# Patient Record
Sex: FEMALE | Race: WHITE | Employment: OTHER | URBAN - METROPOLITAN AREA
[De-identification: names, ages, dates, MRNs, and addresses within clinical notes are randomized per-mention and may not be internally consistent; named-entity substitution may affect disease eponyms.]

---

## 2016-01-01 ASSESSMENT — ENCOUNTER SYMPTOMS
SPEECH CHANGE: 0
SINUS PAIN: 0
HYPERTENSION: 0
MYALGIAS: 1
COUGH DISTURBING SLEEP: 1
CLAUDICATION: 1
NECK PAIN: 0
INSOMNIA: 1
DIZZINESS: 1
DYSURIA: 0
MEMORY LOSS: 0
HYPOTENSION: 0
NIGHT SWEATS: 0
POSTURAL DYSPNEA: 1
EXERCISE INTOLERANCE: 1
TROUBLE SWALLOWING: 0
ALTERED TEMPERATURE REGULATION: 0
CHILLS: 0
POLYPHAGIA: 0
SEIZURES: 0
HEMATURIA: 0
WEAKNESS: 1
DIFFICULTY URINATING: 0
HALLUCINATIONS: 0
LEG PAIN: 1
TASTE DISTURBANCE: 0
PARALYSIS: 0
TACHYCARDIA: 1
HEMOPTYSIS: 0
WEIGHT GAIN: 0
PANIC: 1
SMELL DISTURBANCE: 0
ARTHRALGIAS: 1
COUGH: 1
LOSS OF CONSCIOUSNESS: 0
LEG SWELLING: 1
SLEEP DISTURBANCES DUE TO BREATHING: 1
TINGLING: 1
DECREASED CONCENTRATION: 1
DISTURBANCES IN COORDINATION: 0
FATIGUE: 1
NECK MASS: 0
LIGHT-HEADEDNESS: 1
SNORES LOUDLY: 0
RESPIRATORY PAIN: 0
ORTHOPNEA: 1
HOARSE VOICE: 0
POLYDIPSIA: 0
STIFFNESS: 1
SINUS CONGESTION: 1
NERVOUS/ANXIOUS: 1
BACK PAIN: 0
FLANK PAIN: 0
HEADACHES: 0
DEPRESSION: 1
DYSPNEA ON EXERTION: 1
MUSCLE CRAMPS: 0
SORE THROAT: 0
WHEEZING: 1
WEIGHT LOSS: 0
DECREASED APPETITE: 0
JOINT SWELLING: 1
PALPITATIONS: 1
SPUTUM PRODUCTION: 0
FEVER: 0
INCREASED ENERGY: 1
SYNCOPE: 0
NUMBNESS: 1
TREMORS: 0
SHORTNESS OF BREATH: 1
MUSCLE WEAKNESS: 0

## 2017-01-01 ENCOUNTER — ANTICOAGULATION THERAPY VISIT (OUTPATIENT)
Dept: ANTICOAGULATION | Facility: CLINIC | Age: 67
End: 2017-01-01

## 2017-01-01 ENCOUNTER — ONCOLOGY VISIT (OUTPATIENT)
Dept: ONCOLOGY | Facility: CLINIC | Age: 67
End: 2017-01-01
Attending: NURSE PRACTITIONER
Payer: MEDICARE

## 2017-01-01 ENCOUNTER — TELEPHONE (OUTPATIENT)
Dept: PALLIATIVE CARE | Facility: CLINIC | Age: 67
End: 2017-01-01

## 2017-01-01 ENCOUNTER — TELEPHONE (OUTPATIENT)
Dept: FAMILY MEDICINE | Facility: CLINIC | Age: 67
End: 2017-01-01

## 2017-01-01 ENCOUNTER — OFFICE VISIT (OUTPATIENT)
Dept: PALLIATIVE CARE | Facility: CLINIC | Age: 67
End: 2017-01-01
Attending: SOCIAL WORKER
Payer: MEDICARE

## 2017-01-01 ENCOUNTER — INFUSION THERAPY VISIT (OUTPATIENT)
Dept: ONCOLOGY | Facility: CLINIC | Age: 67
End: 2017-01-01
Attending: OBSTETRICS & GYNECOLOGY
Payer: MEDICARE

## 2017-01-01 ENCOUNTER — OFFICE VISIT (OUTPATIENT)
Dept: PALLIATIVE CARE | Facility: CLINIC | Age: 67
End: 2017-01-01
Attending: INTERNAL MEDICINE
Payer: MEDICARE

## 2017-01-01 ENCOUNTER — CARE COORDINATION (OUTPATIENT)
Dept: ONCOLOGY | Facility: CLINIC | Age: 67
End: 2017-01-01

## 2017-01-01 ENCOUNTER — TELEPHONE (OUTPATIENT)
Dept: NURSING | Facility: CLINIC | Age: 67
End: 2017-01-01

## 2017-01-01 ENCOUNTER — TELEPHONE (OUTPATIENT)
Dept: OTHER | Facility: CLINIC | Age: 67
End: 2017-01-01

## 2017-01-01 ENCOUNTER — TRANSFERRED RECORDS (OUTPATIENT)
Dept: HEALTH INFORMATION MANAGEMENT | Facility: CLINIC | Age: 67
End: 2017-01-01

## 2017-01-01 ENCOUNTER — TELEPHONE (OUTPATIENT)
Dept: CARE COORDINATION | Facility: CLINIC | Age: 67
End: 2017-01-01

## 2017-01-01 ENCOUNTER — TELEPHONE (OUTPATIENT)
Dept: INTERNAL MEDICINE | Facility: CLINIC | Age: 67
End: 2017-01-01

## 2017-01-01 ENCOUNTER — TELEPHONE (OUTPATIENT)
Dept: ONCOLOGY | Facility: CLINIC | Age: 67
End: 2017-01-01

## 2017-01-01 ENCOUNTER — DOCUMENTATION ONLY (OUTPATIENT)
Dept: OTHER | Facility: CLINIC | Age: 67
End: 2017-01-01

## 2017-01-01 ENCOUNTER — OFFICE VISIT (OUTPATIENT)
Dept: URGENT CARE | Facility: URGENT CARE | Age: 67
End: 2017-01-01
Payer: MEDICARE

## 2017-01-01 ENCOUNTER — APPOINTMENT (OUTPATIENT)
Dept: LAB | Facility: CLINIC | Age: 67
End: 2017-01-01
Attending: OBSTETRICS & GYNECOLOGY
Payer: MEDICARE

## 2017-01-01 ENCOUNTER — APPOINTMENT (OUTPATIENT)
Dept: LAB | Facility: CLINIC | Age: 67
End: 2017-01-01
Attending: NURSE PRACTITIONER
Payer: MEDICARE

## 2017-01-01 ENCOUNTER — OFFICE VISIT (OUTPATIENT)
Dept: PALLIATIVE CARE | Facility: CLINIC | Age: 67
End: 2017-01-01
Payer: MEDICARE

## 2017-01-01 ENCOUNTER — TELEPHONE (OUTPATIENT)
Dept: URGENT CARE | Facility: URGENT CARE | Age: 67
End: 2017-01-01

## 2017-01-01 VITALS
DIASTOLIC BLOOD PRESSURE: 62 MMHG | BODY MASS INDEX: 30.6 KG/M2 | WEIGHT: 167.3 LBS | HEART RATE: 119 BPM | OXYGEN SATURATION: 93 % | TEMPERATURE: 100.3 F | RESPIRATION RATE: 18 BRPM | SYSTOLIC BLOOD PRESSURE: 115 MMHG

## 2017-01-01 VITALS
RESPIRATION RATE: 20 BRPM | DIASTOLIC BLOOD PRESSURE: 89 MMHG | TEMPERATURE: 98.3 F | BODY MASS INDEX: 31.89 KG/M2 | SYSTOLIC BLOOD PRESSURE: 143 MMHG | WEIGHT: 174.38 LBS | OXYGEN SATURATION: 94 % | HEART RATE: 108 BPM

## 2017-01-01 VITALS
WEIGHT: 174.7 LBS | HEART RATE: 116 BPM | TEMPERATURE: 98.3 F | RESPIRATION RATE: 20 BRPM | SYSTOLIC BLOOD PRESSURE: 124 MMHG | DIASTOLIC BLOOD PRESSURE: 80 MMHG | OXYGEN SATURATION: 95 % | BODY MASS INDEX: 31.94 KG/M2

## 2017-01-01 VITALS
HEART RATE: 107 BPM | WEIGHT: 165.25 LBS | OXYGEN SATURATION: 92 % | TEMPERATURE: 99.8 F | BODY MASS INDEX: 30.22 KG/M2 | DIASTOLIC BLOOD PRESSURE: 78 MMHG | SYSTOLIC BLOOD PRESSURE: 137 MMHG

## 2017-01-01 VITALS
BODY MASS INDEX: 32.24 KG/M2 | HEART RATE: 93 BPM | TEMPERATURE: 99.2 F | DIASTOLIC BLOOD PRESSURE: 69 MMHG | OXYGEN SATURATION: 96 % | SYSTOLIC BLOOD PRESSURE: 122 MMHG | WEIGHT: 176.3 LBS | RESPIRATION RATE: 20 BRPM

## 2017-01-01 VITALS
WEIGHT: 174 LBS | RESPIRATION RATE: 18 BRPM | DIASTOLIC BLOOD PRESSURE: 69 MMHG | OXYGEN SATURATION: 95 % | TEMPERATURE: 98.2 F | HEART RATE: 106 BPM | BODY MASS INDEX: 31.82 KG/M2 | SYSTOLIC BLOOD PRESSURE: 130 MMHG

## 2017-01-01 VITALS
RESPIRATION RATE: 16 BRPM | SYSTOLIC BLOOD PRESSURE: 130 MMHG | TEMPERATURE: 98.9 F | OXYGEN SATURATION: 91 % | HEART RATE: 98 BPM | BODY MASS INDEX: 31.15 KG/M2 | DIASTOLIC BLOOD PRESSURE: 73 MMHG | TEMPERATURE: 98.5 F | OXYGEN SATURATION: 98 % | SYSTOLIC BLOOD PRESSURE: 128 MMHG | HEART RATE: 98 BPM | WEIGHT: 169.2 LBS | RESPIRATION RATE: 14 BRPM | BODY MASS INDEX: 30.95 KG/M2 | DIASTOLIC BLOOD PRESSURE: 69 MMHG | WEIGHT: 170.3 LBS

## 2017-01-01 VITALS
WEIGHT: 172.4 LBS | HEIGHT: 62 IN | RESPIRATION RATE: 16 BRPM | BODY MASS INDEX: 31.73 KG/M2 | OXYGEN SATURATION: 93 % | SYSTOLIC BLOOD PRESSURE: 119 MMHG | DIASTOLIC BLOOD PRESSURE: 74 MMHG | HEART RATE: 119 BPM | TEMPERATURE: 98 F

## 2017-01-01 VITALS
RESPIRATION RATE: 16 BRPM | BODY MASS INDEX: 32.09 KG/M2 | SYSTOLIC BLOOD PRESSURE: 117 MMHG | DIASTOLIC BLOOD PRESSURE: 76 MMHG | TEMPERATURE: 99 F | WEIGHT: 175.5 LBS | OXYGEN SATURATION: 95 % | HEART RATE: 116 BPM

## 2017-01-01 VITALS
BODY MASS INDEX: 32.15 KG/M2 | HEART RATE: 101 BPM | TEMPERATURE: 98 F | DIASTOLIC BLOOD PRESSURE: 86 MMHG | HEIGHT: 62 IN | WEIGHT: 174.7 LBS | SYSTOLIC BLOOD PRESSURE: 123 MMHG | OXYGEN SATURATION: 93 % | RESPIRATION RATE: 18 BRPM

## 2017-01-01 VITALS
HEART RATE: 114 BPM | DIASTOLIC BLOOD PRESSURE: 53 MMHG | TEMPERATURE: 99.7 F | WEIGHT: 171 LBS | OXYGEN SATURATION: 97 % | SYSTOLIC BLOOD PRESSURE: 103 MMHG | RESPIRATION RATE: 20 BRPM | BODY MASS INDEX: 31.28 KG/M2

## 2017-01-01 VITALS
WEIGHT: 168.43 LBS | HEART RATE: 98 BPM | OXYGEN SATURATION: 93 % | RESPIRATION RATE: 20 BRPM | SYSTOLIC BLOOD PRESSURE: 144 MMHG | DIASTOLIC BLOOD PRESSURE: 81 MMHG | TEMPERATURE: 98.7 F | BODY MASS INDEX: 30.81 KG/M2

## 2017-01-01 VITALS
RESPIRATION RATE: 18 BRPM | DIASTOLIC BLOOD PRESSURE: 70 MMHG | SYSTOLIC BLOOD PRESSURE: 129 MMHG | OXYGEN SATURATION: 96 % | BODY MASS INDEX: 32.71 KG/M2 | WEIGHT: 178.9 LBS | TEMPERATURE: 98.3 F | HEART RATE: 102 BPM

## 2017-01-01 DIAGNOSIS — Z79.01 LONG-TERM (CURRENT) USE OF ANTICOAGULANTS: ICD-10-CM

## 2017-01-01 DIAGNOSIS — I82.409 DEEP VEIN THROMBOSIS (H): Primary | ICD-10-CM

## 2017-01-01 DIAGNOSIS — Z51.5 ENCOUNTER FOR PALLIATIVE CARE: Primary | ICD-10-CM

## 2017-01-01 DIAGNOSIS — F43.21 ADJUSTMENT DISORDER WITH DEPRESSED MOOD: ICD-10-CM

## 2017-01-01 DIAGNOSIS — I82.409 DEEP VEIN THROMBOSIS (DVT) (H): ICD-10-CM

## 2017-01-01 DIAGNOSIS — C78.02 MALIGNANT NEOPLASM METASTATIC TO BOTH LUNGS (H): ICD-10-CM

## 2017-01-01 DIAGNOSIS — Z79.01 LONG-TERM (CURRENT) USE OF ANTICOAGULANTS: Primary | ICD-10-CM

## 2017-01-01 DIAGNOSIS — Z79.899 ENCOUNTER FOR LONG-TERM CURRENT USE OF MEDICATION: ICD-10-CM

## 2017-01-01 DIAGNOSIS — C56.2 OVARIAN CANCER, LEFT (H): Primary | ICD-10-CM

## 2017-01-01 DIAGNOSIS — C56.9 OVARIAN CANCER, UNSPECIFIED LATERALITY (H): Primary | ICD-10-CM

## 2017-01-01 DIAGNOSIS — C56.9 OVARIAN CANCER, UNSPECIFIED LATERALITY (H): ICD-10-CM

## 2017-01-01 DIAGNOSIS — C56.1 OVARIAN CANCER, RIGHT (H): ICD-10-CM

## 2017-01-01 DIAGNOSIS — C78.01 MALIGNANT NEOPLASM METASTATIC TO BOTH LUNGS (H): ICD-10-CM

## 2017-01-01 DIAGNOSIS — F41.1 GENERALIZED ANXIETY DISORDER: ICD-10-CM

## 2017-01-01 DIAGNOSIS — R06.02 SHORTNESS OF BREATH: ICD-10-CM

## 2017-01-01 DIAGNOSIS — J20.9 ACUTE BRONCHITIS WITH COEXISTING CONDITION REQUIRING PROPHYLACTIC TREATMENT: ICD-10-CM

## 2017-01-01 DIAGNOSIS — Z71.89 ADVANCE CARE PLANNING: Primary | Chronic | ICD-10-CM

## 2017-01-01 DIAGNOSIS — C78.01 MALIGNANT NEOPLASM METASTATIC TO BOTH LUNGS (H): Primary | ICD-10-CM

## 2017-01-01 DIAGNOSIS — J18.9 PNEUMONIA: Primary | ICD-10-CM

## 2017-01-01 DIAGNOSIS — R05.9 COUGH: ICD-10-CM

## 2017-01-01 DIAGNOSIS — C78.02 MALIGNANT NEOPLASM METASTATIC TO BOTH LUNGS (H): Primary | ICD-10-CM

## 2017-01-01 DIAGNOSIS — R06.02 SOB (SHORTNESS OF BREATH): ICD-10-CM

## 2017-01-01 DIAGNOSIS — C78.00 MALIGNANT NEOPLASM METASTATIC TO LUNG, UNSPECIFIED LATERALITY (H): ICD-10-CM

## 2017-01-01 DIAGNOSIS — M79.674 PAIN OF TOE OF RIGHT FOOT: ICD-10-CM

## 2017-01-01 DIAGNOSIS — Z79.01 LONG TERM (CURRENT) USE OF ANTICOAGULANTS: ICD-10-CM

## 2017-01-01 DIAGNOSIS — F41.1 GAD (GENERALIZED ANXIETY DISORDER): ICD-10-CM

## 2017-01-01 DIAGNOSIS — R06.2 WHEEZING: Primary | ICD-10-CM

## 2017-01-01 DIAGNOSIS — F41.9 ANXIETY: ICD-10-CM

## 2017-01-01 LAB
ALBUMIN SERPL-MCNC: 2.9 G/DL (ref 3.4–5)
ALBUMIN UR-MCNC: ABNORMAL MG/DL
ALBUMIN UR-MCNC: NEGATIVE MG/DL
ALP SERPL-CCNC: 75 U/L (ref 40–150)
ALT SERPL W P-5'-P-CCNC: 12 U/L (ref 0–50)
ANION GAP SERPL CALCULATED.3IONS-SCNC: 9 MMOL/L (ref 3–14)
AST SERPL W P-5'-P-CCNC: 18 U/L (ref 0–45)
BASOPHILS # BLD AUTO: 0 10E9/L (ref 0–0.2)
BASOPHILS NFR BLD AUTO: 0.1 %
BASOPHILS NFR BLD AUTO: 0.2 %
BASOPHILS NFR BLD AUTO: 0.2 %
BASOPHILS NFR BLD AUTO: 0.4 %
BASOPHILS NFR BLD AUTO: 0.5 %
BASOPHILS NFR BLD AUTO: 0.7 %
BILIRUB SERPL-MCNC: 0.4 MG/DL (ref 0.2–1.3)
BUN SERPL-MCNC: 10 MG/DL (ref 7–30)
CALCIUM SERPL-MCNC: 8.8 MG/DL (ref 8.5–10.1)
CANCER AG125 SERPL-ACNC: 69 U/ML (ref 0–30)
CHLORIDE SERPL-SCNC: 105 MMOL/L (ref 94–109)
CO2 SERPL-SCNC: 25 MMOL/L (ref 20–32)
CREAT SERPL-MCNC: 1.34 MG/DL (ref 0.52–1.04)
DIFFERENTIAL METHOD BLD: ABNORMAL
EOSINOPHIL # BLD AUTO: 0 10E9/L (ref 0–0.7)
EOSINOPHIL # BLD AUTO: 0 10E9/L (ref 0–0.7)
EOSINOPHIL # BLD AUTO: 0.1 10E9/L (ref 0–0.7)
EOSINOPHIL # BLD AUTO: 0.2 10E9/L (ref 0–0.7)
EOSINOPHIL # BLD AUTO: 0.2 10E9/L (ref 0–0.7)
EOSINOPHIL NFR BLD AUTO: 0.3 %
EOSINOPHIL NFR BLD AUTO: 0.3 %
EOSINOPHIL NFR BLD AUTO: 1.3 %
EOSINOPHIL NFR BLD AUTO: 1.8 %
EOSINOPHIL NFR BLD AUTO: 2.3 %
EOSINOPHIL NFR BLD AUTO: 2.4 %
EOSINOPHIL NFR BLD AUTO: 2.6 %
EOSINOPHIL NFR BLD AUTO: 2.6 %
EOSINOPHIL NFR BLD AUTO: 3.2 %
ERYTHROCYTE [DISTWIDTH] IN BLOOD BY AUTOMATED COUNT: 23.5 % (ref 10–15)
ERYTHROCYTE [DISTWIDTH] IN BLOOD BY AUTOMATED COUNT: 23.7 % (ref 10–15)
ERYTHROCYTE [DISTWIDTH] IN BLOOD BY AUTOMATED COUNT: 24 % (ref 10–15)
ERYTHROCYTE [DISTWIDTH] IN BLOOD BY AUTOMATED COUNT: 24.1 % (ref 10–15)
ERYTHROCYTE [DISTWIDTH] IN BLOOD BY AUTOMATED COUNT: 26.1 % (ref 10–15)
ERYTHROCYTE [DISTWIDTH] IN BLOOD BY AUTOMATED COUNT: 26.4 % (ref 10–15)
ERYTHROCYTE [DISTWIDTH] IN BLOOD BY AUTOMATED COUNT: 26.7 % (ref 10–15)
ERYTHROCYTE [DISTWIDTH] IN BLOOD BY AUTOMATED COUNT: 27 % (ref 10–15)
ERYTHROCYTE [DISTWIDTH] IN BLOOD BY AUTOMATED COUNT: 27 % (ref 10–15)
FLUAV+FLUBV AG SPEC QL: NEGATIVE
FLUAV+FLUBV AG SPEC QL: NORMAL
GFR SERPL CREATININE-BSD FRML MDRD: 40 ML/MIN/1.7M2
GLUCOSE SERPL-MCNC: 135 MG/DL (ref 70–99)
HCT VFR BLD AUTO: 28.4 % (ref 35–47)
HCT VFR BLD AUTO: 28.9 % (ref 35–47)
HCT VFR BLD AUTO: 29.9 % (ref 35–47)
HCT VFR BLD AUTO: 30 % (ref 35–47)
HCT VFR BLD AUTO: 30.4 % (ref 35–47)
HCT VFR BLD AUTO: 30.5 % (ref 35–47)
HCT VFR BLD AUTO: 30.6 % (ref 35–47)
HCT VFR BLD AUTO: 31.1 % (ref 35–47)
HCT VFR BLD AUTO: 32.2 % (ref 35–47)
HGB BLD-MCNC: 8.5 G/DL (ref 11.7–15.7)
HGB BLD-MCNC: 8.6 G/DL (ref 11.7–15.7)
HGB BLD-MCNC: 9.1 G/DL (ref 11.7–15.7)
HGB BLD-MCNC: 9.1 G/DL (ref 11.7–15.7)
HGB BLD-MCNC: 9.2 G/DL (ref 11.7–15.7)
HGB BLD-MCNC: 9.3 G/DL (ref 11.7–15.7)
HGB BLD-MCNC: 9.7 G/DL (ref 11.7–15.7)
IMM GRANULOCYTES # BLD: 0 10E9/L (ref 0–0.4)
IMM GRANULOCYTES # BLD: 0.1 10E9/L (ref 0–0.4)
IMM GRANULOCYTES # BLD: 0.2 10E9/L (ref 0–0.4)
IMM GRANULOCYTES # BLD: 0.2 10E9/L (ref 0–0.4)
IMM GRANULOCYTES NFR BLD: 0.2 %
IMM GRANULOCYTES NFR BLD: 0.5 %
IMM GRANULOCYTES NFR BLD: 0.5 %
IMM GRANULOCYTES NFR BLD: 0.6 %
IMM GRANULOCYTES NFR BLD: 0.7 %
IMM GRANULOCYTES NFR BLD: 1 %
IMM GRANULOCYTES NFR BLD: 1.1 %
IMM GRANULOCYTES NFR BLD: 1.4 %
IMM GRANULOCYTES NFR BLD: 2.5 %
INR PPP: 1.69 (ref 0.86–1.14)
INR PPP: 1.69 (ref 0.86–1.14)
INR PPP: 1.9
INR PPP: 1.9 (ref 0.86–1.14)
INR PPP: 2.42 (ref 0.86–1.14)
INR PPP: 2.6
INR PPP: 2.83 (ref 0.86–1.14)
INR PPP: 3.1
INR PPP: 3.2 (ref 0.86–1.14)
INR PPP: 4.81 (ref 0.86–1.14)
INR PPP: 7.2 (ref 0.86–1.14)
LYMPHOCYTES # BLD AUTO: 0.3 10E9/L (ref 0.8–5.3)
LYMPHOCYTES # BLD AUTO: 0.5 10E9/L (ref 0.8–5.3)
LYMPHOCYTES # BLD AUTO: 0.7 10E9/L (ref 0.8–5.3)
LYMPHOCYTES # BLD AUTO: 0.8 10E9/L (ref 0.8–5.3)
LYMPHOCYTES # BLD AUTO: 1 10E9/L (ref 0.8–5.3)
LYMPHOCYTES # BLD AUTO: 1.1 10E9/L (ref 0.8–5.3)
LYMPHOCYTES # BLD AUTO: 1.1 10E9/L (ref 0.8–5.3)
LYMPHOCYTES # BLD AUTO: 1.3 10E9/L (ref 0.8–5.3)
LYMPHOCYTES # BLD AUTO: 1.3 10E9/L (ref 0.8–5.3)
LYMPHOCYTES NFR BLD AUTO: 11 %
LYMPHOCYTES NFR BLD AUTO: 15 %
LYMPHOCYTES NFR BLD AUTO: 23.9 %
LYMPHOCYTES NFR BLD AUTO: 25.6 %
LYMPHOCYTES NFR BLD AUTO: 26.7 %
LYMPHOCYTES NFR BLD AUTO: 28.7 %
LYMPHOCYTES NFR BLD AUTO: 4 %
LYMPHOCYTES NFR BLD AUTO: 4.5 %
LYMPHOCYTES NFR BLD AUTO: 9.5 %
MAGNESIUM SERPL-MCNC: 1.7 MG/DL (ref 1.6–2.3)
MAGNESIUM SERPL-MCNC: 1.8 MG/DL (ref 1.6–2.3)
MAGNESIUM SERPL-MCNC: 2 MG/DL (ref 1.6–2.3)
MAGNESIUM SERPL-MCNC: 2.1 MG/DL (ref 1.6–2.3)
MCH RBC QN AUTO: 24.2 PG (ref 26.5–33)
MCH RBC QN AUTO: 24.8 PG (ref 26.5–33)
MCH RBC QN AUTO: 25.2 PG (ref 26.5–33)
MCH RBC QN AUTO: 25.3 PG (ref 26.5–33)
MCH RBC QN AUTO: 25.4 PG (ref 26.5–33)
MCH RBC QN AUTO: 25.6 PG (ref 26.5–33)
MCH RBC QN AUTO: 25.6 PG (ref 26.5–33)
MCH RBC QN AUTO: 25.8 PG (ref 26.5–33)
MCH RBC QN AUTO: 25.8 PG (ref 26.5–33)
MCHC RBC AUTO-ENTMCNC: 29.8 G/DL (ref 31.5–36.5)
MCHC RBC AUTO-ENTMCNC: 29.9 G/DL (ref 31.5–36.5)
MCHC RBC AUTO-ENTMCNC: 29.9 G/DL (ref 31.5–36.5)
MCHC RBC AUTO-ENTMCNC: 30.1 G/DL (ref 31.5–36.5)
MCHC RBC AUTO-ENTMCNC: 30.1 G/DL (ref 31.5–36.5)
MCHC RBC AUTO-ENTMCNC: 30.2 G/DL (ref 31.5–36.5)
MCHC RBC AUTO-ENTMCNC: 30.3 G/DL (ref 31.5–36.5)
MCHC RBC AUTO-ENTMCNC: 30.3 G/DL (ref 31.5–36.5)
MCHC RBC AUTO-ENTMCNC: 30.4 G/DL (ref 31.5–36.5)
MCV RBC AUTO: 81 FL (ref 78–100)
MCV RBC AUTO: 82 FL (ref 78–100)
MCV RBC AUTO: 84 FL (ref 78–100)
MCV RBC AUTO: 85 FL (ref 78–100)
MCV RBC AUTO: 86 FL (ref 78–100)
MONOCYTES # BLD AUTO: 0.4 10E9/L (ref 0–1.3)
MONOCYTES # BLD AUTO: 0.5 10E9/L (ref 0–1.3)
MONOCYTES # BLD AUTO: 0.6 10E9/L (ref 0–1.3)
MONOCYTES # BLD AUTO: 1 10E9/L (ref 0–1.3)
MONOCYTES NFR BLD AUTO: 10.6 %
MONOCYTES NFR BLD AUTO: 10.7 %
MONOCYTES NFR BLD AUTO: 12.4 %
MONOCYTES NFR BLD AUTO: 3 %
MONOCYTES NFR BLD AUTO: 6.4 %
MONOCYTES NFR BLD AUTO: 7.7 %
MONOCYTES NFR BLD AUTO: 8.3 %
MONOCYTES NFR BLD AUTO: 9.2 %
MONOCYTES NFR BLD AUTO: 9.6 %
NEUTROPHILS # BLD AUTO: 11.7 10E9/L (ref 1.6–8.3)
NEUTROPHILS # BLD AUTO: 2.4 10E9/L (ref 1.6–8.3)
NEUTROPHILS # BLD AUTO: 2.5 10E9/L (ref 1.6–8.3)
NEUTROPHILS # BLD AUTO: 2.7 10E9/L (ref 1.6–8.3)
NEUTROPHILS # BLD AUTO: 2.7 10E9/L (ref 1.6–8.3)
NEUTROPHILS # BLD AUTO: 4.6 10E9/L (ref 1.6–8.3)
NEUTROPHILS # BLD AUTO: 6.1 10E9/L (ref 1.6–8.3)
NEUTROPHILS # BLD AUTO: 6.3 10E9/L (ref 1.6–8.3)
NEUTROPHILS # BLD AUTO: 6.5 10E9/L (ref 1.6–8.3)
NEUTROPHILS NFR BLD AUTO: 57.5 %
NEUTROPHILS NFR BLD AUTO: 58.7 %
NEUTROPHILS NFR BLD AUTO: 60.7 %
NEUTROPHILS NFR BLD AUTO: 63.3 %
NEUTROPHILS NFR BLD AUTO: 74.6 %
NEUTROPHILS NFR BLD AUTO: 75.4 %
NEUTROPHILS NFR BLD AUTO: 77.3 %
NEUTROPHILS NFR BLD AUTO: 85.7 %
NEUTROPHILS NFR BLD AUTO: 91.1 %
NRBC # BLD AUTO: 0 10*3/UL
NRBC BLD AUTO-RTO: 0 /100
PLATELET # BLD AUTO: 270 10E9/L (ref 150–450)
PLATELET # BLD AUTO: 275 10E9/L (ref 150–450)
PLATELET # BLD AUTO: 291 10E9/L (ref 150–450)
PLATELET # BLD AUTO: 294 10E9/L (ref 150–450)
PLATELET # BLD AUTO: 297 10E9/L (ref 150–450)
PLATELET # BLD AUTO: 310 10E9/L (ref 150–450)
PLATELET # BLD AUTO: 310 10E9/L (ref 150–450)
PLATELET # BLD AUTO: 323 10E9/L (ref 150–450)
PLATELET # BLD AUTO: 354 10E9/L (ref 150–450)
POTASSIUM SERPL-SCNC: 3.3 MMOL/L (ref 3.4–5.3)
POTASSIUM SERPL-SCNC: 3.7 MMOL/L (ref 3.4–5.3)
POTASSIUM SERPL-SCNC: 3.7 MMOL/L (ref 3.4–5.3)
POTASSIUM SERPL-SCNC: 3.8 MMOL/L (ref 3.4–5.3)
POTASSIUM SERPL-SCNC: 3.8 MMOL/L (ref 3.4–5.3)
POTASSIUM SERPL-SCNC: 3.9 MMOL/L (ref 3.4–5.3)
POTASSIUM SERPL-SCNC: 3.9 MMOL/L (ref 3.4–5.3)
POTASSIUM SERPL-SCNC: 4 MMOL/L (ref 3.4–5.3)
POTASSIUM SERPL-SCNC: 4.2 MMOL/L (ref 3.4–5.3)
PROT SERPL-MCNC: 6.4 G/DL (ref 6.8–8.8)
RBC # BLD AUTO: 3.37 10E12/L (ref 3.8–5.2)
RBC # BLD AUTO: 3.38 10E12/L (ref 3.8–5.2)
RBC # BLD AUTO: 3.55 10E12/L (ref 3.8–5.2)
RBC # BLD AUTO: 3.56 10E12/L (ref 3.8–5.2)
RBC # BLD AUTO: 3.59 10E12/L (ref 3.8–5.2)
RBC # BLD AUTO: 3.61 10E12/L (ref 3.8–5.2)
RBC # BLD AUTO: 3.67 10E12/L (ref 3.8–5.2)
RBC # BLD AUTO: 3.8 10E12/L (ref 3.8–5.2)
RBC # BLD AUTO: 3.84 10E12/L (ref 3.8–5.2)
SODIUM SERPL-SCNC: 140 MMOL/L (ref 133–144)
SPECIMEN SOURCE: NORMAL
URATE SERPL-MCNC: 7.2 MG/DL (ref 2.6–6)
WBC # BLD AUTO: 12.8 10E9/L (ref 4–11)
WBC # BLD AUTO: 4.2 10E9/L (ref 4–11)
WBC # BLD AUTO: 4.3 10E9/L (ref 4–11)
WBC # BLD AUTO: 4.4 10E9/L (ref 4–11)
WBC # BLD AUTO: 4.4 10E9/L (ref 4–11)
WBC # BLD AUTO: 6 10E9/L (ref 4–11)
WBC # BLD AUTO: 7.6 10E9/L (ref 4–11)
WBC # BLD AUTO: 8.2 10E9/L (ref 4–11)
WBC # BLD AUTO: 8.4 10E9/L (ref 4–11)

## 2017-01-01 PROCEDURE — 25000130 H RX MED GY IP 250 OP 259 PS 637: Mod: ZF | Performed by: NURSE PRACTITIONER

## 2017-01-01 PROCEDURE — 96375 TX/PRO/DX INJ NEW DRUG ADDON: CPT

## 2017-01-01 PROCEDURE — 84550 ASSAY OF BLOOD/URIC ACID: CPT | Performed by: OBSTETRICS & GYNECOLOGY

## 2017-01-01 PROCEDURE — 85025 COMPLETE CBC W/AUTO DIFF WBC: CPT | Performed by: OBSTETRICS & GYNECOLOGY

## 2017-01-01 PROCEDURE — 83735 ASSAY OF MAGNESIUM: CPT | Performed by: OBSTETRICS & GYNECOLOGY

## 2017-01-01 PROCEDURE — 99212 OFFICE O/P EST SF 10 MIN: CPT | Mod: ZF

## 2017-01-01 PROCEDURE — 99214 OFFICE O/P EST MOD 30 MIN: CPT | Mod: ZP | Performed by: INTERNAL MEDICINE

## 2017-01-01 PROCEDURE — 25000125 ZZHC RX 250: Mod: ZF | Performed by: NURSE PRACTITIONER

## 2017-01-01 PROCEDURE — 85610 PROTHROMBIN TIME: CPT | Performed by: NURSE PRACTITIONER

## 2017-01-01 PROCEDURE — 99212 OFFICE O/P EST SF 10 MIN: CPT

## 2017-01-01 PROCEDURE — S0028 INJECTION, FAMOTIDINE, 20 MG: HCPCS | Mod: ZF | Performed by: NURSE PRACTITIONER

## 2017-01-01 PROCEDURE — 96367 TX/PROPH/DG ADDL SEQ IV INF: CPT

## 2017-01-01 PROCEDURE — 83735 ASSAY OF MAGNESIUM: CPT | Performed by: NURSE PRACTITIONER

## 2017-01-01 PROCEDURE — 84132 ASSAY OF SERUM POTASSIUM: CPT | Performed by: NURSE PRACTITIONER

## 2017-01-01 PROCEDURE — 96413 CHEMO IV INFUSION 1 HR: CPT

## 2017-01-01 PROCEDURE — 25000128 H RX IP 250 OP 636: Mod: ZF | Performed by: NURSE PRACTITIONER

## 2017-01-01 PROCEDURE — 85025 COMPLETE CBC W/AUTO DIFF WBC: CPT | Performed by: NURSE PRACTITIONER

## 2017-01-01 PROCEDURE — A9270 NON-COVERED ITEM OR SERVICE: HCPCS | Mod: GY | Performed by: NURSE PRACTITIONER

## 2017-01-01 PROCEDURE — 96417 CHEMO IV INFUS EACH ADDL SEQ: CPT

## 2017-01-01 PROCEDURE — 36415 COLL VENOUS BLD VENIPUNCTURE: CPT | Performed by: INTERNAL MEDICINE

## 2017-01-01 PROCEDURE — 86304 IMMUNOASSAY TUMOR CA 125: CPT | Performed by: OBSTETRICS & GYNECOLOGY

## 2017-01-01 PROCEDURE — 25000132 ZZH RX MED GY IP 250 OP 250 PS 637: Mod: GY | Performed by: NURSE PRACTITIONER

## 2017-01-01 PROCEDURE — 90834 PSYTX W PT 45 MINUTES: CPT | Mod: ZP | Performed by: SOCIAL WORKER

## 2017-01-01 PROCEDURE — 81003 URINALYSIS AUTO W/O SCOPE: CPT | Performed by: NURSE PRACTITIONER

## 2017-01-01 PROCEDURE — 87804 INFLUENZA ASSAY W/OPTIC: CPT | Performed by: FAMILY MEDICINE

## 2017-01-01 PROCEDURE — 36416 COLLJ CAPILLARY BLOOD SPEC: CPT | Performed by: INTERNAL MEDICINE

## 2017-01-01 PROCEDURE — 25000132 ZZH RX MED GY IP 250 OP 250 PS 637: Mod: ZF | Performed by: NURSE PRACTITIONER

## 2017-01-01 PROCEDURE — 99214 OFFICE O/P EST MOD 30 MIN: CPT | Performed by: FAMILY MEDICINE

## 2017-01-01 PROCEDURE — 85610 PROTHROMBIN TIME: CPT | Performed by: INTERNAL MEDICINE

## 2017-01-01 PROCEDURE — 99214 OFFICE O/P EST MOD 30 MIN: CPT | Mod: ZP | Performed by: NURSE PRACTITIONER

## 2017-01-01 PROCEDURE — 99214 OFFICE O/P EST MOD 30 MIN: CPT | Mod: ZP | Performed by: OBSTETRICS & GYNECOLOGY

## 2017-01-01 PROCEDURE — 25000128 H RX IP 250 OP 636: Mod: ZF | Performed by: OBSTETRICS & GYNECOLOGY

## 2017-01-01 PROCEDURE — 81003 URINALYSIS AUTO W/O SCOPE: CPT | Performed by: OBSTETRICS & GYNECOLOGY

## 2017-01-01 PROCEDURE — 36593 DECLOT VASCULAR DEVICE: CPT

## 2017-01-01 PROCEDURE — 36415 COLL VENOUS BLD VENIPUNCTURE: CPT

## 2017-01-01 PROCEDURE — 99207 ZZC NO BILLABLE SERVICE THIS VISIT: CPT | Mod: ZP

## 2017-01-01 PROCEDURE — 80053 COMPREHEN METABOLIC PANEL: CPT | Performed by: OBSTETRICS & GYNECOLOGY

## 2017-01-01 RX ORDER — MEPERIDINE HYDROCHLORIDE 25 MG/ML
25 INJECTION INTRAMUSCULAR; INTRAVENOUS; SUBCUTANEOUS EVERY 30 MIN PRN
Status: CANCELLED | OUTPATIENT
Start: 2017-01-01

## 2017-01-01 RX ORDER — EPINEPHRINE 0.3 MG/.3ML
0.3 INJECTION SUBCUTANEOUS EVERY 5 MIN PRN
Status: CANCELLED | OUTPATIENT
Start: 2017-01-01

## 2017-01-01 RX ORDER — DIPHENHYDRAMINE HCL 25 MG
25 CAPSULE ORAL ONCE
Status: COMPLETED | OUTPATIENT
Start: 2017-01-01 | End: 2017-01-01

## 2017-01-01 RX ORDER — SODIUM CHLORIDE 9 MG/ML
1000 INJECTION, SOLUTION INTRAVENOUS CONTINUOUS PRN
Status: CANCELLED
Start: 2017-01-01

## 2017-01-01 RX ORDER — DEXAMETHASONE 2 MG/1
2 TABLET ORAL EVERY MORNING
Qty: 30 TABLET | Refills: 0 | Status: SHIPPED | OUTPATIENT
Start: 2017-01-01

## 2017-01-01 RX ORDER — HEPARIN SODIUM (PORCINE) LOCK FLUSH IV SOLN 100 UNIT/ML 100 UNIT/ML
500 SOLUTION INTRAVENOUS ONCE
Status: COMPLETED | OUTPATIENT
Start: 2017-01-01 | End: 2017-01-01

## 2017-01-01 RX ORDER — OXYCODONE HCL 10 MG/1
TABLET, FILM COATED, EXTENDED RELEASE ORAL
Qty: 60 TABLET | Refills: 0 | Status: SHIPPED | OUTPATIENT
Start: 2017-01-01 | End: 2017-01-01

## 2017-01-01 RX ORDER — HEPARIN SODIUM (PORCINE) LOCK FLUSH IV SOLN 100 UNIT/ML 100 UNIT/ML
5 SOLUTION INTRAVENOUS ONCE
Status: COMPLETED | OUTPATIENT
Start: 2017-01-01 | End: 2017-01-01

## 2017-01-01 RX ORDER — LORAZEPAM 2 MG/ML
1 INJECTION INTRAMUSCULAR EVERY 6 HOURS PRN
Status: CANCELLED
Start: 2017-01-01

## 2017-01-01 RX ORDER — ALBUTEROL SULFATE 0.83 MG/ML
2.5 SOLUTION RESPIRATORY (INHALATION)
Status: CANCELLED | OUTPATIENT
Start: 2017-01-01

## 2017-01-01 RX ORDER — HEPARIN SODIUM (PORCINE) LOCK FLUSH IV SOLN 100 UNIT/ML 100 UNIT/ML
500 SOLUTION INTRAVENOUS EVERY 8 HOURS
Status: DISCONTINUED | OUTPATIENT
Start: 2017-01-01 | End: 2017-01-01 | Stop reason: HOSPADM

## 2017-01-01 RX ORDER — OXYCODONE HCL 10 MG/1
TABLET, FILM COATED, EXTENDED RELEASE ORAL
Qty: 90 TABLET | Refills: 0 | Status: SHIPPED | OUTPATIENT
Start: 2017-01-01 | End: 2017-01-01

## 2017-01-01 RX ORDER — CEFDINIR 300 MG/1
300 CAPSULE ORAL DAILY
Qty: 10 CAPSULE | Refills: 0 | Status: SHIPPED | OUTPATIENT
Start: 2017-01-01 | End: 2017-01-01

## 2017-01-01 RX ORDER — PREDNISONE 10 MG/1
30 TABLET ORAL DAILY
Qty: 15 TABLET | Refills: 0 | Status: SHIPPED | OUTPATIENT
Start: 2017-01-01 | End: 2017-01-01

## 2017-01-01 RX ORDER — POTASSIUM CHLORIDE 1500 MG/1
60 TABLET, EXTENDED RELEASE ORAL DAILY
Status: DISCONTINUED | OUTPATIENT
Start: 2017-01-01 | End: 2017-01-01 | Stop reason: HOSPADM

## 2017-01-01 RX ORDER — ALBUTEROL SULFATE 90 UG/1
2 AEROSOL, METERED RESPIRATORY (INHALATION) EVERY 4 HOURS PRN
Qty: 1 INHALER | Refills: 0 | Status: SHIPPED | OUTPATIENT
Start: 2017-01-01 | End: 2017-01-01

## 2017-01-01 RX ORDER — HEPARIN SODIUM (PORCINE) LOCK FLUSH IV SOLN 100 UNIT/ML 100 UNIT/ML
500 SOLUTION INTRAVENOUS EVERY 8 HOURS
Status: CANCELLED
Start: 2017-01-01

## 2017-01-01 RX ORDER — LORAZEPAM 1 MG/1
1 TABLET ORAL EVERY 6 HOURS PRN
Qty: 30 TABLET | Refills: 1 | Status: SHIPPED | OUTPATIENT
Start: 2017-01-01 | End: 2017-01-01

## 2017-01-01 RX ORDER — METHYLPREDNISOLONE SODIUM SUCCINATE 125 MG/2ML
125 INJECTION, POWDER, LYOPHILIZED, FOR SOLUTION INTRAMUSCULAR; INTRAVENOUS
Status: CANCELLED
Start: 2017-01-01

## 2017-01-01 RX ORDER — DIPHENHYDRAMINE HYDROCHLORIDE 50 MG/ML
50 INJECTION INTRAMUSCULAR; INTRAVENOUS
Status: CANCELLED
Start: 2017-01-01

## 2017-01-01 RX ORDER — ALBUTEROL SULFATE 90 UG/1
1-2 AEROSOL, METERED RESPIRATORY (INHALATION)
Status: CANCELLED
Start: 2017-01-01

## 2017-01-01 RX ORDER — DIPHENHYDRAMINE HCL 25 MG
25 CAPSULE ORAL ONCE
Status: CANCELLED
Start: 2017-01-01

## 2017-01-01 RX ORDER — PREDNISONE 20 MG/1
TABLET ORAL
Qty: 18 TABLET | Refills: 0 | Status: SHIPPED | OUTPATIENT
Start: 2017-01-01

## 2017-01-01 RX ORDER — LORAZEPAM 1 MG/1
1 TABLET ORAL EVERY 6 HOURS PRN
Qty: 90 TABLET | Refills: 1 | Status: SHIPPED | OUTPATIENT
Start: 2017-01-01 | End: 2017-01-01

## 2017-01-01 RX ORDER — HEPARIN SODIUM (PORCINE) LOCK FLUSH IV SOLN 100 UNIT/ML 100 UNIT/ML
5 SOLUTION INTRAVENOUS DAILY PRN
Status: DISCONTINUED | OUTPATIENT
Start: 2017-01-01 | End: 2017-01-01 | Stop reason: HOSPADM

## 2017-01-01 RX ORDER — HEPARIN SODIUM (PORCINE) LOCK FLUSH IV SOLN 100 UNIT/ML 100 UNIT/ML
5 SOLUTION INTRAVENOUS EVERY 8 HOURS
Status: DISCONTINUED | OUTPATIENT
Start: 2017-01-01 | End: 2017-01-01 | Stop reason: HOSPADM

## 2017-01-01 RX ORDER — HYDROMORPHONE HYDROCHLORIDE 2 MG/1
2-4 TABLET ORAL
Qty: 200 TABLET | Refills: 0 | Status: SHIPPED | OUTPATIENT
Start: 2017-01-01

## 2017-01-01 RX ORDER — ALBUTEROL SULFATE 90 UG/1
2 AEROSOL, METERED RESPIRATORY (INHALATION)
COMMUNITY
Start: 2016-01-01 | End: 2017-01-01

## 2017-01-01 RX ORDER — IPRATROPIUM BROMIDE AND ALBUTEROL SULFATE 2.5; .5 MG/3ML; MG/3ML
1 SOLUTION RESPIRATORY (INHALATION) DAILY
Qty: 1 VIAL | Refills: 0 | COMMUNITY
Start: 2017-01-01 | End: 2017-01-01

## 2017-01-01 RX ORDER — DEXAMETHASONE 2 MG/1
2 TABLET ORAL EVERY MORNING
Qty: 30 TABLET | Refills: 0 | Status: SHIPPED | OUTPATIENT
Start: 2017-01-01 | End: 2017-01-01

## 2017-01-01 RX ORDER — ALBUTEROL SULFATE 0.83 MG/ML
1 SOLUTION RESPIRATORY (INHALATION) EVERY 6 HOURS PRN
Qty: 1 BOX | Refills: 3 | Status: SHIPPED | OUTPATIENT
Start: 2017-01-01

## 2017-01-01 RX ORDER — LORAZEPAM 1 MG/1
1 TABLET ORAL EVERY 6 HOURS PRN
Qty: 90 TABLET | Refills: 1 | Status: SHIPPED | OUTPATIENT
Start: 2017-01-01

## 2017-01-01 RX ORDER — OXYCODONE HCL 10 MG/1
TABLET, FILM COATED, EXTENDED RELEASE ORAL
Qty: 90 TABLET | Refills: 0 | Status: SHIPPED | OUTPATIENT
Start: 2017-01-01

## 2017-01-01 RX ORDER — WARFARIN SODIUM 2.5 MG/1
TABLET ORAL
Qty: 60 TABLET | Refills: 5 | Status: SHIPPED | OUTPATIENT
Start: 2017-01-01

## 2017-01-01 RX ORDER — PEAK FLOW METER
EACH MISCELLANEOUS
Refills: 0 | COMMUNITY
Start: 2017-01-01

## 2017-01-01 RX ADMIN — SODIUM CHLORIDE 250 ML: 9 INJECTION, SOLUTION INTRAVENOUS at 11:22

## 2017-01-01 RX ADMIN — DIPHENHYDRAMINE HYDROCHLORIDE 25 MG: 50 INJECTION, SOLUTION INTRAMUSCULAR; INTRAVENOUS at 12:48

## 2017-01-01 RX ADMIN — SODIUM CHLORIDE, PRESERVATIVE FREE 5 ML: 5 INJECTION INTRAVENOUS at 10:06

## 2017-01-01 RX ADMIN — DIPHENHYDRAMINE HYDROCHLORIDE 25 MG: 25 CAPSULE ORAL at 10:44

## 2017-01-01 RX ADMIN — PACLITAXEL 156 MG: 6 INJECTION, SOLUTION INTRAVENOUS at 13:38

## 2017-01-01 RX ADMIN — ALTEPLASE 2 MG: 2.2 INJECTION, POWDER, LYOPHILIZED, FOR SOLUTION INTRAVENOUS at 11:43

## 2017-01-01 RX ADMIN — SODIUM CHLORIDE, PRESERVATIVE FREE 500 UNITS: 5 INJECTION INTRAVENOUS at 12:26

## 2017-01-01 RX ADMIN — ALTEPLASE 2 MG: 2.2 INJECTION, POWDER, LYOPHILIZED, FOR SOLUTION INTRAVENOUS at 10:47

## 2017-01-01 RX ADMIN — FAMOTIDINE 40 MG: 10 INJECTION, SOLUTION INTRAVENOUS at 11:34

## 2017-01-01 RX ADMIN — DEXAMETHASONE SODIUM PHOSPHATE: 10 INJECTION, SOLUTION INTRAMUSCULAR; INTRAVENOUS at 11:27

## 2017-01-01 RX ADMIN — SODIUM CHLORIDE, PRESERVATIVE FREE 500 UNITS: 5 INJECTION INTRAVENOUS at 12:38

## 2017-01-01 RX ADMIN — DEXAMETHASONE SODIUM PHOSPHATE: 10 INJECTION, SOLUTION INTRAMUSCULAR; INTRAVENOUS at 10:40

## 2017-01-01 RX ADMIN — DEXAMETHASONE SODIUM PHOSPHATE: 10 INJECTION, SOLUTION INTRAMUSCULAR; INTRAVENOUS at 09:43

## 2017-01-01 RX ADMIN — POTASSIUM CHLORIDE 60 MEQ: 1500 TABLET, EXTENDED RELEASE ORAL at 14:40

## 2017-01-01 RX ADMIN — PACLITAXEL 156 MG: 6 INJECTION, SOLUTION INTRAVENOUS at 11:32

## 2017-01-01 RX ADMIN — SODIUM CHLORIDE, PRESERVATIVE FREE 5 ML: 5 INJECTION INTRAVENOUS at 10:13

## 2017-01-01 RX ADMIN — SODIUM CHLORIDE, PRESERVATIVE FREE 5 ML: 5 INJECTION INTRAVENOUS at 09:55

## 2017-01-01 RX ADMIN — DEXAMETHASONE SODIUM PHOSPHATE: 10 INJECTION, SOLUTION INTRAMUSCULAR; INTRAVENOUS at 12:42

## 2017-01-01 RX ADMIN — SODIUM CHLORIDE, PRESERVATIVE FREE 500 UNITS: 5 INJECTION INTRAVENOUS at 11:53

## 2017-01-01 RX ADMIN — SODIUM CHLORIDE 250 ML: 9 INJECTION, SOLUTION INTRAVENOUS at 12:42

## 2017-01-01 RX ADMIN — PACLITAXEL 156 MG: 6 INJECTION, SOLUTION INTRAVENOUS at 11:50

## 2017-01-01 RX ADMIN — SODIUM CHLORIDE, PRESERVATIVE FREE 500 UNITS: 5 INJECTION INTRAVENOUS at 12:07

## 2017-01-01 RX ADMIN — SODIUM CHLORIDE, PRESERVATIVE FREE 500 UNITS: 5 INJECTION INTRAVENOUS at 14:46

## 2017-01-01 RX ADMIN — FAMOTIDINE 40 MG: 10 INJECTION INTRAVENOUS at 09:59

## 2017-01-01 RX ADMIN — BEVACIZUMAB 800 MG: 400 INJECTION, SOLUTION INTRAVENOUS at 13:03

## 2017-01-01 RX ADMIN — SODIUM CHLORIDE 250 ML: 9 INJECTION, SOLUTION INTRAVENOUS at 12:18

## 2017-01-01 RX ADMIN — FAMOTIDINE 40 MG: 10 INJECTION, SOLUTION INTRAVENOUS at 10:36

## 2017-01-01 RX ADMIN — PACLITAXEL 156 MG: 6 INJECTION, SOLUTION INTRAVENOUS at 10:48

## 2017-01-01 RX ADMIN — PACLITAXEL 156 MG: 6 INJECTION, SOLUTION INTRAVENOUS at 11:31

## 2017-01-01 RX ADMIN — SODIUM CHLORIDE 250 ML: 9 INJECTION, SOLUTION INTRAVENOUS at 11:18

## 2017-01-01 RX ADMIN — SODIUM CHLORIDE, PRESERVATIVE FREE 500 UNITS: 5 INJECTION INTRAVENOUS at 12:53

## 2017-01-01 RX ADMIN — DIPHENHYDRAMINE HYDROCHLORIDE 25 MG: 25 CAPSULE ORAL at 12:42

## 2017-01-01 RX ADMIN — SODIUM CHLORIDE, PRESERVATIVE FREE 500 UNITS: 5 INJECTION INTRAVENOUS at 09:46

## 2017-01-01 RX ADMIN — FAMOTIDINE 40 MG: 10 INJECTION INTRAVENOUS at 11:16

## 2017-01-01 RX ADMIN — SODIUM CHLORIDE 250 ML: 9 INJECTION, SOLUTION INTRAVENOUS at 10:47

## 2017-01-01 RX ADMIN — FAMOTIDINE 40 MG: 10 INJECTION, SOLUTION INTRAVENOUS at 11:06

## 2017-01-01 RX ADMIN — FAMOTIDINE 40 MG: 10 INJECTION, SOLUTION INTRAVENOUS at 11:04

## 2017-01-01 RX ADMIN — SODIUM CHLORIDE, PRESERVATIVE FREE 5 ML: 5 INJECTION INTRAVENOUS at 10:07

## 2017-01-01 RX ADMIN — FAMOTIDINE 40 MG: 10 INJECTION, SOLUTION INTRAVENOUS at 11:39

## 2017-01-01 RX ADMIN — SODIUM CHLORIDE 250 ML: 9 INJECTION, SOLUTION INTRAVENOUS at 10:41

## 2017-01-01 RX ADMIN — BEVACIZUMAB 870 MG: 400 INJECTION, SOLUTION INTRAVENOUS at 13:12

## 2017-01-01 RX ADMIN — PACLITAXEL 156 MG: 6 INJECTION, SOLUTION INTRAVENOUS at 12:37

## 2017-01-01 RX ADMIN — FAMOTIDINE 40 MG: 10 INJECTION INTRAVENOUS at 12:18

## 2017-01-01 RX ADMIN — SODIUM CHLORIDE 250 ML: 9 INJECTION, SOLUTION INTRAVENOUS at 10:36

## 2017-01-01 RX ADMIN — PACLITAXEL 156 MG: 6 INJECTION, SOLUTION INTRAVENOUS at 11:22

## 2017-01-01 RX ADMIN — SODIUM CHLORIDE, PRESERVATIVE FREE 500 UNITS: 5 INJECTION INTRAVENOUS at 14:45

## 2017-01-01 RX ADMIN — SODIUM CHLORIDE, PRESERVATIVE FREE 5 ML: 5 INJECTION INTRAVENOUS at 09:46

## 2017-01-01 RX ADMIN — FAMOTIDINE 40 MG: 10 INJECTION, SOLUTION INTRAVENOUS at 12:57

## 2017-01-01 RX ADMIN — DEXAMETHASONE SODIUM PHOSPHATE: 10 INJECTION, SOLUTION INTRAMUSCULAR; INTRAVENOUS at 10:52

## 2017-01-01 RX ADMIN — SODIUM CHLORIDE 250 ML: 9 INJECTION, SOLUTION INTRAVENOUS at 09:43

## 2017-01-01 RX ADMIN — BEVACIZUMAB 800 MG: 400 INJECTION, SOLUTION INTRAVENOUS at 10:11

## 2017-01-01 RX ADMIN — BEVACIZUMAB 870 MG: 400 INJECTION, SOLUTION INTRAVENOUS at 12:04

## 2017-01-01 RX ADMIN — PACLITAXEL 156 MG: 6 INJECTION, SOLUTION INTRAVENOUS at 11:05

## 2017-01-01 RX ADMIN — DEXAMETHASONE SODIUM PHOSPHATE: 10 INJECTION, SOLUTION INTRAMUSCULAR; INTRAVENOUS at 11:02

## 2017-01-01 RX ADMIN — SODIUM CHLORIDE, PRESERVATIVE FREE 5 ML: 5 INJECTION INTRAVENOUS at 13:41

## 2017-01-01 RX ADMIN — SODIUM CHLORIDE, PRESERVATIVE FREE 5 ML: 5 INJECTION INTRAVENOUS at 07:40

## 2017-01-01 RX ADMIN — DEXAMETHASONE SODIUM PHOSPHATE: 10 INJECTION, SOLUTION INTRAMUSCULAR; INTRAVENOUS at 10:47

## 2017-01-01 RX ADMIN — DIPHENHYDRAMINE HYDROCHLORIDE 25 MG: 25 CAPSULE ORAL at 10:40

## 2017-01-01 RX ADMIN — DIPHENHYDRAMINE HYDROCHLORIDE 25 MG: 25 CAPSULE ORAL at 10:59

## 2017-01-01 RX ADMIN — DEXAMETHASONE SODIUM PHOSPHATE: 10 INJECTION, SOLUTION INTRAMUSCULAR; INTRAVENOUS at 12:32

## 2017-01-01 RX ADMIN — DEXAMETHASONE SODIUM PHOSPHATE: 10 INJECTION, SOLUTION INTRAMUSCULAR; INTRAVENOUS at 11:19

## 2017-01-01 RX ADMIN — SODIUM CHLORIDE, PRESERVATIVE FREE 500 UNITS: 5 INJECTION INTRAVENOUS at 12:35

## 2017-01-01 RX ADMIN — PACLITAXEL 156 MG: 6 INJECTION, SOLUTION INTRAVENOUS at 13:44

## 2017-01-01 RX ADMIN — DIPHENHYDRAMINE HYDROCHLORIDE 25 MG: 25 CAPSULE ORAL at 11:18

## 2017-01-01 RX ADMIN — DIPHENHYDRAMINE HYDROCHLORIDE 25 MG: 25 CAPSULE ORAL at 11:22

## 2017-01-01 RX ADMIN — DIPHENHYDRAMINE HYDROCHLORIDE 25 MG: 25 CAPSULE ORAL at 10:36

## 2017-01-01 RX ADMIN — DIPHENHYDRAMINE HYDROCHLORIDE 25 MG: 25 CAPSULE ORAL at 09:43

## 2017-01-01 RX ADMIN — SODIUM CHLORIDE 250 ML: 9 INJECTION, SOLUTION INTRAVENOUS at 11:02

## 2017-01-01 ASSESSMENT — ENCOUNTER SYMPTOMS
HYPOTENSION: 0
FATIGUE: 1
COUGH: 1
POSTURAL DYSPNEA: 1
INSOMNIA: 1
POSTURAL DYSPNEA: 1
SHORTNESS OF BREATH: 1
HOARSE VOICE: 0
SINUS CONGESTION: 1
DECREASED APPETITE: 1
PALPITATIONS: 0
INCREASED ENERGY: 1
PANIC: 1
CONSTIPATION: 0
DYSPNEA ON EXERTION: 1
PALPITATIONS: 0
JAUNDICE: 0
TACHYCARDIA: 1
SNORES LOUDLY: 0
DYSPNEA ON EXERTION: 1
DECREASED APPETITE: 1
SYNCOPE: 0
WEIGHT GAIN: 0
NAUSEA: 1
BLOATING: 0
CHILLS: 0
SPUTUM PRODUCTION: 1
POLYPHAGIA: 0
DIARRHEA: 0
TROUBLE SWALLOWING: 0
ABDOMINAL PAIN: 0
LIGHT-HEADEDNESS: 1
FEVER: 0
LEG SWELLING: 1
SORE THROAT: 0
SNORES LOUDLY: 0
HYPERTENSION: 0
ALTERED TEMPERATURE REGULATION: 0
HALLUCINATIONS: 0
EXERCISE INTOLERANCE: 1
HOARSE VOICE: 0
POLYDIPSIA: 0
JAUNDICE: 0
COUGH DISTURBING SLEEP: 1
HALLUCINATIONS: 0
NIGHT SWEATS: 0
NERVOUS/ANXIOUS: 1
CONSTIPATION: 0
LEG PAIN: 0
TACHYCARDIA: 1
SHORTNESS OF BREATH: 1
LIGHT-HEADEDNESS: 1
BLOATING: 0
BLOOD IN STOOL: 0
INSOMNIA: 1
WHEEZING: 1
CHILLS: 0
BLOOD IN STOOL: 0
RECTAL BLEEDING: 0
WEIGHT LOSS: 0
SINUS PAIN: 0
SORE THROAT: 0
PANIC: 1
RECTAL BLEEDING: 0
POLYPHAGIA: 0
DECREASED CONCENTRATION: 1
INCREASED ENERGY: 1
WHEEZING: 1
ABDOMINAL PAIN: 0
NAUSEA: 1
HEMOPTYSIS: 0
DECREASED CONCENTRATION: 1
POLYDIPSIA: 0
VOMITING: 1
NIGHT SWEATS: 0
LEG PAIN: 0
ALTERED TEMPERATURE REGULATION: 0
WEIGHT LOSS: 0
NECK MASS: 0
TASTE DISTURBANCE: 0
RECTAL PAIN: 0
DEPRESSION: 1
COUGH: 1
NERVOUS/ANXIOUS: 1
FEVER: 0
BOWEL INCONTINENCE: 0
TASTE DISTURBANCE: 0
DIARRHEA: 0
RESPIRATORY PAIN: 0
SPUTUM PRODUCTION: 1
HEARTBURN: 1
SINUS CONGESTION: 1
VOMITING: 1
SYNCOPE: 0
SLEEP DISTURBANCES DUE TO BREATHING: 1
EXERCISE INTOLERANCE: 1
DEPRESSION: 1
BOWEL INCONTINENCE: 0
HYPERTENSION: 0
RECTAL PAIN: 0
HYPOTENSION: 0
ORTHOPNEA: 1
HEARTBURN: 1
SLEEP DISTURBANCES DUE TO BREATHING: 1
CLAUDICATION: 0
SMELL DISTURBANCE: 0
NECK MASS: 0
LEG SWELLING: 1
SINUS PAIN: 0
COUGH DISTURBING SLEEP: 1
WEIGHT GAIN: 0
SMELL DISTURBANCE: 0
FATIGUE: 1
RESPIRATORY PAIN: 0
ORTHOPNEA: 1
HEMOPTYSIS: 0
TROUBLE SWALLOWING: 0
CLAUDICATION: 0

## 2017-01-01 ASSESSMENT — PAIN SCALES - GENERAL
PAINLEVEL: NO PAIN (0)
PAINLEVEL: EXTREME PAIN (9)
PAINLEVEL: NO PAIN (0)

## 2017-01-04 NOTE — PROGRESS NOTES
ANTICOAGULATION FOLLOW-UP CLINIC VISIT    Patient Name:  Etelvina Jacobs  Date:  1/4/2017  Contact Type:  Telephone    SUBJECTIVE:     Patient Findings     Positives Unexplained INR or factor level change           OBJECTIVE    INR   Date Value Ref Range Status   01/04/2017 4.81* 0.86 - 1.14 Final       ASSESSMENT / PLAN  INR assessment SUPRA    Recheck INR In: 2 DAYS    INR Location Clinic      Anticoagulation Summary as of 1/4/2017     INR goal 2.0-3.0   Selected INR 4.81! (1/4/2017)   Maintenance plan 5 mg (5 mg x 1) on Tue, Fri; 2.5 mg (5 mg x 0.5) all other days   Full instructions 1/4: Hold; Otherwise 5 mg on Tue, Fri; 2.5 mg all other days   Weekly total 22.5 mg   Plan last modified Leana Castaneda RN (12/28/2016)   Next INR check 1/6/2017   Priority INR   Target end date     Indications   Long-term (current) use of anticoagulants [Z79.01] [Z79.01]  Deep vein thrombosis (DVT) (H) [I82.409] [I82.409]         Anticoagulation Episode Summary     INR check location     Preferred lab     Send INR reminders to [facility] CLINIC    Comments       Anticoagulation Care Providers     Provider Role Specialty Phone number    Alberta Deal MD [facility] Internal Medicine [phone]            See the Encounter Report to view Anticoagulation Flowsheet and Dosing Calendar (Go to Encounters tab in chart review, and find the Anticoagulation Therapy Visit)    Left message for patient with results and dosing recommendations. Asked patient to call back to report any missed doses, falls, signs and symptoms of bleeding or clotting, any changes in health, medication, or diet. Asked patient to call back with any questions or concerns.     Shari Cancino RN

## 2017-01-04 NOTE — MR AVS SNAPSHOT
After Visit Summary   1/4/2017    Etelvina Jacobs    MRN: [id number]           Patient Information     Date Of Birth          1950        Visit Information        Provider Department      1/4/2017 10:30 AM  30 ATC;  ONCOLOGY INFUSION [facility]        Today's Diagnoses     Ovarian cancer, left (H)    -  1     Encounter for long-term current use of medication         Deep vein thrombosis (DVT) (H)           Care Instructions    Contact Numbers  [facility] Main Line/After Hours: [phone]  [facility] Triage line: [phone]      Please call the triage or after hours line if you experience a temperature greater than or equal to 100.5, shaking chills, have uncontrolled nausea, vomiting and/or diarrhea, dizziness, shortness of breath, chest pain, bleeding, unexplained bruising, or if you have any other new/concerning symptoms, questions or concerns.     If it is after hours, weekends, or holidays, please call the main hospital  at  [phone] and ask to speak to the Oncology doctor on call.     If you are having any concerning symptoms or wish to speak to a provider before your next infusion visit, please call to notify us so we can adequately serve you.     If you need a refill on a narcotic prescription or other medication, please call before your infusion appointment.                     January 2017 Sunday Monday Tuesday Wednesday Thursday Friday Saturday   1     2     3     4     [facility]   10:00 AM   (15 min.)   [facility] LAB DRAW   [facility] Lab Draw     [facility] ONC INFUSION 120   10:30 AM   (120 min.)    ONCOLOGY INFUSION   [facility] 5     6     7       8     9     10     [facility] RETURN WITH ROOM    2:00 PM   (60 min.)   Vicki Chris LICSW   [facility]     UMP RETURN    3:00 PM   (30 min.)   Darlene Peck MD   [facility] 11     [facility] MASONIC LAB DRAW   10:15 AM   (15 min.)     M[facility]ON[facility] LAB DRAW   [facility] Masonic Lab Draw     UMP ONC INFUSION 180   10:30 AM   (180 min.)    ONCOLOGY INFUSION   [facility] 12     13     14       15     16     17     18     [facility] LAB DRAW   10:00 AM   (15 min.)    [facility] LAB DRAW   [facility] Masonic Lab Draw     [facility] ONC INFUSION 120   10:30 AM   (120 min.)    ONCOLOGY INFUSION   [facility] 19     20     21       22     23     24     25     [facility] LAB DRAW   10:00 AM   (15 min.)    [facility] LAB DRAW   [facility] Mason[facility] Lab Draw     [facility] ONC INFUSION 180   10:30 AM   (180 min.)    ONCOLOGY INFUSION   [facility] 26     27     28       29     30     31 February 2017 Sunday Monday Tuesday Wednesday Thursday Friday Saturday                  1     [facility] LAB DRAW   10:00 AM   (15 min.)    [facility]     [facility] ONC INFUSION 120   10:30 AM   (120 min.)    ONCOLOGY INFUSION   [facility] 2     3     4       5     6     7     8     9     10     11       12     13     14     15     [facility] LAB DRAW    7:30 AM   (15 min.)   [facility]   [facility]     [facility] RETURN ACTIVE TREATMENT    8:00 AM   (40 min.)   Valentina Haney APRN CNP   [facility]     [facility] ONC INFUSION 180    9:00 AM   (180 min.)    ONCOLOGY INFUSION   [facility] 16     17     18       19     20     21     22     [facility] LAB DRAW    9:30 AM   (15 min.)    [facility] LAB [facility]   [facility]     [facility] ONC INFUSION 120   10:00 AM   (120 min.)    ONCOLOGY INFUSION   [facility] 23     24     25       26     27     28                                      Lab Results:  Recent Results (from the past 12 hour(s))   CBC with platelets differential    Collection Time: 01/04/17 10:41 AM   Result Value Ref Range    WBC 8.4 4.0 - 11.0 10e9/L    RBC  Count 3.80 3.8 - 5.2 10e12/L    Hemoglobin 9.2 (L) 11.7 - 15.7 g/dL    Hematocrit 30.6 (L) 35.0 - 47.0 %    MCV 81 78 - 100 fl    MCH 24.2 (L) 26.5 - 33.0 pg    MCHC 30.1 (L) 31.5 - 36.5 g/dL    RDW 26.4 (H) 10.0 - 15.0 %    Platelet Count 310 150 - 450 10e9/L    Diff Method Automated Method     % Neutrophils 75.4 %    % Lymphocytes 15.0 %    % Monocytes 6.4 %    % Eosinophils 1.8 %    % Basophils 0.4 %    % Immature Granulocytes 1.0 %    Nucleated RBCs 0 0 /100    Absolute Neutrophil 6.3 1.6 - 8.3 10e9/L    Absolute Lymphocytes 1.3 0.8 - 5.3 10e9/L    Absolute Monocytes 0.5 0.0 - 1.3 10e9/L    Absolute Eosinophils 0.2 0.0 - 0.7 10e9/L    Absolute Basophils 0.0 0.0 - 0.2 10e9/L    Abs Immature Granulocytes 0.1 0 - 0.4 10e9/L    Absolute Nucleated RBC 0.0    Magnesium    Collection Time: 01/04/17 10:41 AM   Result Value Ref Range    Magnesium 1.8 1.6 - 2.3 mg/dL   Potassium    Collection Time: 01/04/17 10:41 AM   Result Value Ref Range    Potassium 3.7 3.4 - 5.3 mmol/L   INR    Collection Time: 01/04/17 10:41 AM   Result Value Ref Range    INR 4.81 (H) 0.86 - 1.14               Follow-ups after your visit        Your next 10 appointments already scheduled     Femi 10, 2017  2:00 PM   (Arrive by 1:45 PM)   RETURN WITH ROOM with RHETT Lara,  2 116 CONSULT [facility] ([facility])    [address] [place] [address] [phone]            Femi 10, 2017  3:00 PM   (Arrive by 2:45 PM)   Return Visit with Darlene Peck MD   [facility] ([facility])    [address] [place] [address] [phone]            Jan 11, 2017 10:15 AM   [facility] Lab Draw with  MASONIC LAB DRAW   [facility] Mason[facility] Lab Draw ([facility])    [address] [address]   [phone]            Jan 11, 2017 10:30 AM    Infusion 180 with UC ONCOLOGY INFUSION, UC 22 ATC   [facility] ([facility])    [address] [address]   [phone]            Jan 18, 2017 10:00 AM   [facility] Lab Draw with [facility] LAB DRAW   [facility]    [address] [place] [address]   [phone]            Jan 18, 2017 10:30 AM   Infusion 120 with UC ONCOLOGY INFUSION, UC 30 ATC   [facility] ([facility])    [address] [address]   [phone]            Jan 25, 2017 10:00 AM   Masonic Lab Draw with [facility] MASONIC LAB DRAW   [facility] Masonic Lab Draw ([facility])    [address] [place] [address]   [phone]              Who to contact     If you have questions or need follow up information about today's clinic visit or your schedule please contact [facility] directly at [phone].  Normal or non-critical lab and imaging results will be communicated to you by [employer], letter or phone within 4 business days after the clinic has received the results. If you do not hear from us within 7 days, please contact the clinic through [employer] or phone. If you have a critical or abnormal lab result, we will notify you by phone as soon as possible.  Submit refill requests through [employer] or call your pharmacy and they will forward the refill request to us. Please allow 3 business days for your refill to be completed.          Additional Information About Your Visit        [employer] Information     [employer] gives you secure access to your electronic health record. If you see a primary care provider, you can also send messages to your care team and make appointments. If you have questions, please call your primary care clinic.  If you do not have a primary  care provider, please call [phone] and they will assist you.        Care EveryWhere ID     This is your Care EveryWhere ID. This could be used by other organizations to access your [place] medical records  [id number]        Your Vitals Were     Pulse Temperature Respirations Pulse Oximetry          102 98.3  F (36.8  C) (Oral) 18 96%         Blood Pressure from Last 3 Encounters:   01/04/17 129/70   12/30/16 142/82   12/28/16 133/68    Weight from Last 3 Encounters:   01/04/17 81.149 kg (178 lb 14.4 oz)   12/30/16 81.647 kg (180 lb)   12/28/16 82.328 kg (181 lb 8 oz)              We Performed the Following     CBC with platelets differential     Electrolyte Replacement     INR     Magnesium     Potassium     Treatment Conditions        Primary Care Provider Office Phone # Fax #    [facility] [phone] [address] [facility]. [facility] [address]        Thank you!     Thank you for choosing [facility]  for your care. Our goal is always to provide you with excellent care. Hearing back from our patients is one way we can continue to improve our services. Please take a few minutes to complete the written survey that you may receive in the mail after your visit with us. Thank you!             Your Updated Medication List - Protect others around you: Learn how to safely use, store and throw away your medicines at www.disposemymeds.org.          This list is accurate as of: 1/4/17 11:45 AM.  Always use your most recent med list.                   Brand Name Dispense Instructions for use    albuterol 108 (90 BASE) MCG/ACT Inhaler    albuterol    1 Inhaler    Inhale 2 puffs into the lungs every 6 hours       BENADRYL PO      Take 50 mg by mouth daily as needed       HYDROmorphone 2 MG tablet    DILAUDID    200 tablet    Take 1-2 tablets (2-4 mg) by mouth every 3 hours as needed (dyspnea and??/ or cough)       ipratropium - albuterol 0.5 mg/2.5 mg/3 mL 0.5-2.5 (3)  MG/3ML neb solution    DUONEB    3 mL    Take 1 vial (3 mLs) by nebulization once for 1 dose In clinic neb       * LORazepam 1 MG tablet    ATIVAN    30 tablet    Take 1 tablet (1 mg) by mouth every 6 hours as needed (Anxiety, Nausea/Vomiting or Sleep)       * LORazepam 0.5 MG tablet    ATIVAN    30 tablet    Take 1 tablet (0.5 mg) by mouth every 6 hours as needed (Anxiety, Nausea/Vomiting or Sleep)       omeprazole 20 MG CR capsule    priLOSEC    90 capsule    Take 2 capsules (40 mg) by mouth daily       ondansetron 8 MG tablet    ZOFRAN    30 tablet    Take 1 tablet (8 mg) by mouth every 8 hours as needed for nausea       * order for DME     1 Device    Equipment being ordered: Oxygen 1-2 L NC Keep O2 sats > 90%. RA sats 88%       * order for DME     1 Device    Equipment being ordered: Oxygen 3 liters of oxygen continuous to keep sats >90%. RA sats 88%.       * prochlorperazine 10 MG tablet    COMPAZINE    30 tablet    Take 1 tablet (10 mg) by mouth every 6 hours as needed (nausea/vomiting)       * prochlorperazine 5 MG tablet    COMPAZINE    30 tablet    Take 1 tablet (5 mg) by mouth every 6 hours as needed (Nausea/Vomiting)       senna 8.6 MG tablet    SENOKOT    120 tablet    Take 1-2 tablets by mouth 2 times daily as needed for constipation       sertraline 50 MG tablet    ZOLOFT    30 tablet    Take 1 tablet (50 mg) by mouth daily       sodium chloride 0.65 % nasal spray    OCEAN    88 mL    Spray 1 spray into both nostrils every 2 hours as needed for congestion       umeclidinium-vilanterol 62.5-25 MCG/INH oral inhaler    ANORO ELLIPTA    1 Inhaler    Inhale 1 puff into the lungs daily as needed Alternate with duoneb q6h Therapeutic interchange for Combivent Inhaler.       warfarin 5 MG tablet    COUMADIN    72 tablet    Take 2.5 mg on Tues, Thurs, Sun and 5 mg Mon, Wed, Fri, Sat or as directed by anticoagulation clinic.       * Notice:  This list has 6 medication(s) that are the same as other medications  prescribed for you. Read the directions carefully, and ask your doctor or other care provider to review them with you.

## 2017-01-04 NOTE — PROGRESS NOTES
Infusion Nursing Note:  Pt presents today for C2 D8 Taxol.    WBC: 8.4   Hgb: 9.2   Plt: 310   ANC: 6.3  K: 3.7  Ma.8   Results reviewed, labs MET treatment parameters.    Note: Pt denies any N/V, fever, or chills. Continues to have SOB/Dizziness on exertion. No new issues/concerns noted.  Proceed with treatment.    Intravenous Access:  Implanted Port. No blood return from port. Flushes with ease.  TPA 2mg/2ml instilled into port at 1047. (+) BR from port at 1119.    Post Infusion Assessment:  Patient tolerated infusion without incident.  Blood return noted pre and post infusion.  Port flushed and dc'd intact at end of infusion.    Discharge Plan:   Patient declined prescription refills.  Discharge instructions reviewed with: Patient.  Patient and/or family verbalized understanding of discharge instructions and all questions answered.  Copy of AVS reviewed with patient and/or family.  Pt will return 2017 for next infusion.

## 2017-01-04 NOTE — NURSING NOTE
Chief Complaint   Patient presents with     Port Draw     port accessed by RN, after multiple flushes with no blood return, left antecubital used for lab collection. Infusion nurse notified and is getting order for alteplase, port flushes well so remaining accessed. Vitals taken and pt checked in for next appt     Nicky Hager

## 2017-01-04 NOTE — PATIENT INSTRUCTIONS
Contact Numbers  [facility] Main Line/After Hours: [phone]  [facility] Triage line: [phone]      Please call the triage or after hours line if you experience a temperature greater than or equal to 100.5, shaking chills, have uncontrolled nausea, vomiting and/or diarrhea, dizziness, shortness of breath, chest pain, bleeding, unexplained bruising, or if you have any other new/concerning symptoms, questions or concerns.     If it is after hours, weekends, or holidays, please call the main hospital  at  [phone] and ask to speak to the Oncology doctor on call.     If you are having any concerning symptoms or wish to speak to a provider before your next infusion visit, please call to notify us so we can adequately serve you.     If you need a refill on a narcotic prescription or other medication, please call before your infusion appointment.                     January 2017 Sunday Monday Tuesday Wednesday Thursday Friday Saturday   1     2     3     4     [facility] LAB DRAW   10:00 AM   (15 min.)    [facility]     [facility] ONC INFUSION 120   10:30 AM   (120 min.)    ONCOLOGY INFUSION   [facility] 5     6     7       8     9     10     [facility] RETURN WITH ROOM    2:00 PM   (60 min.)   Vicki Chris LICSW   [facility]     UMP RETURN    3:00 PM   (30 min.)   Darlene Peck MD   [facility] 11     [facility] LAB DRAW   10:15 AM   (15 min.)    MASONIC LAB DRAW   [facility] Masonic Lab Draw     UMP ONC INFUSION 180   10:30 AM   (180 min.)    ONCOLOGY INFUSION   [facility] 12     13     14       15     16     17     18     [facility] LAB DRAW   10:00 AM   (15 min.)    [facility] LAB DRAW   [facility]     [facility] ONC INFUSION 120   10:30 AM   (120 min.)    ONCOLOGY INFUSION   [facility] 19     20     21       22     23     24     25     [facility] LAB  DRAW   10:00 AM   (15 min.)    MASONIC LAB DRAW   [facility] Masonic Lab Draw     UMP ONC INFUSION 180   10:30 AM   (180 min.)   [facility] ONCOLOGY INFUSION   [facility] 26     27     28       29     30     31 February 2017 Sunday Monday Tuesday Wednesday Thursday Friday Saturday                  1     [facility] LAB DRAW   10:00 AM   (15 min.)   [facility]     [facility] ONC INFUSION 120   10:30 AM   (120 min.)   [facility] ONCOLOGY INFUSION   [facility] 2     3     4       5     6     7     8     9     10     11       12     13     14     15     [facility] LAB DRAW    7:30 AM   (15 min.)    [facility]     [facility] RETURN ACTIVE TREATMENT    8:00 AM   (40 min.)   Valentina Haney APRN CNP   [facility]     UMP ONC INFUSION 180    9:00 AM   (180 min.)    ONCOLOGY INFUSION   [facility] 16     17     18       19     20     21     22     [facility] LAB DRAW    9:30 AM   (15 min.)    [facility]     [facility] ONC INFUSION 120   10:00 AM   (120 min.)    ONCOLOGY INFUSION   [facility] 23     24     25       26     27     28                                      Lab Results:  Recent Results (from the past 12 hour(s))   CBC with platelets differential    Collection Time: 01/04/17 10:41 AM   Result Value Ref Range    WBC 8.4 4.0 - 11.0 10e9/L    RBC Count 3.80 3.8 - 5.2 10e12/L    Hemoglobin 9.2 (L) 11.7 - 15.7 g/dL    Hematocrit 30.6 (L) 35.0 - 47.0 %    MCV 81 78 - 100 fl    MCH 24.2 (L) 26.5 - 33.0 pg    MCHC 30.1 (L) 31.5 - 36.5 g/dL    RDW 26.4 (H) 10.0 - 15.0 %    Platelet Count 310 150 - 450 10e9/L    Diff Method Automated Method     % Neutrophils 75.4 %    % Lymphocytes 15.0 %    % Monocytes 6.4 %    % Eosinophils 1.8 %    % Basophils 0.4 %    % Immature Granulocytes 1.0 %    Nucleated RBCs 0 0 /100     Absolute Neutrophil 6.3 1.6 - 8.3 10e9/L    Absolute Lymphocytes 1.3 0.8 - 5.3 10e9/L    Absolute Monocytes 0.5 0.0 - 1.3 10e9/L    Absolute Eosinophils 0.2 0.0 - 0.7 10e9/L    Absolute Basophils 0.0 0.0 - 0.2 10e9/L    Abs Immature Granulocytes 0.1 0 - 0.4 10e9/L    Absolute Nucleated RBC 0.0    Magnesium    Collection Time: 01/04/17 10:41 AM   Result Value Ref Range    Magnesium 1.8 1.6 - 2.3 mg/dL   Potassium    Collection Time: 01/04/17 10:41 AM   Result Value Ref Range    Potassium 3.7 3.4 - 5.3 mmol/L   INR    Collection Time: 01/04/17 10:41 AM   Result Value Ref Range    INR 4.81 (H) 0.86 - 1.14

## 2017-01-06 NOTE — PROGRESS NOTES
ANTICOAGULATION FOLLOW-UP CLINIC VISIT    Patient Name:  Etelvina Jacobs  Date:  1/6/2017  Contact Type:  Telephone    SUBJECTIVE:        OBJECTIVE    INR   Date Value Ref Range Status   01/06/2017 7.20* 0.86 - 1.14 Final     Comment:     Critical Value called to and read back by  NATHALIA SMITH ON 01.06.17 AT 0826 BR         ASSESSMENT / PLAN  INR assessment SUPRA    Recheck INR In: 3 DAYS    INR Location Clinic      Anticoagulation Summary as of 1/6/2017     INR goal 2.0-3.0   Selected INR 7.20! (1/6/2017)   Maintenance plan No maintenance plan   Full instructions 1/6: Hold; 1/7: Hold; 1/8: Hold   Plan last modified Keiko Shoemaker RN (1/6/2017)   Next INR check 1/9/2017   Priority INR   Target end date     Indications   Long-term (current) use of anticoagulants [Z79.01] [Z79.01]  Deep vein thrombosis (DVT) (H) [I82.409] [I82.409]         Anticoagulation Episode Summary     INR check location     Preferred lab     Send INR reminders to [facility] CLINIC    Comments       Anticoagulation Care Providers     Provider Role Specialty Phone number    Fer Garibay, Alberta Narayan MD [facility] Internal Medicine [phone]            See the Encounter Report to view Anticoagulation Flowsheet and Dosing Calendar (Go to Encounters tab in chart review, and find the Anticoagulation Therapy Visit)    Left message with results and dosing recommendations. Asked patient to call back to report any missed doses, falls, signs and symptoms of bleeding or clotting, or any changes to health or diet.     Asked Etelvina to call back to discuss bleeding signs.    Keiko Shoemaker RN    Etelvina called back. She reports that her appetite is not very good. It seems that the high INRs recently have correlated to her starting a chemo regimen in late November. She should not go back on her previous maintenance warfarin dose while on this chemo. She denies bleeding except having some nose bleeds, which have been minor and she felt related to a  scab in her nose. Reviewed the signs that should prompt her to seek emergency care, including any bleeding, any trauma, a severe headache or abdominal pain. She understands this. She will also have a spinach smoothie today. She cannot get in for labs on Monday as requested due to her 's medical appointments, but will do so on Tuesday.

## 2017-01-06 NOTE — TELEPHONE ENCOUNTER
Call from [place] [facility] [place] with critical INR for patient of 7.2. Coumadin Clinic notified.

## 2017-01-10 NOTE — PROGRESS NOTES
Palliative Care Outpatient Clinic Progress Note    Patient Name:  Etelvina Jacobs  Primary Provider:  Clinic, [facility]    Chief Complaint: recurrent metastatic ovarian cancer with lung and brain mets; dyspnea, cough, anxiety    Interim History:  Etelvina Jacobs 66 year old female returns to be seen by palliative care today.  Until last week, cough wasn't as bothersome as it is now. Starting last week, tickle in throat and then uncontrolled coughing and at times gets dizzy from coughing so much which is exhausting her as well. When in coughing spasms, feels like going to vomit and then just worn out afterwards. Using her oxygen all the time now as feeling short of breath when talking.      taking 2 tablets of hydromorphone (4mg) 2-3 times a day and usually the cough resolves within 5-10 minutes. Doesn't want to take it more than 3 times in a day and sometimes trys to not take it at all.      Little constipation but not bad and taking senna when needed    Appetite - not good - hardly have any appetite. Down to 175lbs. Trying to avoid sweets. Wanted to lose weight but not this way.     Not really sleeping at night. Tossing and turning at night but hs been that way for years but the coughing is not helping. Used to wake up not being able to catch breath but that isn't as bad now.     Reading and coloring and watching tv. Can concentrate enough to color. Usually falls asleep with reading.     Zoloft just increased as hoping it will help to decrease anxiety.      Review of Systems:  ROS: 10 point ROS neg other than the symptoms noted above in the HPI and here  No rashes    Social History:    Advance Directive Status:  DNR/DNI        Allergies   Allergen Reactions     Carboplatin Difficulty breathing     Reaction to Carboplatin on 8th dose.      Current Outpatient Prescriptions   Medication Sig Dispense Refill     sertraline (ZOLOFT) 50 MG tablet Take 1 tablet (50 mg) by mouth daily 30 tablet 3     sodium chloride  (OCEAN) 0.65 % nasal spray Spray 1 spray into both nostrils every 2 hours as needed for congestion 88 mL 3     umeclidinium-vilanterol (ANORO ELLIPTA) 62.5-25 MCG/INH oral inhaler Inhale 1 puff into the lungs daily as needed Alternate with duoneb q6h  Therapeutic interchange for Combivent Inhaler. 1 Inhaler 11     albuterol (ALBUTEROL) 108 (90 BASE) MCG/ACT Inhaler Inhale 2 puffs into the lungs every 6 hours 1 Inhaler 11     HYDROmorphone (DILAUDID) 2 MG tablet Take 1-2 tablets (2-4 mg) by mouth every 3 hours as needed (dyspnea and  / or cough) 200 tablet 0     LORazepam (ATIVAN) 0.5 MG tablet Take 1 tablet (0.5 mg) by mouth every 6 hours as needed (Anxiety, Nausea/Vomiting or Sleep) 30 tablet 1     prochlorperazine (COMPAZINE) 5 MG tablet Take 1 tablet (5 mg) by mouth every 6 hours as needed (Nausea/Vomiting) 30 tablet 2     senna (SENOKOT) 8.6 MG tablet Take 1-2 tablets by mouth 2 times daily as needed for constipation 120 tablet 3     order for DME Equipment being ordered: Oxygen 3 liters of oxygen continuous to keep sats >90%. RA sats 88%. 1 Device 0     warfarin (COUMADIN) 5 MG tablet Take 2.5 mg on Tues, Thurs, Sun and 5 mg Mon, Wed, Fri, Sat or as directed by anticoagulation clinic. 72 tablet 0     DiphenhydrAMINE HCl (BENADRYL PO) Take 50 mg by mouth daily as needed       ondansetron (ZOFRAN) 8 MG tablet Take 1 tablet (8 mg) by mouth every 8 hours as needed for nausea 30 tablet 2     omeprazole (PRILOSEC) 20 MG capsule Take 2 capsules (40 mg) by mouth daily 90 capsule 6     order for DME Equipment being ordered: Oxygen 1-2 L NC Keep O2 sats > 90%. 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) 3/24/11     Ovarian cancer (H) 12/09     Stage IC grade 3 ovarian cancer     Hypertension      Hyperlipidemia      Pulmonary nodules      Shortness of breath      Renal disease      insuffiency     Gastro-oesophageal reflux disease      Anxiety      Depression      Past Surgical History   Procedure Laterality Date     Vulva surgery  3/24/11     VAIN III     Hysterectomy radical  12/09     Stage IC grade 3 ovarian cancer,     Thoracoscopic wedge resection lung  11/16/2011     Procedure:THORACOSCOPIC WEDGE RESECTION LUNG; Left Thoracoscopic  Lower Lobe Wedge Resection with Pleura; Surgeon:TAMARA SERVIN; Location:[facility] OR     Biopsy vaginal  1/31/2013     Procedure: BIOPSY VAGINAL;  Colposcopy, CO2 Laser to Vagina, Upper Vaginal biopsies;  Surgeon: Marietta Sexton MD;  Location: [facility] OR     Laser co2 vagina  1/31/2013     Procedure: LASER CO2 VAGINA;;  Surgeon: Marietta Sexton MD;  Location: [facility] OR           /76 mmHg  Pulse 116  Temp(Src) 99  F (37.2  C) (Oral)  Resp 16  Wt 79.606 kg (175 lb 8 oz)  SpO2 95%  General: ill appearing, well groomed, sitting in chair, in NAD  Eyes: EOMI, sclera clear  HEENT: NC/AT; mucous membranes moist  Lungs: mild increased work of breathing when talking, CTA b/l without wheezing  Heart: RRR  Abdomen: soft, non tender to palpation, no round or guarding  Neuro: A&O x 3; CN II-XII grossly intact;   Neuropsych: alert, affect slightly flat, good eye contact, engaged, sensorium intact.       Key Data Reviewed:  GFR 39 - fluctuates between 30-39      Impression:     Etelvina is a 66 yr old female with recurrent metastatic ovarian cancer: She is getting palliative chemotherapy with taxol and avastin with hope that it can help her feel better and give her more time. She has sig dyspnea with talking and  her cough is worse past week.    Recommendations & Counseling:    Cough and shortness of breath:   - start Oxycontin 10mg twice a day, everyday even if you are no coughing. Take one pill around 8am and then other around 8pm.   - continue hydromorphone 1-2 tablets (2-4mg) every 3 hours if needed for cough and shortness of breath.   - we will call her on Friday and if hasn't been able to start oxycontin and cough still very bothersome, would add decadron 4mg every morning x 7 days then stop.   Anxiety:   - continue with increased dose of zoloft 50mg qd. May increase at next visit.   Constipation prevention: need to have a bowel movement every day.   - Take senna as needed or you can schedule it and take 1-2 tablets twice a day. May increase up to 4 tablets twice a day if needed for a daily bowel movement and may need to decrease if you are having diarrhea or loose bowel movements.     Follow up in 4-6 weeks.     Thank you for involving us in the patient's care. 30 minutes face time over half spent counseling.  Darlene Peck MD / Palliative Medicine / Pager [phone] / After-Hours Answering Service [phone] / Main Palliative Clinic - [phone] / [facility] Inpatient Team Consult Pager [phone] (answered 8am-430pm M-F)    Answers for HPI/ROS submitted by the patient on 12/27/2016   General Symptoms: Yes  Skin Symptoms: No  HENT Symptoms: Yes  EYE SYMPTOMS: No  HEART SYMPTOMS: Yes  LUNG SYMPTOMS: Yes  INTESTINAL SYMPTOMS: No  URINARY SYMPTOMS: Yes  GYNECOLOGIC SYMPTOMS: No  BREAST SYMPTOMS: No  SKELETAL SYMPTOMS: Yes  BLOOD SYMPTOMS: No  NERVOUS SYSTEM SYMPTOMS: Yes  MENTAL HEALTH SYMPTOMS: Yes  Fever: No  Loss of appetite: No  Weight loss: No  Weight gain: No  Fatigue: Yes  Night sweats: No  Chills: No  Increased stress: Yes  Excessive hunger: No  Excessive thirst: No  Feeling hot or cold when others believe the temperature is normal: No  Loss of height: No  Post-operative complications: No  Surgical site  pain: No  Hallucinations: No  Change in or Loss of Energy: Yes  Hyperactivity: No  Confusion: Yes  Ear pain: No  Ear discharge: No  Hearing loss: No  Tinnitus: Yes  Nosebleeds: Yes  Congestion: Yes  Sinus pain: No  Trouble swallowing: No   Voice hoarseness: No  Mouth sores: Yes  Sore throat: No  Tooth pain: No  Gum tenderness: No  Bleeding gums: No  Change in taste: No  Change in sense of smell: No  Dry mouth: No  Hearing aid used: No  Neck lump: No  Cough: Yes  Sputum or phlegm: No  Coughing up blood: No  Difficulty breating or shortness of breath: Yes  Snoring: No  Wheezing: Yes  Difficulty breathing on exertion: Yes  Respiratory pain: No  Nighttime Cough: Yes  Difficulty breathing when lying flat: Yes  Chest pain or pressure: No  Fast or irregular heartbeat: Yes  Pain in legs with walking: Yes  Swelling in feet or ankles: Yes  Trouble breathing while lying down: Yes  Fingers or Toes appear blue: No  High blood pressure: No  Low blood pressure: No  Fainting: No  Murmurs: No  Chest pain on exertion: No  Chest pain at rest: No  Cramping pain in leg during exercise: Yes  Pacemaker: No  Varicose veins: No  Edema or swelling: No  Fast heart beat: Yes  Wake up at night with shortness of breath: Yes  Heart flutters: No  Light-headedness: Yes  Exercise intolerance: Yes  Trouble holding urine or incontinence: Yes  Pain or burning: No  Trouble starting or stopping: No  Increased frequency of urination: No  Blood in urine: No  Decreased frequency of urination: No  Frequent nighttime urination: Yes  Flank pain: No  Difficulty emptying bladder: No  Back pain: No  Muscle aches: Yes  Neck pain: No  Swollen joints: Yes  Joint pain: Yes  Bone pain: No  Muscle cramps: No  Muscle weakness: No  Joint stiffness: Yes  Bone fracture: No  Trouble with coordination: No  Dizziness or trouble with balance: Yes  Fainting or black-out spells: No  Memory loss: No  Headache: No  Seizures: No  Speech problems: No  Tingling: Yes  Tremor:  No  Weakness: Yes  Difficulty walking: Yes  Paralysis: No  Numbness: Yes  Nervous or Anxious: Yes  Depression: Yes  Trouble sleeping: Yes  Trouble thinking or concentrating: Yes  Mood changes: Yes  Panic attacks: Yes

## 2017-01-10 NOTE — Clinical Note
1/10/2017       RE: Etelvina Jacobs  [address]  [place] [address]     Dear Colleague,    Thank you for referring your patient, Etelvina Jacobs, to the [facility] at [facility]. Please see a copy of my visit note below.    Palliative Care Clinical Social Work Return Appointment    PLEASE NOTE:  THIS IS A MENTAL HEALTH NOTE.  OTHER PROVIDERS VIEWING THIS NOTE SHOULD USE THIS ONLY FOR UNDERSTANDING THE CONTEXT OF THE PATIENT S EXPERIENCE.  TOPICS DESCRIBED IN THIS NOTE SHOULD NOT BE REFERENCED TO THE PATIENT BY MEDICAL PROVIDERS.    Etelvina is a 66 year old woman with metastatic ovarian cancer, seen today for a return appointment. She invited her  to sit in on the appointment. He seemed to be a supportive presence but said little today.    Mental Status Exam:(List all that apply)      Appearance: Appropriate      Eye Contact: Good       Orientation: Yes, x4      Mood: depressed, tired, anxious      Affect: Anxious, flat, slower than typical speech      Thought Content: Clear         Thought Form: Logical      Psychomotor Behavior: Slower movements than typical - using a cane to walk - short of breath - slight blueness of lips. (MD visit after this to assess further)    Visit summary:   Mental Health (Anxiety, depression, other symptoms):   Mood is markedly depressed today. I wonder if this may also be related to pulmonary function since she is more fatigued, more short of breath, and using cane today, a change from past visits with me. Anhedonia and disengagement from activities usually enjoyed/ found helpful for coping - less drawing, less talking with sister, less reading. Wishes she could get outside and walk since physical activity has been key for coping/ maintaining mental health. However, in cold temps it is hard to breathe outside. She worries this is laziness on her part and I provide normalization re: the impact of temperature, wind,  "humidity for those with compromised lung function.     Significant anxiety is also present with fears that she will be told there are no more chemo options/ being prevented from continuing chemo by oncology team. There is increased fear of being alone.  has been with her most of the time. He was gone a few hours yesterday and she was able to cope by sleeping/ snuggling with the dog. Several times recently she has started hyperventilating; it has been helpful to hold her 's hand during these episodes; she tends to take an oxycodone and then fall asleep holding his hand.    Focus of visit today was motivational interviewing, exploring and supporting use of existing coping skills and sources of deborah, exploration of helpful cognitions, and accessing positive memories and internal resources. Helpful thoughts: \"Maybe the chemo will help.\" and \"I have a pretty good life.\" Coping options: coloring; reading (but harder to focus recently - likes romances, westerns); once weather is warmer, getting outside; planning for being outdoors/ camping this summer if possible. Positive memories:     Depressed mood is also connected with anticipatory grief, and I offered reflection and psychoeducation focused on this. Also facilitated/ invited life review. Etelvina expressed re: her daughter Jewels being part of her life only till age 10: \"I guess it was God's plan, but it is heartbreaking.\" (Note: Jewels's birthday is Jan 22). She remembers caring for Jewels as a baby with a lot of deborah. She shared stories of her marriage in 1984 -  - a pale lavender dress - going out drinking later with friends and falling. Mixed feelings about the day. Memories of times outdoors remain source of comfort and pleasure.    Relaxation training reviewed today.    Relaxation Training:  Relaxation training was discussed and explained and any questions were answered.  Preferences were determined.  Patient was then led in a " "relaxation training.    Method of increasing internal focus:  Close eyes, progressive muscle relaxation (warm wave)  Techniques used/taught:  Progressive muscle relaxation. Favorite place - [facility] in woods.  Method of returning patient to usual awake/alert state:  Move toes, then fingers, then open eyes  Patient report following training session:  \"It was nice to be there. I feel a little better. I had a chair that I was sitting in, in the trees by the water.\"    Adjustment to Illness: Cough has worsened recently with increased use of oxycodone - she is tracking each time she takes the pills and shows me small book she uses for this. She will see Dr Peck today to review and assess plan. In the past the medication seemed to resolve the problems with coughing but these have returned. She finds she is needing more medication often in recent days. As noted above, there is significant fear about dying, and fear that the oncology team may think hospice is appropriate, while she continues to want more time alive if at all possible, including continued chemo. She tells me her cancer comes back rapidly when she is off chemo. She tells me her impression is that \"I probably don't have that long.\" Sadness and fear are prominent.    Relationships: Wanting  with her at all times if possible. He is retired/ disabled so able to do this. Otherwise, she describes very limited supports. Sees sister only occasionally. There is shame about clutter in the house; she used to do all housekeeping and was very organized but now too fatigued. We discuss possible referral to [facility] navigator to see if there may be housekeeping or other volunteer visitor services available. She is receptive to the referral to find out. Sees 's children/ grandchildren very rarely. Dog, Carlin, is a source of deborah. Etelvina brightens in talking about his antics and affection.    End of Life and Advance Care Planning: As noted above, significant " "fear about dying. Voicing that she understands it may be coming soon. Some grief, anger, fear, sadness. Strong desire to live longer, to continue chemo as long as it is an option.    Internal Resources: See above - exploration of positive memories, especially time outdoors, by lakes, in woods. Mixed feelings about memories of caring for young daughter. Practice of relaxation exercise. Motivational interviewing re: benefits of drawing, reading, any \"mental vacation.\"    Main therapeutic interventions provided this session include:  Facilitating processing of feelings and thoughts, facilitating life review; re-framing; behavioral/ body mind skills training; motivational interviewing; psychoeducation    Plan:  Etelvina will return for psychotherapy with palliative care focus on Tuesday Jan 24 at [facility].    Time spent with patient/family:  50 minutes    Palliative Care Counseling Treatment Plan    Client's Name: Etelvina Jacobs  YOB: 1950    Date: 11/23/2016    Initial DSM5 Diagnoses:   296.32 Major Depressive Disorder, Recurrent Episode, Moderate With mixed features  300.01 (F41.0) Panic Disorder  300.02 (F41.1) Generalized Anxiety Disorder      Referral / Collaboration:  .Referral to another professional/service is not indicated at this time.      Anticipated number of sessions to complete episode of care:  10      Treatment Goal(s)  Date Goal Dates  Reviewed Status    Goal 1:  Client will Experience reduction in anxiety..      New    Objective #1A (Client Action)  Client will Identify triggers for anxiety/panic attacks, Identify early signs/symptoms of anxiety and Practice self awareness exercises.    Intervention(s)  Therapist will Provide psychoeducation and Facilitate structured problem solving .    New    Objective #1B  Client will Identify effective coping strategies.    Intervention(s)  Therapist will Provide psychoeducation, Provide behavioral intervention training and Facilitate structured problem " solving. Provide motivational interviewing.    New    Objective #1C: Client will complete advance health care directive.    Interventions:  Therapist will facilitate advance care planning as well as supporting patient in facing anxiety related barriers to addressing this.             Date Goal Dates  Reviewed Status    Goal 2:  Client will effectively address grief / loss and depressed mood.    New    Objective #2A (Client Action)  Client will Increase understanding of loss and grief and Develop strategies for coping with grief/loss.    Intervention(s) (Therapist Action)  Therapist will Provide psychoeducation, Facilitate processing of thoughts and feelings and Facilitate structured problem solving.    New    Objective #2B  Client will Identify and use personally meaningful ritual(s) and Participate in Life Legacy work and Participate in life review.    Intervention(s)  Therapist will Provide psychoeducation, Facilitate Life Legacy work and Facilitate processing of thoughts and feelings.    New       Client reviewed and agreed to this plan on Dec 14, 2016.    PARAM Jiang, [facility]      DO NOT SEND ANY LETTERS        Note: error below - Etelvina has malignant neoplasm metastatic to both lungs, not metastatic ovarian cancer.      Again, thank you for allowing me to participate in the care of your patient.      Sincerely,    JOHNY Tiwari

## 2017-01-10 NOTE — NURSING NOTE
"Etelvina Jacobs is a 66 year old female who presents for:  Chief Complaint   Patient presents with     Palliative        Initial Vitals:  /76 mmHg  Pulse 116  Temp(Src) 99  F (37.2  C) (Oral)  Resp 16  Wt 79.606 kg (175 lb 8 oz)  SpO2 95% Estimated body mass index is 32.09 kg/(m^2) as calculated from the following:    Height as of 12/28/16: 1.575 m (5' 2\").    Weight as of this encounter: 79.606 kg (175 lb 8 oz).. There is no height on file to calculate BSA. BP completed using cuff size: regular  No Pain (0) No LMP recorded. Patient has had a hysterectomy. Allergies and medications reviewed.     Medications: MEDICATION REFILLS NEEDED TODAY.  Pharmacy name entered into EPIC:    [facility] - [place], [place] - [address] [facility] [place], [place] - [address] [place] [address] [phone]  [facility] [address] [place] [id number] [facility] [address]    Comments: Patient is not having any pain today.     6 minutes for nursing intake (face to face time)   Jaelyn Sanabria CMA         "

## 2017-01-10 NOTE — PROGRESS NOTES
ANTICOAGULATION FOLLOW-UP CLINIC VISIT    Patient Name:  Etelvina Jacobs  Date:  1/10/2017  Contact Type:  Telephone    SUBJECTIVE:        OBJECTIVE    INR   Date Value Ref Range Status   01/10/2017 1.69* 0.86 - 1.14 Final       ASSESSMENT / PLAN  INR assessment SUB    Recheck INR In: 3 DAYS    INR Location Clinic      Anticoagulation Summary as of 1/10/2017     INR goal 2.0-3.0   Selected INR 1.69! (1/10/2017)   Maintenance plan No maintenance plan   Full instructions 1/10: 2.5 mg; 1/11: 2.5 mg; 1/12: 2.5 mg   Plan last modified Keiko Shoemaker, RN (1/6/2017)   Next INR check 1/13/2017   Priority INR   Target end date     Indications   Long-term (current) use of anticoagulants [Z79.01] [Z79.01]  Deep vein thrombosis (DVT) (H) [I82.409] [I82.409]         Anticoagulation Episode Summary     INR check location     Preferred lab     Send INR reminders to [facility]    Comments       Anticoagulation Care Providers     Provider Role Specialty Phone number    Alberta Deal MD [facility] Internal Medicine [phone]            See the Encounter Report to view Anticoagulation Flowsheet and Dosing Calendar (Go to Encounters tab in chart review, and find the Anticoagulation Therapy Visit)    Spoke with Etelvina Cancino RN

## 2017-01-10 NOTE — PATIENT INSTRUCTIONS
Recommendations:    Cough and shortness of breath:   - start Oxycontin 10mg twice a day, everyday even if you are no coughing. Take one pill around 8am and then other around 8pm.   - continue hydromorphone 1-2 tablets (2-4mg) every 3 hours if needed for cough and shortness of breath.     Constipation prevention: need to have a bowel movement every day.   - Take senna as needed or you can schedule it and take 1-2 tablets twice a day. May increase up to 4 tablets twice a day if needed for a daily bowel movement and may need to decrease if you are having diarrhea or loose bowel movements.     Follow up:    4 - 6 weeks    Reasons to Call    If you are having worsening/uncontrolled symptoms we want you to call!    You or your other physicians make any changes to medications we have prescribed.      Important Phone Numbers    Celi Lamas. Palliaitve LPN. Answered 8 am to 4 pm Tuesday - Friday.    Appointment Line.[phone]     After hours. Will connect you with on call MD. [phone]      Darlene Peck MD  Palliative Care Physician  Clinic Number [phone]

## 2017-01-10 NOTE — MR AVS SNAPSHOT
After Visit Summary   1/10/2017    Etelvina Jacobs    MRN: [id number]           Patient Information     Date Of Birth          1950        Visit Information        Provider Department      1/10/2017 3:00 PM Darlene Peck MD [facility]        Today's Diagnoses     Shortness of breath    -  1     Malignant neoplasm metastatic to both lungs (H)           Care Instructions    Recommendations:    Cough and shortness of breath:   - start Oxycontin 10mg twice a day, everyday even if you are no coughing. Take one pill around 8am and then other around 8pm.   - continue hydromorphone 1-2 tablets (2-4mg) every 3 hours if needed for cough and shortness of breath.     Constipation prevention: need to have a bowel movement every day.   - Take senna as needed or you can schedule it and take 1-2 tablets twice a day. May increase up to 4 tablets twice a day if needed for a daily bowel movement and may need to decrease if you are having diarrhea or loose bowel movements.     Follow up:    4 - 6 weeks    Reasons to Call    If you are having worsening/uncontrolled symptoms we want you to call!    You or your other physicians make any changes to medications we have prescribed.      Important Phone Numbers    Celi Lamas. Palliaitve LPN. Answered 8 am to 4 pm Tuesday - Friday.    Appointment Line.[phone]     After hours. Will connect you with on call MD. [phone]      Darlene Peck MD  Palliative Care Physician  Clinic Number [phone]                    Follow-ups after your visit        Your next 10 appointments already scheduled     Jan 11, 2017 10:15 AM   [facility] Lab Draw with  [facility] LAB DRAW   [facility] ([facility])    [address] [address] [phone]            Jan 11, 2017 10:30 AM   Infusion 180 with  ONCOLOGY INFUSION, UC 22 ATC   [facility] ([facility]  [facility])    [address] [place] [address]   [phone]            Jan 18, 2017 10:00 AM   [facility] Lab Draw with UC [facility]ON[facility] LAB DRAW   [facility] Draw ([facility])    [address] [place] [address]   [phone]            Jan 18, 2017 10:30 AM   Infusion 120 with UC ONCOLOGY INFUSION, UC 30 ATC   [facility] ([facility])    [address] [place] [address]   [phone]            Jan 25, 2017 10:00 AM   [facility] Lab Draw with UC [facility] LAB DRAW   [facility] Draw ([facility])    [address] [place] [address]   [phone]            Jan 25, 2017 10:30 AM   Infusion 180 with UC ONCOLOGY INFUSION, UC 22 ATC   [facility] ([facility])    [address] [place] [address]   [phone]            Feb 01, 2017 10:00 AM   [facility] Lab Draw with UC [facility] LAB DRAW   [facility] Lab Draw ([facility])    [address] [place] [address]   [phone]              Who to contact     If you have questions or need follow up information about today's clinic visit or your schedule please contact [facility] directly at [phone].  Normal or non-critical lab and imaging results will be communicated to you by MyChart, letter or phone within 4 business days after the clinic has received the results. If you do not hear from us within 7 days, please contact the clinic through MyChart or phone. If you have a critical or abnormal lab result, we will notify you by phone as soon as possible.  Submit refill requests through [employer] or call your pharmacy and they will forward the refill request to us. Please allow 3 business days for your refill to  be completed.          Additional Information About Your Visit        [employer] Information     P21 gives you secure access to your electronic health record. If you see a primary care provider, you can also send messages to your care team and make appointments. If you have questions, please call your primary care clinic.  If you do not have a primary care provider, please call [phone] and they will assist you.        Care EveryWhere ID     This is your Care EveryWhere ID. This could be used by other organizations to access your [facility] medical records  [id number]        Your Vitals Were     Pulse Temperature Respirations Pulse Oximetry          116 99  F (37.2  C) (Oral) 16 95%         Blood Pressure from Last 3 Encounters:   01/10/17 117/76   01/04/17 129/70   12/30/16 142/82    Weight from Last 3 Encounters:   01/10/17 79.606 kg (175 lb 8 oz)   01/04/17 81.149 kg (178 lb 14.4 oz)   12/30/16 81.647 kg (180 lb)              Today, you had the following     No orders found for display         Today's Medication Changes          These changes are accurate as of: 1/10/17  4:02 PM.  If you have any questions, ask your nurse or doctor.               Start taking these medicines.        Dose/Directions    oxyCODONE 10 MG 12 hr tablet   Commonly known as:  OXYCONTIN   Used for:  Shortness of breath, Malignant neoplasm metastatic to both lungs (H)   Started by:  Darlene Peck MD        Take one tablet around 8am and 8pm.   Quantity:  60 tablet   Refills:  0            Where to get your medicines      Some of these will need a paper prescription and others can be bought over the counter.  Ask your nurse if you have questions.     Bring a paper prescription for each of these medications    - HYDROmorphone 2 MG tablet  - oxyCODONE 10 MG 12 hr tablet             Primary Care Provider Office Phone # Fax #    [facility] [phone] [address] [facility] [address]         Thank you!     Thank you for choosing [facility]  for your care. Our goal is always to provide you with excellent care. Hearing back from our patients is one way we can continue to improve our services. Please take a few minutes to complete the written survey that you may receive in the mail after your visit with us. Thank you!             Your Updated Medication List - Protect others around you: Learn how to safely use, store and throw away your medicines at www.disposemymeds.org.          This list is accurate as of: 1/10/17  4:02 PM.  Always use your most recent med list.                   Brand Name Dispense Instructions for use    albuterol 108 (90 BASE) MCG/ACT Inhaler    albuterol    1 Inhaler    Inhale 2 puffs into the lungs every 6 hours       BENADRYL PO      Take 50 mg by mouth daily as needed       HYDROmorphone 2 MG tablet    DILAUDID    200 tablet    Take 1-2 tablets (2-4 mg) by mouth every 3 hours as needed (dyspnea and??/ or cough)       ipratropium - albuterol 0.5 mg/2.5 mg/3 mL 0.5-2.5 (3) MG/3ML neb solution    DUONEB    3 mL    Take 1 vial (3 mLs) by nebulization once for 1 dose In clinic neb       * LORazepam 1 MG tablet    ATIVAN    30 tablet    Take 1 tablet (1 mg) by mouth every 6 hours as needed (Anxiety, Nausea/Vomiting or Sleep)       * LORazepam 0.5 MG tablet    ATIVAN    30 tablet    Take 1 tablet (0.5 mg) by mouth every 6 hours as needed (Anxiety, Nausea/Vomiting or Sleep)       omeprazole 20 MG CR capsule    priLOSEC    90 capsule    Take 2 capsules (40 mg) by mouth daily       ondansetron 8 MG tablet    ZOFRAN    30 tablet    Take 1 tablet (8 mg) by mouth every 8 hours as needed for nausea       * order for DME     1 Device    Equipment being ordered: Oxygen 1-2 L NC Keep O2 sats > 90%. RA sats 88%       * order for DME     1 Device    Equipment being ordered: Oxygen 3 liters of oxygen continuous to keep sats >90%. RA sats 88%.       oxyCODONE 10 MG 12 hr tablet     OXYCONTIN    60 tablet    Take one tablet around 8am and 8pm.       * prochlorperazine 10 MG tablet    COMPAZINE    30 tablet    Take 1 tablet (10 mg) by mouth every 6 hours as needed (nausea/vomiting)       * prochlorperazine 5 MG tablet    COMPAZINE    30 tablet    Take 1 tablet (5 mg) by mouth every 6 hours as needed (Nausea/Vomiting)       senna 8.6 MG tablet    SENOKOT    120 tablet    Take 1-2 tablets by mouth 2 times daily as needed for constipation       sertraline 50 MG tablet    ZOLOFT    30 tablet    Take 1 tablet (50 mg) by mouth daily       sodium chloride 0.65 % nasal spray    OCEAN    88 mL    Spray 1 spray into both nostrils every 2 hours as needed for congestion       umeclidinium-vilanterol 62.5-25 MCG/INH oral inhaler    ANORO ELLIPTA    1 Inhaler    Inhale 1 puff into the lungs daily as needed Alternate with duoneb q6h Therapeutic interchange for Combivent Inhaler.       warfarin 5 MG tablet    COUMADIN    72 tablet    Take 2.5 mg on Tues, Thurs, Sun and 5 mg Mon, Wed, Fri, Sat or as directed by anticoagulation clinic.       * Notice:  This list has 6 medication(s) that are the same as other medications prescribed for you. Read the directions carefully, and ask your doctor or other care provider to review them with you.

## 2017-01-11 NOTE — PATIENT INSTRUCTIONS
Contact Numbers  [facility] Nurse Triage: [phone]  After Hours Nurse Line:  [phone]  Hospital Main Line:  [phone]    Please call the [facility] Triage line if you experience a temperature greater than or equal to 100.5, shaking chills, have uncontrolled nausea, vomiting and/or diarrhea, dizziness, shortness of breath, chest pain, bleeding, unexplained bruising, or if you have any other new/concerning symptoms, questions or concerns.     If it is after hours, weekends, or holidays, please call either the after hours nurse line listed above or the main hospital  at  [phone] and ask to speak to the Oncology doctor on call.     If you are having any concerning symptoms or wish to speak to a provider before your next infusion visit, please call your care coordinator or triage to notify them so we can adequately serve you.     If you need a refill on a narcotic prescription or other medication, please call triage before your infusion appointment.         January 2017 Sunday Monday Tuesday Wednesday Thursday Friday Saturday   1     2     3     4     [facility] LAB DRAW   10:00 AM   (15 min.)    [facility] LAB DRAW   [facility] Draw     [facility] ONC INFUSION 120   10:30 AM   (120 min.)    ONCOLOGY INFUSION   [facility] 5     6     LAB    8:30 AM   (15 min.)   AN LAB   [facility] 7       8     9     10     LAB    1:00 PM   (15 min.)    LAB   [facility] Lab     P RETURN WITH ROOM    2:00 PM   (60 min.)   Vicki Chris LICSW   [facility]     UMP RETURN    3:00 PM   (30 min.)   Darlene Peck MD   [facility] 11     [facility] LAB DRAW   10:15 AM   (15 min.)    [facility] LAB DRAW   [facility] Draw     [facility] ONC INFUSION 180   10:30 AM   (180 min.)    ONCOLOGY INFUSION   [facility] 12     13     14       15     16     17     18     P [facility] LAB DRAW   10:00  AM   (15 min.)    [facility] LAB DRAW   [facility] Lab Draw     [facility] ONC INFUSION 120   10:30 AM   (120 min.)    ONCOLOGY INFUSION   [facility] 19     20     21       22     23     24     25     [facility] DRAW   10:00 AM   (15 min.)    [facility]   [facility]     [facility] ONC INFUSION 180   10:30 AM   (180 min.)    ONCOLOGY INFUSION   [facility] 26     [facility] RETURN ACTIVE TREATMENT    2:20 PM   (20 min.)   Erica Markham MD   [facility] 27     28       29     30     31 February 2017 Sunday Monday Tuesday Wednesday Thursday Friday Saturday                  1     [facility] DRAW   10:00 AM   (15 min.)    [facility] LAB DRAW   [facility] Lab Draw     [facility] ONC INFUSION 120   10:30 AM   (120 min.)    ONCOLOGY INFUSION   [facility] 2     3     4       5     6     7     [facility] RETURN    1:00 PM   (30 min.)   Darlene Peck MD   [facility] 8     9     10     11       12     13     14     15     [facility] DRAW    7:30 AM   (15 min.)    [facility] LAB DRAW   [facility] Lab Draw     [facility] RETURN ACTIVE TREATMENT    8:00 AM   (40 min.)   Valentina Haney APRN CNP   [facility]     [facility] ONC INFUSION 180    9:00 AM   (180 min.)    ONCOLOGY INFUSION   [facility] 16     17     18       19     20     21     22     [facility] DRAW    9:30 AM   (15 min.)    [facility] LAB DRAW   [facility] Lab Draw     [facility] ONC INFUSION 120   10:00 AM   (120 min.)    ONCOLOGY INFUSION   [facility] 23     24     25       26     27     28                                      Lab Results:  Recent Results (from the past 12 hour(s))   CBC with platelets differential    Collection Time: 01/11/17 11:00 AM   Result Value Ref Range    WBC 4.4 4.0 - 11.0 10e9/L    RBC Count 3.67 (L) 3.8 - 5.2  10e12/L    Hemoglobin 9.1 (L) 11.7 - 15.7 g/dL    Hematocrit 30.0 (L) 35.0 - 47.0 %    MCV 82 78 - 100 fl    MCH 24.8 (L) 26.5 - 33.0 pg    MCHC 30.3 (L) 31.5 - 36.5 g/dL    RDW 26.7 (H) 10.0 - 15.0 %    Platelet Count 275 150 - 450 10e9/L    Diff Method Automated Method     % Neutrophils 57.5 %    % Lymphocytes 28.7 %    % Monocytes 9.6 %    % Eosinophils 3.2 %    % Basophils 0.5 %    % Immature Granulocytes 0.5 %    Nucleated RBCs 0 0 /100    Absolute Neutrophil 2.5 1.6 - 8.3 10e9/L    Absolute Lymphocytes 1.3 0.8 - 5.3 10e9/L    Absolute Monocytes 0.4 0.0 - 1.3 10e9/L    Absolute Eosinophils 0.1 0.0 - 0.7 10e9/L    Absolute Basophils 0.0 0.0 - 0.2 10e9/L    Abs Immature Granulocytes 0.0 0 - 0.4 10e9/L    Absolute Nucleated RBC 0.0    Magnesium    Collection Time: 01/11/17 11:00 AM   Result Value Ref Range    Magnesium 2.0 1.6 - 2.3 mg/dL   Potassium    Collection Time: 01/11/17 11:00 AM   Result Value Ref Range    Potassium 3.9 3.4 - 5.3 mmol/L   Protein qualitative urine    Collection Time: 01/11/17 11:10 AM   Result Value Ref Range    Protein Albumin Urine Negative NEG mg/dL

## 2017-01-11 NOTE — PROGRESS NOTES
Infusion Nursing Note:  Etelvina Jacobs presents today for Cycle 2 Day 15 Taxol, Avastin.    Patient seen by provider today: No    Note: Pt arrived appearing SOB and unstable on her feet. Pt stated she was tired and spent most of the past week in bed resting. Feels dizzy when she starts coughing. She also has some anxiety related to her breathing. Eats one meal a day and sometimes another snack. Denies any fevers/chills/new pain or other new concerns. Saw Palliative Care yesterday who started her on MS Contin and PRN Dilaudid to help with her breathing. She took her first dose of Oxycontin this morning.  Discussed all concerns with Valentian Haney CNP. Issues seem to be ongoing and pt is aware she can stop chemo at any time. Discussed further with pt and pt would like to continue receiving chemotherapy. Counts met parameters today and pt stated she wanted to receive chemo, but is aware to call for worsening breathing, pain or other new concerns. Aware she can stop chemo at any time.    Intravenous Access:  Implanted Port.  Port with no blood return upon access. TPA instilled and + blood return noted after 60 minutes.    Treatment Conditions:  HGB      9.1   1/11/2017  WBC      4.4   1/11/2017   ANEU      2.5   1/11/2017  PLT      275   1/11/2017     NA      140   12/28/2016                POTASSIUM      3.9   1/11/2017        MAG      2.0   1/11/2017         CR     1.36   12/28/2016                LUDWIG      8.7   12/28/2016             BILITOTAL      0.3   12/28/2016        ALBUMIN      3.0   12/28/2016                 ALT       14   12/28/2016        AST       16   12/28/2016  BP: WNL   Urine Protein: Negative  Results reviewed, labs MET treatment parameters, ok to proceed with treatment.      Post Infusion Assessment:  Patient tolerated infusion without incident.  Blood return noted pre and post infusion.  Site patent and intact, free from redness, edema or discomfort.  No evidence of extravasations. Access  discontinued per protocol.    Discharge Plan:   Prescription refills given for Ativan and Zoloft.  Copy of AVS reviewed with patient and/or family.  Patient will return 1/18 for next appointment.  Patient discharged in stable condition accompanied by: .  Departure Mode: Ambulatory.  Face to Face Time: 7 minutes    Roberta Low RN

## 2017-01-11 NOTE — PROGRESS NOTES
Palliative Care Clinical Social Work Return Appointment    PLEASE NOTE:  THIS IS A MENTAL HEALTH NOTE.  OTHER PROVIDERS VIEWING THIS NOTE SHOULD USE THIS ONLY FOR UNDERSTANDING THE CONTEXT OF THE PATIENT S EXPERIENCE.  TOPICS DESCRIBED IN THIS NOTE SHOULD NOT BE REFERENCED TO THE PATIENT BY MEDICAL PROVIDERS.    Etelvina is a 66 year old woman with metastatic ovarian cancer, seen today for a return appointment. She invited her  to sit in on the appointment. He seemed to be a supportive presence but said little today.    Mental Status Exam:(List all that apply)      Appearance: Appropriate      Eye Contact: Good       Orientation: Yes, x4      Mood: depressed, tired, anxious      Affect: Anxious, flat, slower than typical speech      Thought Content: Clear         Thought Form: Logical      Psychomotor Behavior: Slower movements than typical - using a cane to walk - short of breath - slight blueness of lips. (MD visit after this to assess further)    Visit summary:   Mental Health (Anxiety, depression, other symptoms):   Mood is markedly depressed today. I wonder if this may also be related to pulmonary function since she is more fatigued, more short of breath, and using cane today, a change from past visits with me. Anhedonia and disengagement from activities usually enjoyed/ found helpful for coping - less drawing, less talking with sister, less reading. Wishes she could get outside and walk since physical activity has been key for coping/ maintaining mental health. However, in cold temps it is hard to breathe outside. She worries this is laziness on her part and I provide normalization re: the impact of temperature, wind, humidity for those with compromised lung function.     Significant anxiety is also present with fears that she will be told there are no more chemo options/ being prevented from continuing chemo by oncology team. There is increased fear of being alone.  has been with her most of the  "time. He was gone a few hours yesterday and she was able to cope by sleeping/ snuggling with the dog. Several times recently she has started hyperventilating; it has been helpful to hold her 's hand during these episodes; she tends to take an oxycodone and then fall asleep holding his hand.    Focus of visit today was motivational interviewing, exploring and supporting use of existing coping skills and sources of deborah, exploration of helpful cognitions, and accessing positive memories and internal resources. Helpful thoughts: \"Maybe the chemo will help.\" and \"I have a pretty good life.\" Coping options: coloring; reading (but harder to focus recently - likes romances, westerns); once weather is warmer, getting outside; planning for being outdoors/ camping this summer if possible. Positive memories:     Depressed mood is also connected with anticipatory grief, and I offered reflection and psychoeducation focused on this. Also facilitated/ invited life review. Etelvina expressed re: her daughter Jewels being part of her life only till age 10: \"I guess it was God's plan, but it is heartbreaking.\" (Note: Jewels's birthday is Jan [birth year]). She remembers caring for Jewels as a baby with a lot of deborah. She shared stories of her marriage in 1984 -  - a pale lavender dress - going out drinking later with friends and falling. Mixed feelings about the day. Memories of times outdoors remain source of comfort and pleasure.    Relaxation training reviewed today.    Relaxation Training:  Relaxation training was discussed and explained and any questions were answered.  Preferences were determined.  Patient was then led in a relaxation training.    Method of increasing internal focus:  Close eyes, progressive muscle relaxation (warm wave)  Techniques used/taught:  Progressive muscle relaxation. Favorite place - [place] up [place] in woods.  Method of returning patient to usual awake/alert state:  Move toes, then fingers, " "then open eyes  Patient report following training session:  \"It was nice to be there. I feel a little better. I had a chair that I was sitting in, in the trees by the water.\"    Adjustment to Illness: Cough has worsened recently with increased use of oxycodone - she is tracking each time she takes the pills and shows me small book she uses for this. She will see Dr Peck today to review and assess plan. In the past the medication seemed to resolve the problems with coughing but these have returned. She finds she is needing more medication often in recent days. As noted above, there is significant fear about dying, and fear that the oncology team may think hospice is appropriate, while she continues to want more time alive if at all possible, including continued chemo. She tells me her cancer comes back rapidly when she is off chemo. She tells me her impression is that \"I probably don't have that long.\" Sadness and fear are prominent.    Relationships: Wanting  with her at all times if possible. He is retired/ disabled so able to do this. Otherwise, she describes very limited supports. Sees sister only occasionally. There is shame about clutter in the house; she used to do all housekeeping and was very organized but now too fatigued. We discuss possible referral to [employer] nav[facility] to see if there may be housekeeping or other volunteer visitor services available. She is receptive to the referral to find out. Sees 's children/ grandchildren very rarely. Dog, Carlin, is a source of deborah. Etelvina brightens in talking about his antics and affection.    End of Life and Advance Care Planning: As noted above, significant fear about dying. Voicing that she understands it may be coming soon. Some grief, anger, fear, sadness. Strong desire to live longer, to continue chemo as long as it is an option.    Internal Resources: See above - exploration of positive memories, especially time outdoors, by lakes, in woods. " "Mixed feelings about memories of caring for young daughter. Practice of relaxation exercise. Motivational interviewing re: benefits of drawing, reading, any \"mental vacation.\"    Main therapeutic interventions provided this session include:  Facilitating processing of feelings and thoughts, facilitating life review; re-framing; behavioral/ body mind skills training; motivational interviewing; psychoeducation    Plan:  Etelvina will return for psychotherapy with palliative care focus on Tuesday Jan 24 at [facility].    Time spent with patient/family:  50 minutes    Palliative Care Counseling Treatment Plan    Client's Name: Etelvina Jacobs  YOB: 1950    Date: 11/23/2016    Initial DSM5 Diagnoses:   296.32 Major Depressive Disorder, Recurrent Episode, Moderate With mixed features  300.01 (F41.0) Panic Disorder  300.02 (F41.1) Generalized Anxiety Disorder      Referral / Collaboration:  .Referral to another professional/service is not indicated at this time.      Anticipated number of sessions to complete episode of care:  10      Treatment Goal(s)  Date Goal Dates  Reviewed Status    Goal 1:  Client will Experience reduction in anxiety..      New    Objective #1A (Client Action)  Client will Identify triggers for anxiety/panic attacks, Identify early signs/symptoms of anxiety and Practice self awareness exercises.    Intervention(s)  Therapist will Provide psychoeducation and Facilitate structured problem solving .    New    Objective #1B  Client will Identify effective coping strategies.    Intervention(s)  Therapist will Provide psychoeducation, Provide behavioral intervention training and Facilitate structured problem solving. Provide motivational interviewing.    New    Objective #1C: Client will complete advance health care directive.    Interventions:  Therapist will facilitate advance care planning as well as supporting patient in facing anxiety related barriers to addressing this.             Date Goal " Dates  Reviewed Status    Goal 2:  Client will effectively address grief / loss and depressed mood.    New    Objective #2A (Client Action)  Client will Increase understanding of loss and grief and Develop strategies for coping with grief/loss.    Intervention(s) (Therapist Action)  Therapist will Provide psychoeducation, Facilitate processing of thoughts and feelings and Facilitate structured problem solving.    New    Objective #2B  Client will Identify and use personally meaningful ritual(s) and Participate in Life Legacy work and Participate in life review.    Intervention(s)  Therapist will Provide psychoeducation, Facilitate Life Legacy work and Facilitate processing of thoughts and feelings.    New       Client reviewed and agreed to this plan on Dec 14, 2016.    PARAM Jiang, LICSW      DO NOT SEND ANY LETTERS

## 2017-01-11 NOTE — MR AVS SNAPSHOT
After Visit Summary   1/11/2017    Etelvina Jacobs    MRN: [id number]           Patient Information     Date Of Birth          1950        Visit Information        Provider Department      1/11/2017 10:30 AM  22 ATC;  [facility]Y INFUSION [facility], located within [facility]        Today's Diagnoses     Ovarian cancer, left (H)    -  1     Encounter for long-term current use of medication           Care Instructions    Contact Numbers  [facility] Nurse Triage: [phone]  After Hours Nurse Line:  [phone]  Hospital Main Line:  [phone]    Please call the [facility] Triage line if you experience a temperature greater than or equal to 100.5, shaking chills, have uncontrolled nausea, vomiting and/or diarrhea, dizziness, shortness of breath, chest pain, bleeding, unexplained bruising, or if you have any other new/concerning symptoms, questions or concerns.     If it is after hours, weekends, or holidays, please call either the after hours nurse line listed above or the main hospital  at  [phone] and ask to speak to the Oncology doctor on call.     If you are having any concerning symptoms or wish to speak to a provider before your next infusion visit, please call your care coordinator or triage to notify them so we can adequately serve you.     If you need a refill on a narcotic prescription or other medication, please call triage before your infusion appointment.         January 2017 Sunday Monday Tuesday Wednesday Thursday Friday Saturday   1     2     3     4     [facility] LAB DRAW   10:00 AM   (15 min.)   [facility] LAB DRAW   [facility] Lab Draw     [facility] ONC INFUSION 120   10:30 AM   (120 min.)    ONCOLOGY INFUSION   [facility] 5     6     LAB    8:30 AM   (15 min.)   AN LAB   [facility] 7       8     9     10     LAB    1:00 PM   (15 min.)    LAB   [facility] Lab     [facility] RETURN WITH ROOM    2:00 PM   (60 min.)   [facility]     [facility] RETURN    3:00 PM   (30 min.)   Darlene Peck MD   [facility] 11     [facility] DRAW   10:15 AM   (15 min.)   [facility]     [facility] ONC INFUSION 180   10:30 AM   (180 min.)   [facility] ONCOLOGY INFUSION   [facility] 12     13     14       15     16     17     18     [facility] LAB DRAW   10:00 AM   (15 min.)    [facility] LAB DRAW   [facility] Lab Draw     [facility] ONC INFUSION 120   10:30 AM   (120 min.)   [facility] ONCOLOGY INFUSION   [facility] 19     20     21       22     23     24     25     [facility] LAB DRAW   10:00 AM   (15 min.)    [facility] LAB DRAW   [facility] Lab Draw     [facility] ONC INFUSION 180   10:30 AM   (180 min.)    ONCOLOGY INFUSION   [facility] 26     [facility] RETURN ACTIVE TREATMENT    2:20 PM   (20 min.)   Erica Markham MD   [facility] 27     28       29     30     31 February 2017 Sunday Monday Tuesday Wednesday Thursday Friday Saturday                  1     [facility] LAB DRAW   10:00 AM   (15 min.)    [facility] LAB DRAW   [facility] Lab Draw     [facility] ONC INFUSION 120   10:30 AM   (120 min.)    ONCOLOGY INFUSION   [facility] 2     3     4       5     6     7     [facility] RETURN    1:00 PM   (30 min.)   Darlene Peck MD   [facility] 8     9     10     11       12     13     14     15     [facility] LAB DRAW    7:30 AM   (15 min.)    [facility] LAB DRAW   [facility] Lab Draw     [facility] RETURN ACTIVE TREATMENT    8:00 AM   (40 min.)   Valentina Haney APRN CNP   [facility]     [facility] ONC INFUSION 180    9:00 AM   (180 min.)    ONCOLOGY INFUSION   [facility] 16     17     18       19     20     21     22     [facility] LAB DRAW    9:30 AM   (15 min.)   [facility]  LAB DRAW   [facility] Lab Draw     [facility] ONC INFUSION 120   10:00 AM   (120 min.)    ONCOLOGY INFUSION   [facility] 23     24     25       26     27     28                                      Lab Results:  Recent Results (from the past 12 hour(s))   CBC with platelets differential    Collection Time: 01/11/17 11:00 AM   Result Value Ref Range    WBC 4.4 4.0 - 11.0 10e9/L    RBC Count 3.67 (L) 3.8 - 5.2 10e12/L    Hemoglobin 9.1 (L) 11.7 - 15.7 g/dL    Hematocrit 30.0 (L) 35.0 - 47.0 %    MCV 82 78 - 100 fl    MCH 24.8 (L) 26.5 - 33.0 pg    MCHC 30.3 (L) 31.5 - 36.5 g/dL    RDW 26.7 (H) 10.0 - 15.0 %    Platelet Count 275 150 - 450 10e9/L    Diff Method Automated Method     % Neutrophils 57.5 %    % Lymphocytes 28.7 %    % Monocytes 9.6 %    % Eosinophils 3.2 %    % Basophils 0.5 %    % Immature Granulocytes 0.5 %    Nucleated RBCs 0 0 /100    Absolute Neutrophil 2.5 1.6 - 8.3 10e9/L    Absolute Lymphocytes 1.3 0.8 - 5.3 10e9/L    Absolute Monocytes 0.4 0.0 - 1.3 10e9/L    Absolute Eosinophils 0.1 0.0 - 0.7 10e9/L    Absolute Basophils 0.0 0.0 - 0.2 10e9/L    Abs Immature Granulocytes 0.0 0 - 0.4 10e9/L    Absolute Nucleated RBC 0.0    Magnesium    Collection Time: 01/11/17 11:00 AM   Result Value Ref Range    Magnesium 2.0 1.6 - 2.3 mg/dL   Potassium    Collection Time: 01/11/17 11:00 AM   Result Value Ref Range    Potassium 3.9 3.4 - 5.3 mmol/L   Protein qualitative urine    Collection Time: 01/11/17 11:10 AM   Result Value Ref Range    Protein Albumin Urine Negative NEG mg/dL               Follow-ups after your visit        Your next 10 appointments already scheduled     Jan 18, 2017 10:00 AM   [facility]   [facility])    [address] [address] [phone]            Jan 18, 2017 10:30 AM   Infusion 120 with [facility] ONCOLOGY INFUSION,  30 ATC   [facility] ([facility])    [address] [place] [address]   [phone]            Jan 25, 2017 10:00 AM   [facility] Lab Draw with [facility] LAB DRAW   [facility] Lab Draw ([facility])    [address] [place] [address]   [phone]            Jan 25, 2017 10:30 AM   Infusion 180 with UC ONCOLOGY INFUSION, UC 22 ATC   [facility] ([facility])    [address] [place] [address]   [phone]            Jan 26, 2017  2:20 PM   (Arrive by 2:05 PM)   Return Active Treatment with Erica Markham MD   [facility] ([facility])    [address] [place] [address]   [phone]            Feb 01, 2017 10:00 AM   [facility] Lab Draw with [facility] LAB DRAW   [facility] Lab Draw ([facility])    [address] [place] [address]   [phone]            Feb 01, 2017 10:30 AM   Infusion 120 with UC ONCOLOGY INFUSION, UC 30 ATC   [facility] ([facility])    [address] [place] [address]   [phone]              Who to contact     If you have questions or need follow up information about today's clinic visit or your schedule please contact [facility] directly at [phone].  Normal or non-critical lab and imaging results will be communicated to you by MyChart, letter or phone within 4 business days after the clinic has received the results. If you do not hear from us within 7 days, please contact the clinic through MyChart or phone. If you have a critical or abnormal lab result, we will notify you by phone as soon as possible.  Submit refill requests through [employer] or call your pharmacy and they will forward the refill request  to us. Please allow 3 business days for your refill to be completed.          Additional Information About Your Visit        MyChart Information     [employer] gives you secure access to your electronic health record. If you see a primary care provider, you can also send messages to your care team and make appointments. If you have questions, please call your primary care clinic.  If you do not have a primary care provider, please call [phone] and they will assist you.        Care EveryWhere ID     This is your Care EveryWhere ID. This could be used by other organizations to access your [facility] medical records  [id number]        Your Vitals Were     Pulse Temperature Respirations Pulse Oximetry          93 99.2  F (37.3  C) (Oral) 20 96%         Blood Pressure from Last 3 Encounters:   01/11/17 122/69   01/10/17 117/76   01/04/17 129/70    Weight from Last 3 Encounters:   01/11/17 79.969 kg (176 lb 4.8 oz)   01/10/17 79.606 kg (175 lb 8 oz)   01/04/17 81.149 kg (178 lb 14.4 oz)              We Performed the Following     CBC with platelets differential     Magnesium     Potassium     Protein qualitative urine        Primary Care Provider Office Phone # Fax #    [facility] [phone] [facility] [id number]        Thank you!     Thank you for choosing [facility]  for your care. Our goal is always to provide you with excellent care. Hearing back from our patients is one way we can continue to improve our services. Please take a few minutes to complete the written survey that you may receive in the mail after your visit with us. Thank you!             Your Updated Medication List - Protect others around you: Learn how to safely use, store and throw away your medicines at www.disposemymeds.org.          This list is accurate as of: 1/11/17 12:55 PM.  Always use your most recent med list.                   Brand Name Dispense Instructions for use     albuterol 108 (90 BASE) MCG/ACT Inhaler    albuterol    1 Inhaler    Inhale 2 puffs into the lungs every 6 hours       BENADRYL PO      Take 50 mg by mouth daily as needed       HYDROmorphone 2 MG tablet    DILAUDID    200 tablet    Take 1-2 tablets (2-4 mg) by mouth every 3 hours as needed (dyspnea and??/ or cough)       ipratropium - albuterol 0.5 mg/2.5 mg/3 mL 0.5-2.5 (3) MG/3ML neb solution    DUONEB    3 mL    Take 1 vial (3 mLs) by nebulization once for 1 dose In clinic neb       * LORazepam 1 MG tablet    ATIVAN    30 tablet    Take 1 tablet (1 mg) by mouth every 6 hours as needed (Anxiety, Nausea/Vomiting or Sleep)       * LORazepam 0.5 MG tablet    ATIVAN    30 tablet    Take 1 tablet (0.5 mg) by mouth every 6 hours as needed (Anxiety, Nausea/Vomiting or Sleep)       omeprazole 20 MG CR capsule    priLOSEC    90 capsule    Take 2 capsules (40 mg) by mouth daily       ondansetron 8 MG tablet    ZOFRAN    30 tablet    Take 1 tablet (8 mg) by mouth every 8 hours as needed for nausea       * order for DME     1 Device    Equipment being ordered: Oxygen 1-2 L NC Keep O2 sats > 90%. RA sats 88%       * order for DME     1 Device    Equipment being ordered: Oxygen 3 liters of oxygen continuous to keep sats >90%. RA sats 88%.       oxyCODONE 10 MG 12 hr tablet    OXYCONTIN    60 tablet    Take one tablet around 8am and 8pm.       * prochlorperazine 10 MG tablet    COMPAZINE    30 tablet    Take 1 tablet (10 mg) by mouth every 6 hours as needed (nausea/vomiting)       * prochlorperazine 5 MG tablet    COMPAZINE    30 tablet    Take 1 tablet (5 mg) by mouth every 6 hours as needed (Nausea/Vomiting)       senna 8.6 MG tablet    SENOKOT    120 tablet    Take 1-2 tablets by mouth 2 times daily as needed for constipation       sertraline 50 MG tablet    ZOLOFT    30 tablet    Take 1 tablet (50 mg) by mouth daily       sodium chloride 0.65 % nasal spray    OCEAN    88 mL    Spray 1 spray into both nostrils every 2  hours as needed for congestion       umeclidinium-vilanterol 62.5-25 MCG/INH oral inhaler    ANORO ELLIPTA    1 Inhaler    Inhale 1 puff into the lungs daily as needed Alternate with duoneb q6h Therapeutic interchange for Combivent Inhaler.       warfarin 5 MG tablet    COUMADIN    72 tablet    Take 2.5 mg on Tues, Thurs, Sun and 5 mg Mon, Wed, Fri, Sat or as directed by anticoagulation clinic.       * Notice:  This list has 6 medication(s) that are the same as other medications prescribed for you. Read the directions carefully, and ask your doctor or other care provider to review them with you.

## 2017-01-13 NOTE — TELEPHONE ENCOUNTER
----- Message from Darlene Peck MD sent at 1/11/2017  9:15 PM CST -----  Hi. If you speak with Etelvina and her cough is not any better, I would like to go ahead with the steroids, 4mg qAM x 7 days then discontinue. Thanks so much.   ----- Message -----     From: Erica Markham MD     Sent: 1/11/2017   1:50 PM       To: Darlene Peck MD, #    Thanks Darlene. I think it is fine to add steroids for her. Thanks for seeing her.   Let me know if you need anything.  Erica  ----- Message -----     From: Darlene Peck MD     Sent: 1/10/2017   4:04 PM       To: Erica Markham MD, #    Hi Erica and Valentina,    I wanted to let you know that I saw Etelvina in clinic today and she states that for the past week her cough has been worse and she is back to using her oxygen all the time. Her lungs were clear without wheezing so I did not add steroids but will follow up with her at the end of the week and if cough persists, add steroids unless you would rather I not. I did add oxycontin as I think she will feel much better and less cough with more opioids on board and she isn't willing to take more hydromorphone. Unfortunately I think it will take maybe a week to get prior authorization and due to her renal fxn, I did not want to start MS Triny.     Thanks,   Darlene Peck  Palliative Care Physician  Pager [phone]

## 2017-01-13 NOTE — PROGRESS NOTES
ANTICOAGULATION FOLLOW-UP CLINIC VISIT    Patient Name:  Etelvina Jacobs  Date:  1/13/2017  Contact Type:  Telephone    SUBJECTIVE:     Patient Findings     Positives OTC meds    Comments Pt wants to restart susie root tablet today. Pt is getting chemo treatment            OBJECTIVE    INR   Date Value Ref Range Status   01/13/2017 3.20* 0.86 - 1.14 Final     Comment:     This test is intended for monitoring Coumadin therapy.  Results are not   accurate   in patients with prolonged INR due to factor deficiency.         ASSESSMENT / PLAN  INR assessment SUPRA    Recheck INR In: 5 DAYS    INR Location Clinic      Anticoagulation Summary as of 1/13/2017     INR goal 2.0-3.0   Selected INR 3.20! (1/13/2017)   Maintenance plan No maintenance plan   Full instructions 1/13: 1.25 mg; 1/14: 1.25 mg; 1/15: 2.5 mg; 1/16: 2.5 mg; 1/17: 1.25 mg   Plan last modified Shari Cancino, RN (1/13/2017)   Next INR check 1/18/2017   Priority INR   Target end date     Indications   Long-term (current) use of anticoagulants [Z79.01] [Z79.01]  Deep vein thrombosis (DVT) (H) [I82.409] [I82.409]         Anticoagulation Episode Summary     INR check location     Preferred lab     Send INR reminders to [facility]    Comments Has 5mg and 2.5mg tablets       Anticoagulation Care Providers     Provider Role Specialty Phone number    Alberta Deal MD [facility] Internal Medicine [phone]            See the Encounter Report to view Anticoagulation Flowsheet and Dosing Calendar (Go to Encounters tab in chart review, and find the Anticoagulation Therapy Visit)    Spoke with Etelvina and sending new prescription for Warfarin 2.5mg tablets to Graeme Cancino, ODESSA

## 2017-01-13 NOTE — TELEPHONE ENCOUNTER
Attempted to call patient and home and cell # to check in regarding cough - but was unable to reach her. Left VMs at both phone #s.

## 2017-01-18 NOTE — PROGRESS NOTES
Infusion Nursing Note:  Etelvina Jacobs presents today for C2 D22 Taxol.    Patient seen by provider today: No    Treatment Conditions:  HGB      9.7   1/18/2017  WBC      4.4   1/18/2017   ANEU      2.7   1/18/2017  PLT      323   1/18/2017     NA      140   12/28/2016                POTASSIUM      4.2   1/18/2017        MAG      2.1   1/18/2017         CR     1.36   12/28/2016                LUDWIG      8.7   12/28/2016             BILITOTAL      0.3   12/28/2016        ALBUMIN      3.0   12/28/2016                 ALT       14   12/28/2016        AST       16   12/28/2016    Intravenous Access:  Implanted Port.  Access dc'd at time of discharge.      Note:   Results reviewed, copy given to patient.  Proceed with treatment.    Copy of AVS given to patient. + Blood return from PORT pre and post infusion.  Tolerated infusion without incident. No Prescriptions filled today.   D/C in care of spouse.  Pt will return 1/25 for next appointment.       Paulette Galdamez RN  Administrations This Visit     0.9% sodium chloride BOLUS     Admin Date Action Dose Route Administered By             01/18/2017 New Bag 250 mL Intravenous Paulette Galdamez RN                    diphenhydrAMINE (BENADRYL) capsule 25 mg     Admin Date Action Dose Route Administered By             01/18/2017 Given 25 mg Oral Paulette Galdamez RN                    famotidine (PEPCID) 40 mg in NaCl 0.9 % intermittent infusion     Admin Date Action Dose Route Administered By             01/18/2017 New Bag 40 mg Intravenous Roberta Thomas RN                    heparin 100 UNIT/ML injection 5 mL     Admin Date Action Dose Route Administered By             01/18/2017 Given 5 mL Intracatheter Lillian Quijano RN                    heparin 100 UNIT/ML injection 500 Units     Admin Date Action Dose Route Administered By             01/18/2017 Given 500 Units Intracatheter Paulette Galdamez RN                    ondansetron (ZOFRAN) 8 mg, dexamethasone  (DECADRON) 12 mg in NaCl 0.9 % 50 mL intermittent infusion     Admin Date Action Dose Route Administered By             01/18/2017 New Bag   Intravenous Paulette Galdamez RN                    PACLitaxel (TAXOL) 156 mg in NaCl 0.9 % 301 mL CHEMOTHERAPY     Admin Date Action Dose Route Administered By             01/18/2017 New Bag 156 mg Intravenous Paulette Galdamez RN                    sodium chloride (PF) 0.9% PF flush 10 mL     Admin Date Action Dose Route Administered By             01/18/2017 Given 10 mL Intracatheter Paulette Galdamez RN

## 2017-01-18 NOTE — PROGRESS NOTES
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

## 2017-01-18 NOTE — PROGRESS NOTES
ANTICOAGULATION FOLLOW-UP CLINIC VISIT    Patient Name:  Etelvina Jacobs  Date:  1/18/2017  Contact Type:  Telephone    SUBJECTIVE:     Patient Findings     Positives No Problem Findings           OBJECTIVE    INR   Date Value Ref Range Status   01/18/2017 1.69* 0.86 - 1.14 Final       ASSESSMENT / PLAN  INR assessment SUB    Recheck INR In: 1 WEEK    INR Location Clinic      Anticoagulation Summary as of 1/18/2017     INR goal 2.0-3.0   Selected INR 1.69! (1/18/2017)   Maintenance plan No maintenance plan   Full instructions 1/18: 2.5 mg; 1/19: 2.5 mg; 1/20: 1.25 mg; 1/21: 2.5 mg; 1/22: 2.5 mg; 1/23: 1.25 mg; 1/24: 2.5 mg   Plan last modified Shari Cancino RN (1/13/2017)   Next INR check 1/25/2017   Priority INR   Target end date     Indications   Long-term (current) use of anticoagulants [Z79.01] [Z79.01]  Deep vein thrombosis (DVT) (H) [I82.409] [I82.409]         Anticoagulation Episode Summary     INR check location     Preferred lab     Send INR reminders to [facility]    Comments Has 5mg and 2.5mg tablets       Anticoagulation Care Providers     Provider Role Specialty Phone number    Alberta Deal MD [facility] Internal Medicine [phone]            See the Encounter Report to view Anticoagulation Flowsheet and Dosing Calendar (Go to Encounters tab in chart review, and find the Anticoagulation Therapy Visit)    Left message with results and dosing recommendations. Asked patient to call back to report any missed doses, falls, signs and symptoms of bleeding or clotting, or any changes to health or diet.     Keiko Shoemaker, ODESSA

## 2017-01-18 NOTE — NURSING NOTE
Labs drawn via port a cath.  Flushed with saline and heparin. Covered with transparent dressing.  VS done.  Pt tolerated well.    Lillian Quijano  RN

## 2017-01-18 NOTE — MR AVS SNAPSHOT
After Visit Summary   1/18/2017    Etelvina Jacobs    MRN: [id number]           Patient Information     Date Of Birth          1950        Visit Information        Provider Department      1/18/2017 10:30 AM  30 ATC;  ONCOLOGY INFUSION [facility]        Today's Diagnoses     Ovarian cancer, left (H)    -  1     Encounter for long-term current use of medication         Deep vein thrombosis (DVT) (H)           Care Instructions    Contact Numbers  [facility]: [phone]    After Hours:  [phone]  Triage: [phone]    Please call the [facility] Triage line if you experience a temperature greater than or equal to 100.5, shaking chills, have uncontrolled nausea, vomiting and/or diarrhea, dizziness, shortness of breath, chest pain, bleeding, unexplained bruising, or if you have any other new/concerning symptoms, questions or concerns.     If it is after hours, weekends, or holidays, please call the main hospital  at  [phone] and ask to speak to the Oncology doctor on call.     If you are having any concerning symptoms or wish to speak to a provider before your next infusion visit, please call your care coordinator or triage to notify them so we can adequately serve you.     If you need a refill on a narcotic prescription or other medication, please call triage before your infusion appointment.         January 2017 Sunday Monday Tuesday Wednesday Thursday Friday Saturday   1     2     3     4     [facility] LAB DRAW   10:00 AM   (15 min.)   [facility] LAB DRAW   [facility] Lab Draw     [facility] ONC INFUSION 120   10:30 AM   (120 min.)    ONCOLOGY INFUSION   [facility] 5     6     LAB    8:30 AM   (15 min.)   AN LAB   [facility] 7       8     9     10     LAB    1:00 PM   (15 min.)    LAB   [facility] Lab     [facility] RETURN WITH ROOM    2:00 PM   (60 min.)   Vicki Chris LICSW   [facility]  [facility]     [facility] RETURN    3:00 PM   (30 min.)   Darlene Peck MD   [facility] 11     [facility] DRAW   10:15 AM   (15 min.)   [facility]   [facility] Draw     [facility] ONC INFUSION 180   10:30 AM   (180 min.)   [facility] ONCOLOGY INFUSION   [facility] 12     13     LAB    8:45 AM   (15 min.)   AN LAB   [facility] 14       15     16     17     18     [facility] LAB DRAW   10:00 AM   (15 min.)   [facility] DRAW   [facility] Lab Draw     [facility] ONC INFUSION 120   10:30 AM   (120 min.)   [facility] ONCOLOGY INFUSION   [facility] 19     20     21       22     23     24     25     [facility] LAB DRAW   10:00 AM   (15 min.)    [facility] LAB DRAW   [facility] Lab Draw     [facility] ONC INFUSION 180   10:30 AM   (180 min.)   [facility] ONCOLOGY INFUSION   [facility] 26     [facility] RETURN ACTIVE TREATMENT    2:20 PM   (20 min.)   Erica Markham MD   [facility] 27     28       29     30     31 February 2017 Sunday Monday Tuesday Wednesday Thursday Friday Saturday                  1     [facility] DRAW   10:00 AM   (15 min.)   [facility] DRAW   [facility] Lab Draw     [facility] ONC INFUSION 120   10:30 AM   (120 min.)   [facility] ONCOLOGY INFUSION   [facility] 2     3     4       5     6     7     [facility] RETURN    1:00 PM   (30 min.)   Darlene Peck MD   [facility] 8     9     10     11       12     13     14     15     [facility] DRAW    7:30 AM   (15 min.)   [facility] DRAW   [facility] Lab Draw     [facility] RETURN ACTIVE TREATMENT    8:00 AM   (40 min.)   Valentina Haney APRN CNP   [facility]     [facility] ONC INFUSION 180    9:00 AM   (180 min.)   [facility] ONCOLOGY INFUSION   [facility] 16     17     18       19     20     21     22     [facility] DRAW    9:30 AM   (15  min.)    [facility] LAB DRAW   [facility]     [facility] ONC INFUSION 120   10:00 AM   (120 min.)    ONCOLOGY INFUSION   [facility] 23     24     25       26     27     28                                     Recent Results (from the past 24 hour(s))   CBC with platelets differential    Collection Time: 01/18/17 10:01 AM   Result Value Ref Range    WBC 4.4 4.0 - 11.0 10e9/L    RBC Count 3.84 3.8 - 5.2 10e12/L    Hemoglobin 9.7 (L) 11.7 - 15.7 g/dL    Hematocrit 32.2 (L) 35.0 - 47.0 %    MCV 84 78 - 100 fl    MCH 25.3 (L) 26.5 - 33.0 pg    MCHC 30.1 (L) 31.5 - 36.5 g/dL    RDW 27.0 (H) 10.0 - 15.0 %    Platelet Count 323 150 - 450 10e9/L    Diff Method Automated Method     % Neutrophils 60.7 %    % Lymphocytes 25.6 %    % Monocytes 10.7 %    % Eosinophils 2.3 %    % Basophils 0.5 %    % Immature Granulocytes 0.2 %    Nucleated RBCs 0 0 /100    Absolute Neutrophil 2.7 1.6 - 8.3 10e9/L    Absolute Lymphocytes 1.1 0.8 - 5.3 10e9/L    Absolute Monocytes 0.5 0.0 - 1.3 10e9/L    Absolute Eosinophils 0.1 0.0 - 0.7 10e9/L    Absolute Basophils 0.0 0.0 - 0.2 10e9/L    Abs Immature Granulocytes 0.0 0 - 0.4 10e9/L    Absolute Nucleated RBC 0.0    Magnesium    Collection Time: 01/18/17 10:01 AM   Result Value Ref Range    Magnesium 2.1 1.6 - 2.3 mg/dL   Potassium    Collection Time: 01/18/17 10:01 AM   Result Value Ref Range    Potassium 4.2 3.4 - 5.3 mmol/L   INR    Collection Time: 01/18/17 10:01 AM   Result Value Ref Range    INR 1.69 (H) 0.86 - 1.14                 Follow-ups after your visit        Your next 10 appointments already scheduled     Jan 25, 2017 10:00 AM   [facility]  [employer] [facility]   [facility] Draw ([facility] [place])    [address] [address]   [phone]            Jan 25, 2017 10:30 AM   Infusion 180 with [facility] ONCOLOGY INFUSION, [facility]  [place])    [address] [place] [address]   [phone]            Jan 26, 2017  2:20 PM   (Arrive by 2:05 PM)   Return Active Treatment with Erica Markham MD   [facility] ([facility])    [address] [place] [address]   [phone]            Feb 01, 2017 10:00 AM   [facility] Lab Draw with [facility] DRAW   [facility] Lab Draw ([facility])    [address] [place] [address]   [phone]            Feb 01, 2017 10:30 AM   Infusion 120 with [facility] ONCOLOGY INFUSION, UC 30 ATC   [facility] ([facility])    [address] [place] [address]   [phone]            Feb 07, 2017  1:00 PM   (Arrive by 12:45 PM)   Return Visit with Darlene Peck MD   [facility] ([facility])    [address] [place] [address]   [phone]            Feb 15, 2017  7:30 AM   [facility] Lab Draw with [facility] DRAW   [facility]    [address] [place] [address]   [phone]            Feb 15, 2017  8:00 AM   (Arrive by 7:45 AM)   Return Active Treatment with LEANDRA Billy CNP   [facility] ([facility])    [address] [place] [address]   [phone]              Who to contact     If you have questions or need follow up information about today's clinic visit or your schedule please contact [facility] directly at [phone].  Normal or non-critical lab and imaging results will be communicated to you by MyChart, letter or phone within 4 business days after the clinic has received the results. If you do not hear from us within 7 days, please  contact the clinic through [employer] or phone. If you have a critical or abnormal lab result, we will notify you by phone as soon as possible.  Submit refill requests through [employer] or call your pharmacy and they will forward the refill request to us. Please allow 3 business days for your refill to be completed.          Additional Information About Your Visit        [employer] Information     [employer] gives you secure access to your electronic health record. If you see a primary care provider, you can also send messages to your care team and make appointments. If you have questions, please call your primary care clinic.  If you do not have a primary care provider, please call [phone] and they will assist you.        Care EveryWhere ID     This is your Care EveryWhere ID. This could be used by other organizations to access your [facility] medical records  [id number]        Your Vitals Were     Pulse Temperature Respirations Pulse Oximetry          108 98.3  F (36.8  C) (Oral) 20 94%         Blood Pressure from Last 3 Encounters:   01/18/17 143/89   01/11/17 122/69   01/10/17 117/76    Weight from Last 3 Encounters:   01/18/17 79.1 kg (174 lb 6.1 oz)   01/11/17 79.969 kg (176 lb 4.8 oz)   01/10/17 79.606 kg (175 lb 8 oz)              We Performed the Following     CBC with platelets differential     INR     Magnesium     Potassium     Treatment Conditions        Primary Care Provider Office Phone # Fax #    [facility] [phone] [address] [facility] [address]        Thank you!     Thank you for choosing [facility]  for your care. Our goal is always to provide you with excellent care. Hearing back from our patients is one way we can continue to improve our services. Please take a few minutes to complete the written survey that you may receive in the mail after your visit with us. Thank you!             Your Updated Medication List - Protect others around  you: Learn how to safely use, store and throw away your medicines at www.disposemymeds.org.          This list is accurate as of: 1/18/17 12:11 PM.  Always use your most recent med list.                   Brand Name Dispense Instructions for use    albuterol 108 (90 BASE) MCG/ACT Inhaler    albuterol    1 Inhaler    Inhale 2 puffs into the lungs every 6 hours       BENADRYL PO      Take 50 mg by mouth daily as needed       HYDROmorphone 2 MG tablet    DILAUDID    200 tablet    Take 1-2 tablets (2-4 mg) by mouth every 3 hours as needed (dyspnea and??/ or cough)       ipratropium - albuterol 0.5 mg/2.5 mg/3 mL 0.5-2.5 (3) MG/3ML neb solution    DUONEB    3 mL    Take 1 vial (3 mLs) by nebulization once for 1 dose In clinic neb       * LORazepam 0.5 MG tablet    ATIVAN    30 tablet    Take 1 tablet (0.5 mg) by mouth every 6 hours as needed (Anxiety, Nausea/Vomiting or Sleep)       * LORazepam 1 MG tablet    ATIVAN    30 tablet    Take 1 tablet (1 mg) by mouth every 6 hours as needed (Anxiety, Nausea/Vomiting or Sleep)       omeprazole 20 MG CR capsule    priLOSEC    90 capsule    Take 2 capsules (40 mg) by mouth daily       ondansetron 8 MG tablet    ZOFRAN    30 tablet    Take 1 tablet (8 mg) by mouth every 8 hours as needed for nausea       * order for DME     1 Device    Equipment being ordered: Oxygen 1-2 L NC Keep O2 sats > 90%. RA sats 88%       * order for DME     1 Device    Equipment being ordered: Oxygen 3 liters of oxygen continuous to keep sats >90%. RA sats 88%.       oxyCODONE 10 MG 12 hr tablet    OXYCONTIN    60 tablet    Take one tablet around 8am and 8pm.       * prochlorperazine 10 MG tablet    COMPAZINE    30 tablet    Take 1 tablet (10 mg) by mouth every 6 hours as needed (nausea/vomiting)       * prochlorperazine 5 MG tablet    COMPAZINE    30 tablet    Take 1 tablet (5 mg) by mouth every 6 hours as needed (Nausea/Vomiting)       senna 8.6 MG tablet    SENOKOT    120 tablet    Take 1-2 tablets by  mouth 2 times daily as needed for constipation       sertraline 50 MG tablet    ZOLOFT    30 tablet    Take 1 tablet (50 mg) by mouth daily       sodium chloride 0.65 % nasal spray    OCEAN    88 mL    Spray 1 spray into both nostrils every 2 hours as needed for congestion       umeclidinium-vilanterol 62.5-25 MCG/INH oral inhaler    ANORO ELLIPTA    1 Inhaler    Inhale 1 puff into the lungs daily as needed Alternate with duoneb q6h Therapeutic interchange for Combivent Inhaler.       * warfarin 5 MG tablet    COUMADIN    72 tablet    Take 2.5 mg on Tues, Thurs, Sun and 5 mg Mon, Wed, Fri, Sat or as directed by anticoagulation clinic.       * warfarin 2.5 MG tablet    COUMADIN    60 tablet    Take one to two tablets daily or as directed by the Coumadin clinic       * Notice:  This list has 8 medication(s) that are the same as other medications prescribed for you. Read the directions carefully, and ask your doctor or other care provider to review them with you.

## 2017-01-25 NOTE — MR AVS SNAPSHOT
After Visit Summary   1/25/2017    Etelvina Jacobs    MRN: [id number]           Patient Information     Date Of Birth          1950        Visit Information        Provider Department      1/25/2017 10:30 AM  22 ATC;  ONCOLOGY INFUSION [facility]        Today's Diagnoses     Ovarian cancer, left (H)    -  1     Encounter for long-term current use of medication           Care Instructions    Contact Numbers    [facility] Main Line: [phone]  [facility] Triage:  [phone]    Call triage with chills and/or temperature greater than or equal to 100.5, uncontrolled nausea/vomiting, diarrhea, constipation, dizziness, shortness of breath, chest pain, bleeding, unexplained bruising, or any new/concerning symptoms, questions/concerns.     If you are having any concerning symptoms or wish to speak to a provider before your next infusion visit, please call your care coordinator or triage to notify them so we can adequately serve you.       After Hours: [phone]    If after hours, weekends, or holidays, call main hospital  and ask for Oncology doctor on call.           January 2017 Sunday Monday Tuesday Wednesday Thursday Friday Saturday   1     2     3     4     [facility] LAB DRAW   10:00 AM   (15 min.)   [facility] LAB DRAW   [facility] Lab Draw     UMP ONC INFUSION 120   10:30 AM   (120 min.)    ONCOLOGY INFUSION   [facility] 5     6     LAB    8:30 AM   (15 min.)   AN LAB   [facility] 7       8     9     10     LAB    1:00 PM   (15 min.)    LAB   [facility] Lab     P RETURN WITH ROOM    2:00 PM   (60 min.)   Vicki Chris LICSW   [facility]     UMP RETURN    3:00 PM   (30 min.)   Darlene Peck MD   [facility] 11     [facility] LAB DRAW   10:15 AM   (15 min.)    [facility] LAB DRAW   [facility] Lab Draw     UMP ONC INFUSION 180   10:30 AM   (180 min.)    ONCOLOGY  INFUSION   [facility] 12     13     LAB    8:45 AM   (15 min.)   AN LAB   [facility] 14       15     16     17     18     [facility] LAB DRAW   10:00 AM   (15 min.)    [facility]   [facility]     [facility] ONC INFUSION 120   10:30 AM   (120 min.)   UC ONCOLOGY INFUSION   [facility] 19     20     21       22     23     24     25     [facility] LAB DRAW   10:00 AM   (15 min.)    [facility]RAW   [facility]     [facility] ONC INFUSION 180   10:30 AM   (180 min.)    ONCOLOGY INFUSION   [facility] 26     UMP RETURN ACTIVE TREATMENT    2:20 PM   (20 min.)   Erica Markham MD   [facility] 27     28       29     30     31 February 2017 Sunday Monday Tuesday Wednesday Thursday Friday Saturday                  1     [facility] LAB DRAW   10:00 AM   (15 min.)    [facility]   [facility]     [facility] ONC INFUSION 120   10:30 AM   (120 min.)    ONCOLOGY INFUSION   [facility] 2     3     4       5     6     7     UMP RETURN    1:00 PM   (30 min.)   Darlene Peck MD   [facility] 8     9     10     11       12     13     14     15     [facility] LAB DRAW    7:30 AM   (15 min.)    [facility] LAB DRAW   [facility] Lab Draw     UMP RETURN ACTIVE TREATMENT    8:00 AM   (40 min.)   Valentina Haney APRN CNP   [facility]     UMP ONC INFUSION 180    9:00 AM   (180 min.)   UC ONCOLOGY INFUSION   [facility] 16     17     18       19     20     21     22     [facility] LAB DRAW    9:30 AM   (15 min.)    [facility] LAB DRAW   [facility] Lab Draw     UMP ONC INFUSION 120   10:00 AM   (120 min.)    ONCOLOGY INFUSION   [facility] 23     24     25       26     27     28                                      Lab Results:  Recent Results  (from the past 12 hour(s))   CBC with platelets differential    Collection Time: 01/25/17 10:25 AM   Result Value Ref Range    WBC 4.2 4.0 - 11.0 10e9/L    RBC Count 3.59 (L) 3.8 - 5.2 10e12/L    Hemoglobin 9.2 (L) 11.7 - 15.7 g/dL    Hematocrit 30.4 (L) 35.0 - 47.0 %    MCV 85 78 - 100 fl    MCH 25.6 (L) 26.5 - 33.0 pg    MCHC 30.3 (L) 31.5 - 36.5 g/dL    RDW 27.0 (H) 10.0 - 15.0 %    Platelet Count 270 150 - 450 10e9/L    Diff Method Automated Method     % Neutrophils 58.7 %    % Lymphocytes 26.7 %    % Monocytes 10.6 %    % Eosinophils 2.6 %    % Basophils 0.7 %    % Immature Granulocytes 0.7 %    Nucleated RBCs 0 0 /100    Absolute Neutrophil 2.4 1.6 - 8.3 10e9/L    Absolute Lymphocytes 1.1 0.8 - 5.3 10e9/L    Absolute Monocytes 0.4 0.0 - 1.3 10e9/L    Absolute Eosinophils 0.1 0.0 - 0.7 10e9/L    Absolute Basophils 0.0 0.0 - 0.2 10e9/L    Abs Immature Granulocytes 0.0 0 - 0.4 10e9/L    Absolute Nucleated RBC 0.0    Magnesium    Collection Time: 01/25/17 10:25 AM   Result Value Ref Range    Magnesium 1.8 1.6 - 2.3 mg/dL   Potassium    Collection Time: 01/25/17 10:25 AM   Result Value Ref Range    Potassium 3.9 3.4 - 5.3 mmol/L   Protein qualitative urine    Collection Time: 01/25/17 11:00 AM   Result Value Ref Range    Protein Albumin Urine Negative NEG mg/dL               Follow-ups after your visit        Your next 10 appointments already scheduled     Jan 26, 2017  2:20 PM   (Arrive by 2:05 PM)   Return Active Treatment with Erica Markham MD   [facility] ([facility])    [address] [place] [id number] [phone]            Feb 01, 2017 10:00 AM   [facility] Lab Draw with  [facility] LAB DRAW   [facility] Lab Draw ([facility])    [address] [facility]  [address] [facility] [address] [phone]            Feb 01, 2017 10:30 AM   Infusion 120 with UC ONCOLOGY INFUSION, UC 30 ATC   [facility] ([facility])    [address] [place] [id number]   [phone]            Feb 07, 2017  1:00 PM   (Arrive by 12:45 PM)   Return Visit with Darlene Peck MD   [facility] ([facility])    [address] [place] [id number]   [phone]            Feb 15, 2017  7:30 AM   [facility] Lab Draw with  [facility] LAB DRAW   [facility] Lab Draw ([facility])    [address] [place] [address]   [phone]            Feb 15, 2017  8:00 AM   (Arrive by 7:45 AM)   Return Active Treatment with LEANDRA Billy CNP   [facility] ([facility])    [address] [place] [address]   [phone]            Feb 15, 2017  9:00 AM   Infusion 180 with UC ONCOLOGY INFUSION, UC 24 ATC   [facility] ([facility])    [address] [place] [address]   [phone]            Feb 22, 2017  9:30 AM   [facility] Lab Draw with UC [facility] LAB DRAW   [facility] Lab Draw ([facility])    [address] [place] [address]   [phone]              Who to contact     If you have questions or need follow up information about today's clinic visit or your schedule please contact [facility] directly at [phone].  Normal or non-critical lab and imaging results will be communicated to you by MyChart, letter or phone within 4 business days after the clinic has received the results. If you do not hear from us within 7 days, please contact the clinic through MyChart or phone. If you have a critical or abnormal lab result, we will notify you by phone as soon as possible.  Submit refill requests through [employer] or call your pharmacy and they will  forward the refill request to us. Please allow 3 business days for your refill to be completed.          Additional Information About Your Visit        [employer] Information     [employer] gives you secure access to your electronic health record. If you see a primary care provider, you can also send messages to your care team and make appointments. If you have questions, please call your primary care clinic.  If you do not have a primary care provider, please call [phone] and they will assist you.        Care EveryWhere ID     This is your Care EveryWhere ID. This could be used by other organizations to access your [place] medical records  [id number]        Your Vitals Were     Pulse Temperature Respirations Pulse Oximetry          116 98.3  F (36.8  C) (Oral) 20 95%         Blood Pressure from Last 3 Encounters:   01/25/17 124/80   01/18/17 143/89   01/11/17 122/69    Weight from Last 3 Encounters:   01/25/17 79.243 kg (174 lb 11.2 oz)   01/18/17 79.1 kg (174 lb 6.1 oz)   01/11/17 79.969 kg (176 lb 4.8 oz)              We Performed the Following     CBC with platelets differential     Magnesium     Potassium     Protein qualitative urine     Treatment Conditions        Primary Care Provider Office Phone # Fax #    [facility] [phone] [id number] [facility] [id number]        Thank you!     Thank you for choosing [facility]  for your care. Our goal is always to provide you with excellent care. Hearing back from our patients is one way we can continue to improve our services. Please take a few minutes to complete the written survey that you may receive in the mail after your visit with us. Thank you!             Your Updated Medication List - Protect others around you: Learn how to safely use, store and throw away your medicines at www.disposemymeds.org.          This list is accurate as of: 1/25/17 11:29 AM.  Always use your most recent med list.                    Brand Name Dispense Instructions for use    albuterol 108 (90 BASE) MCG/ACT Inhaler    albuterol    1 Inhaler    Inhale 2 puffs into the lungs every 6 hours       BENADRYL PO      Take 50 mg by mouth daily as needed       HYDROmorphone 2 MG tablet    DILAUDID    200 tablet    Take 1-2 tablets (2-4 mg) by mouth every 3 hours as needed (dyspnea and??/ or cough)       ipratropium - albuterol 0.5 mg/2.5 mg/3 mL 0.5-2.5 (3) MG/3ML neb solution    DUONEB    3 mL    Take 1 vial (3 mLs) by nebulization once for 1 dose In clinic neb       * LORazepam 0.5 MG tablet    ATIVAN    30 tablet    Take 1 tablet (0.5 mg) by mouth every 6 hours as needed (Anxiety, Nausea/Vomiting or Sleep)       * LORazepam 1 MG tablet    ATIVAN    30 tablet    Take 1 tablet (1 mg) by mouth every 6 hours as needed (Anxiety, Nausea/Vomiting or Sleep)       omeprazole 20 MG CR capsule    priLOSEC    90 capsule    Take 2 capsules (40 mg) by mouth daily       ondansetron 8 MG tablet    ZOFRAN    30 tablet    Take 1 tablet (8 mg) by mouth every 8 hours as needed for nausea       * order for DME     1 Device    Equipment being ordered: Oxygen 1-2 L NC Keep O2 sats > 90%. RA sats 88%       * order for DME     1 Device    Equipment being ordered: Oxygen 3 liters of oxygen continuous to keep sats >90%. RA sats 88%.       oxyCODONE 10 MG 12 hr tablet    OXYCONTIN    60 tablet    Take one tablet around 8am and 8pm.       * prochlorperazine 10 MG tablet    COMPAZINE    30 tablet    Take 1 tablet (10 mg) by mouth every 6 hours as needed (nausea/vomiting)       * prochlorperazine 5 MG tablet    COMPAZINE    30 tablet    Take 1 tablet (5 mg) by mouth every 6 hours as needed (Nausea/Vomiting)       senna 8.6 MG tablet    SENOKOT    120 tablet    Take 1-2 tablets by mouth 2 times daily as needed for constipation       sertraline 50 MG tablet    ZOLOFT    30 tablet    Take 1 tablet (50 mg) by mouth daily       sodium chloride 0.65 % nasal spray    OCEAN     88 mL    Spray 1 spray into both nostrils every 2 hours as needed for congestion       umeclidinium-vilanterol 62.5-25 MCG/INH oral inhaler    ANORO ELLIPTA    1 Inhaler    Inhale 1 puff into the lungs daily as needed Alternate with duoneb q6h Therapeutic interchange for Combivent Inhaler.       * warfarin 5 MG tablet    COUMADIN    72 tablet    Take 2.5 mg on Tues, Thurs, Sun and 5 mg Mon, Wed, Fri, Sat or as directed by anticoagulation clinic.       * warfarin 2.5 MG tablet    COUMADIN    60 tablet    Take one to two tablets daily or as directed by the Coumadin clinic       * Notice:  This list has 8 medication(s) that are the same as other medications prescribed for you. Read the directions carefully, and ask your doctor or other care provider to review them with you.

## 2017-01-25 NOTE — PATIENT INSTRUCTIONS
Contact Numbers    [facility] [place]: [phone]  [facility] Triage:  [phone]    Call triage with chills and/or temperature greater than or equal to 100.5, uncontrolled nausea/vomiting, diarrhea, constipation, dizziness, shortness of breath, chest pain, bleeding, unexplained bruising, or any new/concerning symptoms, questions/concerns.     If you are having any concerning symptoms or wish to speak to a provider before your next infusion visit, please call your care coordinator or triage to notify them so we can adequately serve you.       After Hours: [phone]    If after hours, weekends, or holidays, call main hospital  and ask for Oncology doctor on call.           January 2017 Sunday Monday Tuesday Wednesday Thursday Friday Saturday   1     2     3     4     [facility] LAB DRAW   10:00 AM   (15 min.)    [facility] LAB DRAW   [facility] Lab Draw     UMP ONC INFUSION 120   10:30 AM   (120 min.)    ONCOLOGY INFUSION   [facility] 5     6     LAB    8:30 AM   (15 min.)   AN LAB   [facility] 7       8     9     10     LAB    1:00 PM   (15 min.)    LAB   [facility] Lab     UMP RETURN WITH ROOM    2:00 PM   (60 min.)   Vicki Chris LICSW   [facility]     UMP RETURN    3:00 PM   (30 min.)   Darlene Peck MD   [facility] 11     [facility] LAB DRAW   10:15 AM   (15 min.)    [facility] LAB DRAW   [facility] Lab Draw     UMP ONC INFUSION 180   10:30 AM   (180 min.)    ONCOLOGY INFUSION   [facility] 12     13     LAB    8:45 AM   (15 min.)   AN LAB   [facility] 14       15     16     17     18     [facility] LAB DRAW   10:00 AM   (15 min.)    [facility]RAW   [facility]     UMP ONC INFUSION 120   10:30 AM   (120 min.)    ONCOLOGY INFUSION   [facility] 19     20     21       22     23     24     25     [facility] LAB DRAW   10:00  AM   (15 min.)    [facility] LAB [facility]RAW   [facility]     UMP ONC INFUSION 180   10:30 AM   (180 min.)   UC ONCOLOGY INFUSION   [facility] 26     UMP RETURN ACTIVE TREATMENT    2:20 PM   (20 min.)   Erica Markham MD   [facility] 27     28       29     30     31 February 2017 Sunday Monday Tuesday Wednesday Thursday Friday Saturday                  1     [facility] LAB DRAW   10:00 AM   (15 min.)    [facility] LAB DRAW   [facility] Lab Draw     UMP ONC INFUSION 120   10:30 AM   (120 min.)   UC ONCOLOGY INFUSION   [facility] 2     3     4       5     6     7     UMP RETURN    1:00 PM   (30 min.)   Darlene Peck MD   [facility] 8     9     10     11       12     13     14     15     [facility] LAB DRAW    7:30 AM   (15 min.)    [facility] LAB DRAW   [facility] Lab Draw     UMP RETURN ACTIVE TREATMENT    8:00 AM   (40 min.)   Valentina Haney APRN CNP   [facility]     UMP ONC INFUSION 180    9:00 AM   (180 min.)    ONCOLOGY INFUSION   [facility] 16     17     18       19     20     21     22     [facility] LAB DRAW    9:30 AM   (15 min.)    [facility] LAB DRAW   [facility] Lab Draw     UMP ONC INFUSION 120   10:00 AM   (120 min.)    ONCOLOGY INFUSION   [facility] 23     24     25       26     27     28                                      Lab Results:  Recent Results (from the past 12 hour(s))   CBC with platelets differential    Collection Time: 01/25/17 10:25 AM   Result Value Ref Range    WBC 4.2 4.0 - 11.0 10e9/L    RBC Count 3.59 (L) 3.8 - 5.2 10e12/L    Hemoglobin 9.2 (L) 11.7 - 15.7 g/dL    Hematocrit 30.4 (L) 35.0 - 47.0 %    MCV 85 78 - 100 fl    MCH 25.6 (L) 26.5 - 33.0 pg    MCHC 30.3 (L) 31.5 - 36.5 g/dL    RDW 27.0 (H) 10.0 - 15.0 %    Platelet Count 270 150 - 450 10e9/L    Diff  Method Automated Method     % Neutrophils 58.7 %    % Lymphocytes 26.7 %    % Monocytes 10.6 %    % Eosinophils 2.6 %    % Basophils 0.7 %    % Immature Granulocytes 0.7 %    Nucleated RBCs 0 0 /100    Absolute Neutrophil 2.4 1.6 - 8.3 10e9/L    Absolute Lymphocytes 1.1 0.8 - 5.3 10e9/L    Absolute Monocytes 0.4 0.0 - 1.3 10e9/L    Absolute Eosinophils 0.1 0.0 - 0.7 10e9/L    Absolute Basophils 0.0 0.0 - 0.2 10e9/L    Abs Immature Granulocytes 0.0 0 - 0.4 10e9/L    Absolute Nucleated RBC 0.0    Magnesium    Collection Time: 01/25/17 10:25 AM   Result Value Ref Range    Magnesium 1.8 1.6 - 2.3 mg/dL   Potassium    Collection Time: 01/25/17 10:25 AM   Result Value Ref Range    Potassium 3.9 3.4 - 5.3 mmol/L   Protein qualitative urine    Collection Time: 01/25/17 11:00 AM   Result Value Ref Range    Protein Albumin Urine Negative NEG mg/dL

## 2017-01-25 NOTE — PROGRESS NOTES
Gynecologic Oncology Follow-Up Note  RE: Etelvina Jacobs  MRN: [id number]  : 1950  Date of Visit: 2017    CC: Etelvina Jacobs is a 66 year old year old female with recurrent metastatic ovarian cancer and a history of VAIN III who presents today for follow up regarding disease management.    HPI: Etelvina comes to the clinic accompanied by her  Luc. She has been tolerating chemotherapy fairly and feels her symptoms from her cancer, mostly her breathing, have improved with starting Taxol/Avastin. She has just completed brain radiation. She continues to use O2 at home via nasal cannula. She met with palliative care in which she received oxycontin which helped. Her anxiety has improved due to her breathing improvement. I am concerned that chemo would only worsen Etelvina's respiratory condition. Coughing up whitish phlegm. Wants to keep going with weekly Taxol and avastin. Feels better but energy comes and goes. Less anxiety on zoloft. Appetite is not great right now, occ vomits if coughs a lot. Little bit of tingling on left last two fingers-mild. Using cane because occ unsteady.         Oncology History:  2009 The patient presented to urgent care at the [facility] 2009 where she was admitted for workup of abdominal distention and a mass. Her creatinine upon presentation was 4.7. [facility]. As part of her work up prior to surgery she had a colonoscopy and Mammogram performed both negative.    09 she underwent exploratory laparotomy, total abdominal hysterectomy bilateral salpingo-oophorectomy, bilateral pelvic lymph node dissection, bilateral periaortic lymph node biopsy, total omentectomy, peritoneal washings submitted for cytology, diaphragmatic scraping, staging biopsies of the peritoneum and bilateral uterolysis with G[facility] Onc at [facility]. Her post operative course was complicated by ileus. She was discharged and [facility] after [facility]. Final pathology revealed stage IC grade 3 ovarian cancer. Initial  was 287.    2/11/11  -11  2/25/11:She is s/p 6 cycles of Carbo and Taxol, completed 5/14/10. CA-125 normalized at 9 on 6/11/2010. Also complains of anxiety which has increased with cancer diagnosis, she is seeing Psych regarding this and is not taking medications.    Vaginal dysplasia, she has a history of HSIL paps- last 6/11/2010. A subsequent colpo was performed revealing VAIN III at 2 oclock on the vaginal cuff. She then underwent EUA, vaginal bx and CO2 laser on 10/14/10 which the pathology returned as VAIN II-III. A repeat pap smear in January 2011 showed HSIL.    She underwent a follow up colposcopy with two upper vaginal biopsies. She presents to discuss treatment plans. Pathology from the vaginal biopsy shows VAIN III. She was schedule for EUA and vaginectomy:  3/24/11 Vaginectomy for VAIN III    Pathology showed VAIN III, no evidence of malignancy    9/21/2011 evaluated in the emergency room due to shortness of breath, Chest x-ray showed a left lung lesion. Patient is a former smoker.    11/7/11 Pap done: (HSIL)    11/10/11 CT chest/abd/pelvis demonstrated: Increased size of bilateral pulmonary nodules and new pleural based nodule on the left as detailed above. There is also a new prominent left hilar lymph node. These findings are concerning for worsening metastatic disease. No evidence for metastatic disease in the abdomen or pelvis. Slight decrease in prominence of the left vaginal cuff. Resolution of the previously identified fluid collection in the right pelvis. Stable appearance of a small hypodense nodule in the left lobe of the thyroid.    11/16/11 Left Thoracoscopic Lower Lobe Wedge Resection with Pleura. Pathology showed Immunostains show that the tumor is strongly positive for CA-125, and also positive for WT-1, consistent with a high grade serous carcinoma. The tumor is negative for the  "pulmonary adenocarcinoma marker TTF-1, and also negative for the mesothelial marker calretinin. The results further confirm the diagnosis of metastatic ovarian carcinoma.    12/5/12: Disease: Per Dr Sexton: discussed chemotherapy plan of carbo/taxol with patient and  who are agreeable to this treatment. Discussed having chest port placed for chemotherapy as prior chemotherapy regimen of carbo/taxol was given peripherally and patient had some difficulty with this delivery option. Vaginal intraepithelial neoplasia- Will continue to monitor and plan to repeat pap once chemotherapy (6 cycles) have finished.    12/13/11:  - 41. Cycle 1 Carbo/Taxol    1/4/12:  - 18. Cycle 2. Left wrist cellulitis after chemo treated locally with antibiotics x 7 days. Port placement planned. Some bone pain after chemo. Occasional use of vicodin controls pain. Nausea treated x 14 days. No vomiting. Hair loss. Anxiety worse. Pt bought a \"therapy\" puppy. Has had counseling in past. Some vaginal discomfort which is unchanged. Not sexually active.  1/25/12:  - 13. Cycle 3. Mild nausea well controlled with Ativan. Port placed without problems. Pain controlled. Creatinine low but stable.    2/15/12:  - 12. Cycle 4. Bone pain x one week after chemo. Nausea x one week. Using meds with reasonable management of symptoms. Percocet and ativan refills requested. Port without complications.    3/7/12:  - 30. Cycle 5 Anxiety/depressed mood - using ativan rarely. Emotional liability, anxiety and depressed mood worsening since diagnosis. Start zoloft 50 mg qd - counseling. Increase dose if indicated.    HTN - labile - continue to monitor    3/28/12 Ca 125 17.Cycle 6 of chemotherapy  4/20/2012 CT scan: Decreased pulmonary lesions and no other new lesions noted  Significant decrease in size of a 5 mm right lower lobe nodule (series 3 image 42) previously 9-10 mm. Near-complete resolution of a rounded nodule previously seen " along the medial aspect of the left major fissure (series 3 image 30).    7/20/12: CT Scan abdomen/pelvis: discussed with radiology - bilateral lung nodules continue to decrease in size with overall improving metastatic disease, no recurrence or new metastatic disease seen.    7/30/12: HSIL by pap    9/21/12: CA-125: 19.    10/19/12: Vaginal biopsy showed VAIN III. Recommendation for treatment with upper vaginal excision and CO2 laser to the vagina.  CA-125: 25  1/31/13: Colposcopy, excision of the left aspect of the upper vagina with vaginal biopsies, and CO2 laser of the upper vagina    Surgical pathology report:  A. Vagina, upper, biopsy  - High-grade squamous intraepithelial lesion / squamous cell carcinoma in-situ (VaIN 3)  - No definitive evidence of invasion  B. Vagina, #2, biopsy  - High-grade squamous intraepithelial lesion / squamous cell carcinoma in-situ (VaIN 3)  - No definitive evidence of invasion  2/13/13 10 day course topical 5-FU    04/23/2013:  - 96. Pap LSIL.  4/30/13 CT C/A/P IMPRESSION:  1. New subcarinal and posterior mediastinal lymphadenopathy since 7/20/2012 concerning for new metastatic disease.  2. Stable bilateral lung nodules measuring up to 5 mm in size since 10/19/2012. No new pulmonary nodules.  3. No evidence of metastatic disease in the abdomen.  4. Moderate hiatal hernia, unchanged.  Decision made to start Doxil.  5/31/13-8/23/13: Cycle #1-4 Doxil.  87, 103, 92, 121.  9/16/13 CT C/A/P Impression:  1. Progressive enlargement of right paratracheal lymph nodes and stable size of subcarinal and posterior mediastinal lymphadenopathy. This remains concerning for metastases.  2. Stable bilateral lung nodules, measuring up to 5 mm.  3. No evidence of metastatic disease in the abdomen and pelvis.  4. Moderate hiatal hernia  9/18/13: Cycle 1 Avastin/cytoxan  10/9/13: Cycle 2 Avastin/cytoxan.  - 46. RF cytoxan and ativan. Mildly increased nausea which ativan relieves. More  heartburn but using prilosec daily in am. Some vulvar symptoms.   10/30/13: Cycle 3 Avastin/cytoxan.  - 36.    11/18/13 CT C/A/P Impression:   1. Stable adenopathy within the mediastinum since 9/16/2013.  2. Pulmonary nodules measuring up to 5 mm in dimension, unchanged since 11/10/2011.  3. No evidence of metastatic disease within the abdomen and pelvis.  4. Moderate hiatal hernia.  5. Diverticulosis without CT evidence of diverticulitis.  11/20/13-1/2/14: Cycle #4-6 Avastin/Cytoxan.  36, 45, 46..  1/20/14 PET/CT IMPRESSION:   1. Several hypermetabolic and enlarged mediastinal lymph nodes concerning for metastatic disease from either the patient's ovarian or vaginal neoplasm. These lymph nodes have shown slowly progressive increase in size.  2. Minimal metabolic uptake with associated soft tissue thickening of the left vaginal cuff representing treated residual tumor. Decision to switch to Carboplatin/Gemzar.  1/22/14: Cycle #1 Carboplatin/Gemzar; CA-125 33 - missed day #8 Gemzar due to thrombocytopenia. C/O nausea, vomiting, nose bleeds.  Pap: Epithelial Cell Abnormality: Squamous Cell: High-grade squamous intraepithelial lesion (HSIL) encompassing: moderate and severe dysplasia, carcinoma In Situ/ CIN2 and NEFTALY 3.  2/12/14: Cycle #2 Carbo/Gemzar (dose reduced). reaction to her chemotherapy on day one of cycle 2 of carboplatin therapy requiring ED visit. Had choking and shortness of breath.  - 38. Colpo: carcinoma in situ.    2/19/14: 5-FU cream prescribed for VAIN III. H. Pylori breath test ordered. Will plan to continue carboplatin/gemzar therapy with next dose inpatient so that duration of infusion can be increased.    3/6/14: Gemzar/carbo (inpt) cycle #3.  - 38.    3/26/14: Cycle #4 Gemzar/Carboplatin under desensitization; CA-125 25  4/14/14: PET/CT: IMPRESSION:    1. Positive response to therapy of metastatic ovarian cancer with decreased size and hypermetabolism of mediastinal  lymphadenopathy.    2. No new recurrent or metastatic disease.    3. Red marrow activation in the spine and proximal long bones related to G-CSF injection  4/21/14-5/12/14: Cycle #5-6 Gemzar/Carboplatin under desensitization;   9, 21.  6/17/14 PET/CT Impression: In this patient with history of metastatic ovarian cancer there is evidence of worsening metastatic disease in the chest as several of the previously identified mediastinal nodes and a left hilar node have increased in metabolic activity and several have increased in size from comparison.  Etelvina Jacobs is a 63 year old woman with a diagnosis of recurrent, metastatic, stage IC grade 3 ovarian cancer and history of VAIN III s/p laser x 2 with history of recurrent high grade vaginal dysplasia and s/p left lower lobe resection for pulmonary metastasis. She is here today for follow up after completion of six cycles Carbo/Gemzar. Recent PET/CT shows evidence of worsening metastatic disease in the chest as several of the previously identified mediastinal nodes and a left hilar node have increased in metabolic activity and several have increased in size from comparison.    Patient has had continuous chemotherapy since 2013 and discussion was had with patient that it is important to consider quality of life which is best achieved with some time off chemotherapy and then proceed with chemo in two months.    Reviewed various chemotherapy agents including consideration of clinical trial. In conclusion patient has not received topotecan chemotherapy and this is a reasonable chemotehrapy agent ( as either weekly or 5 x dily regiment). Additional counseling provided by the chemotherapy coordinator and discussion of treatment schedule.  8/25/14: Had good chemo break. No concerns other than anxiety about chemo and potential reactions. She has had counseling regarding chemo side effects with Topotecan. Ativan refill requested. Anxiety remains chronic with prn use of  medication only as desired per pt. Not using effudex for VAIN - no bothersome symptoms. She wishes to continue to chemo therapy. No change in medications or history since last seen. Ca-125: 32  8/25-8/29/14: Cycle 1 Topotecan,  - 19  3 days of diarrhea after Neulasta shot-possible gastroenteritis.  9/15/14: Cycle 2 of topotecan.  10/6/14: Cycle 3 of 3. Topotecan.  - 22. Feels well. Some bone pain after neulasta. Uses few medications and wishes to avoid if possible for symptom managment. No diarrhea this cycle. Fatigue first week post chemo then improves. Continues to desire chemo treatments. Mood stable. No new concerns.    10/29/14: CT C/A/P: Mediastinal lymphadenopathy including a 1.4x1.7 cm right paratracheal lymph node, previously measured at 0.8x1.5 cm. IMPRESSION: Slight worsening in mediastinal lymphadenopathy, with no evidence of distant metastasis in abdomen or pelvis, in this patient with history of metastatic ovarian cancer.    Etelvina Jacobs is a 63 year old woman with a diagnosis of recurrent, metastatic, stage IC grade 3 ovarian cancer and history of VAIN III s/p laser x 2 with history of recurrent high grade vaginal dysplasia and s/p left lower lobe resection for pulmonary metastasis. She is here today for CT results after chemo treatment. Discussed results with slightly mediastinal lymphadenopathy.  Continue 3 more cycles of topotecan. Then repeat imaging.    11/3/14: Pap: HSIL; Ca 125 25  11/17/14: Topetecan #4  12/8/14: Topetecan #5  - 19.    12/11/14: Colposcopy after 11/3 pap was HSIL. Her mood is poor and she is not currently on medication for depression. She has used Zoloft in the past, which has worked well for her. Started 25 mg dose; declines counseling.    Vagina, 8:00 and 12:00, colposcopic biopsy:  -Vaginal intraepithelial neoplasia 3/high grade squamous intraepithelial lesion  -No invasive carcinoma identified (see comment)  12/29/14: Topetecan #6 Topetecan.  -17. Pt  on Zoloft 25 mg. Anxiety improved with some initial sleepiness which has now resolved. Had been on 50 mg in the past and she wishes to increase dose. Vaginal biopsy VAIN III and surgical consult pending with Dr Markham. Pt using neulasta. No new concerns. Weight gain pt attributes to eating more.    1/13/15: CT C/A/P  IMPRESSION:    1. Stable disease with stable mediastinal lymphadenopathy, presumed to be metastatic ovarian cancer. No significant change in small right hilar lymph nodes.  2. No evidence of metastatic disease in the abdomen and pelvis on this noncontrast evaluation.  =67  4/20/15: CT C/A/P IMPRESSION:   1. Overall worsening metastatic ovarian cancer. Specifically, there is increasing size and number of multiple pulmonary nodules, increasing size of mediastinal lymph nodes, and increasing size of soft tissue nodule in the right mesorectum since 1/13/2015.  2. Large hiatal hernia again seen.     4/28/15-7/23/15: Cycle #1-3 weekly Taxol  58, 31, 26.  9/10/15: admitted for bilateral PE; started on Lovenox BID. DC 9/11/15.  9/15/15: Heparin 10A 2.66 (peripheral). Instructed to hold evening dose of 9/16 and am dose of 9/17.  9/17/15: Cycle #4 weekly Taxol.  32.    11/2/15: CT c/a/p  IMPRESSION:  1. Increase in size of pulmonary nodules and mediastinal/hilar  lymphadenopathy since 4/20/2015, without significant change from the  chest CT of 9/10/2015 as above. Previously seen bilateral pulmonary  emboli are not appreciated on the current exam.    2. No evidence of disease progression in the abdomen/pelvis in this  patient with a history of metastatic ovarian cancer.    11/12/15: C5 D1 Taxol/Avastin.  32  11/19/15: C5D8 Taxol.  11/27/15: C5D15 Taxol.  12/3/15: C5D22 Taxol/Avastin.  12/10/15: C5D29 Taxol.  12/17/15: C5D36 Taxol.  12/21/15: 1L NS IV.    1/8/16: CT c/a/p  Impression:    In this patient with a history of ovarian cancer previously metastatic  to the chest:  1. Since 11/2/2015,  some mediastinal lymph nodes are slightly  decreased in size and some are unchanged. Nodules/nodes on the left  are decreased as described above. Additional indeterminate sub-4 mm  pulmonary nodules are stable. Attention on followup imaging is  recommended.  2. No evidence for disease progression in the abdomen or pelvis.    12/21/15: admitted for L great toe pain, possible brain mets with hemmorrhage.  24 hour events:    - IVF resuscitation  - MRI showing possible hemorrhagic metastases vs. Vascular incidents      1/25/16:Neurosurgery consult  continue observation    3/15/2016 - Right frontal lesion, she underwent Gamma Knife Radiosurgery in March 15, 2016.  At the time of GKR, the thin cut MRI demonstrated a smaller left frontal lesion as well, which was not seen in the previous MRI, likely due to the thickness of the image slices.  Therefore, she underwent treatment of the two lesions, right frontal and left frontal lesions.     4/11/2016: CT scan  . There is new ill-defined hazy fat stranding in the central pelvis  without well-defined nodularity on this exam. Findings are  nonspecific, but may be represent developing metastatic deposits.  Attention on followup imaging is recommended.  2. Minimal increase in a subcentimeter left lower lobe pleural-based  metastatic deposit, otherwise no significant disease progression in  the chest.    Patient Name: FRANCISCA GRACE   MR#: [id number]   Specimen #: [id number]   Collected: 4/22/2016   Received: 4/22/2016   Reported: 4/25/2016 12:15   Ordering Phy(s): ALEXEI GARCES     SPECIMEN(S):   Vaginal biopsy     FINAL DIAGNOSIS:   Vagina, biopsy:   -High grade squamous intraepithelial lesion (vaginal intraepithelial   neoplasia 3, VaIN 3)   -Fragmented specimen   -Most tissue fragments consist of epithelium only (cannot assess for the   presence or absence of invasive carcinoma)   -See comment     10/27/16  Complains of SOB, cough, low-grade fever for the past two months. Has  "worsened over the last three weeks and was diagnosed with bronchitis at [facility]. Was provided with an inhaler; no antibiotics. Additionally has had a 10 pound weight loss over the last month. Currently on warfarin for history of PE. Denies chest pain, abdominal pain, nausea, constipation, diarrhea, problems with urination, changes in vision, speech, hearing.     Rad-onc visit in September 2016 showed continued treatment effect for brain mets with no obvious new metastatic lesions.      11/3/2016  Hospitalized on 10/27/16 for acute SOB. Discharge on 3L of oxygen  CT on admission:  1. Negative for pulmonary embolus.  2. In this patient with history of ovarian cancer and previous  metastatic disease to the chest, there is evidence for significantly  advanced metastatic disease within the chest when compared with exam  dated 4/11/2016. 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 Superimposed infection could be  a consideration in the appropriate clinical context.  3. Worsening perirectal adenopathy and increased size of upper  abdominal lymph nodes consistent with metastatic progression.  4. Moderate hiatal hernia is stable.    Patient has continued needing 3L of oxygen at home. Has not been able to do much due to shortness of breath and fatigue. Notes that she frequently \"panics\" which makes her breathing worse. Mentions that she does not wish resuscitation or intubation.    11/9/16: C1D1 weekly Taxol with biweekly Avastin.  143.  12/28/16: C2D1 weekly Taxol with biweekly Avastin.  44.  12/30/16: MRI brain  Impression:  1. Significantly decreased size of metastatic lesion within the right  frontal lobe, with decreased surrounding vasogenic edema.  2. No new enhancing lesions identified.  3. Stable probable tiny meningioma " overlying the left frontal lobe.  4. Unchanged scattered T2 hyperintense foci within the periventricular  white matter, centrum semiovale, and brainstem, which are nonspecific  and most likely represent sequelae of chronic small vessel ischemic  Disease.    1/4/17: C2 D8 weekly Taxol  1/11/17: C2 D15 weekly Taxol with Avastin  1/18/17: C2 D22 weekly Taxol  1/25/17: C2 D28 weekly Taxol with Avastin    1/26/17: X-ray Chest   FINDINGS:  PA and lateral views of the chest. Stable position of right IJ  catheter with the tip projecting over low SVC. Cardiac silhouette  within normal limits. Interval worsening right greater than left  bilateral perihilar patchy and nodular opacities. No pleural effusion  or pneumothorax. Visualized upper abdomen is unremarkable.                                                                    IMPRESSION:  Interval worsening of right greater than left bilateral perihilar  patchy and nodular opacities favoring worsening of known metastatic  disease; however, superimposed infection cannot entirely be excluded  with radiograph.    Past Medical History   Diagnosis Date     VAIN III (vaginal intraepithelial neoplasia grade III) 3/24/11     Ovarian cancer (H) 12/09     Stage IC grade 3 ovarian cancer     Hypertension      Hyperlipidemia      Pulmonary nodules      Shortness of breath      Renal disease      insuffiency     Gastro-oesophageal reflux disease      Anxiety      Depression        Past Surgical History   Procedure Laterality Date     Vulva surgery  3/24/11     VAIN III     Hysterectomy radical  12/09     Stage IC grade 3 ovarian cancer,     Thoracoscopic wedge resection lung  11/16/2011     Procedure:THORACOSCOPIC WEDGE RESECTION LUNG; Left Thoracoscopic  Lower Lobe Wedge Resection with Pleura; Surgeon:TAMARA SERVIN; Location:[facility] OR     Biopsy vaginal  1/31/2013     Procedure: BIOPSY VAGINAL;  Colposcopy, CO2 Laser to Vagina, Upper Vaginal biopsies;  Surgeon: Marietta Sexton MD;   Location: [facility] OR     Laser co2 vagina  1/31/2013     Procedure: LASER CO2 VAGINA;;  Surgeon: Marietta Sexton MD;  Location: [facility] OR       Current Outpatient Prescriptions   Medication     albuterol (PROAIR HFA/PROVENTIL HFA/VENTOLIN HFA) 108 (90 BASE) MCG/ACT Inhaler     warfarin (COUMADIN) 2.5 MG tablet     LORazepam (ATIVAN) 1 MG tablet     HYDROmorphone (DILAUDID) 2 MG tablet     oxyCODONE (OXYCONTIN) 10 MG 12 hr tablet     sertraline (ZOLOFT) 50 MG tablet     sodium chloride (OCEAN) 0.65 % nasal spray     umeclidinium-vilanterol (ANORO ELLIPTA) 62.5-25 MCG/INH oral inhaler     albuterol (ALBUTEROL) 108 (90 BASE) MCG/ACT Inhaler     LORazepam (ATIVAN) 0.5 MG tablet     prochlorperazine (COMPAZINE) 5 MG tablet     senna (SENOKOT) 8.6 MG tablet     order for DME     warfarin (COUMADIN) 5 MG tablet     ondansetron (ZOFRAN) 8 MG tablet     omeprazole (PRILOSEC) 20 MG capsule     order for DME     prochlorperazine (COMPAZINE) 10 MG tablet     ipratropium - albuterol 0.5 mg/2.5 mg/3 mL (DUONEB) 0.5-2.5 (3) MG/3ML nebulization     DiphenhydrAMINE HCl (BENADRYL PO)     No current facility-administered medications for this visit.          Allergies   Allergen Reactions     Carboplatin Difficulty breathing     Reaction to Carboplatin on 8th dose.        Family History   Problem Relation Age of Onset     Cancer - colorectal Paternal Grandmother      CANCER Paternal Aunt      stomach     C.A.D. Father      Hypertension Mother      DIABETES Mother        Social History     Social History     Marital Status:      Spouse Name: N/A     Number of Children: N/A     Years of Education: N/A     Occupational History     Not on file.     Social History Main Topics     Smoking status: Former Smoker     Smokeless tobacco: Never Used      Comment: Patient hasn't smoked for over 5 years 04/23/15     Alcohol Use: No      Comment: no alcohol use     Drug Use: No     Sexual Activity: No     Other Topics Concern     Not on file      Social History Narrative       Review of Systems    Answers for HPI/ROS submitted by the patient on 1/24/2017   General Symptoms: Yes  Skin Symptoms: No  HENT Symptoms: Yes  EYE SYMPTOMS: No  HEART SYMPTOMS: Yes  LUNG SYMPTOMS: Yes  INTESTINAL SYMPTOMS: Yes  URINARY SYMPTOMS: No  GYNECOLOGIC SYMPTOMS: No  BREAST SYMPTOMS: No  SKELETAL SYMPTOMS: No  BLOOD SYMPTOMS: No  NERVOUS SYSTEM SYMPTOMS: No  MENTAL HEALTH SYMPTOMS: Yes  Fever: No  Loss of appetite: Yes  Weight loss: No  Weight gain: No  Fatigue: Yes  Night sweats: No  Chills: No  Increased stress: Yes  Excessive hunger: No  Excessive thirst: No  Feeling hot or cold when others believe the temperature is normal: No  Loss of height: No  Post-operative complications: No  Surgical site pain: No  Hallucinations: No  Change in or Loss of Energy: Yes  Hyperactivity: No  Confusion: Yes  Ear pain: No  Ear discharge: No  Hearing loss: No  Tinnitus: Yes  Nosebleeds: Yes  Congestion: Yes  Sinus pain: No  Trouble swallowing: No   Voice hoarseness: No  Mouth sores: Yes  Sore throat: No  Tooth pain: No  Gum tenderness: No  Bleeding gums: No  Change in taste: No  Change in sense of smell: No  Dry mouth: No  Hearing aid used: No  Neck lump: No  Cough: Yes  Sputum or phlegm: Yes  Coughing up blood: No  Difficulty breating or shortness of breath: Yes  Snoring: No  Wheezing: Yes  Difficulty breathing on exertion: Yes  Respiratory pain: No  Nighttime Cough: Yes  Difficulty breathing when lying flat: Yes  Chest pain or pressure: No  Fast or irregular heartbeat: No  Pain in legs with walking: No  Swelling in feet or ankles: Yes  Trouble breathing while lying down: Yes  Fingers or Toes appear blue: No  High blood pressure: No  Low blood pressure: No  Fainting: No  Murmurs: No  Chest pain on exertion: No  Chest pain at rest: No  Cramping pain in leg during exercise: No  Pacemaker: No  Varicose veins: No  Edema or swelling: No  Fast heart beat: Yes  Wake up at night with shortness  "of breath: Yes  Heart flutters: No  Light-headedness: Yes  Exercise intolerance: Yes  Heart burn or indigestion: Yes  Nausea: Yes  Vomiting: Yes  Abdominal pain: No  Bloating: No  Constipation: No  Diarrhea: No  Blood in stool: No  Black stools: No  Rectal or Anal pain: No  Fecal incontinence: No  Rectal bleeding: No  Yellowing of skin or eyes: No  Vomit with blood: No  Change in stools: No  Hemorrhoids: No  Nervous or Anxious: Yes  Depression: Yes  Trouble sleeping: Yes  Trouble thinking or concentrating: Yes  Mood changes: Yes  Panic attacks: Yes    I have reviewed and addressed the patient's review of symptoms for today's visit.         Physical Exam:    /86 mmHg  Pulse 101  Temp(Src) 98  F (36.7  C) (Oral)  Resp 18  Ht 1.575 m (5' 2\")  Wt 79.243 kg (174 lb 11.2 oz)  BMI 31.94 kg/m2  SpO2 93%    CONSTITUTIONAL: Alert non-toxic appearing female in no acute distress  HEAD: Normocephalic, atraumatic  EYES: PERRLA; no scleral icterus  ENT: Oropharynx pink without lesions, lips cracked at corners  NECK: Neck supple without lymphadenopathy  RESPIRATORY: Lungs clear to auscultation, no increased work of breathing noted  CV: Tachycardic with regular rhythm, S1S2, no clicks, murmurs, rubs, or gallops; bilateral lower extremities with trace edema, dorsalis pedis pulses 2+ bilaterally  GASTROINTESTINAL: Normoactive bowel sounds x4 quadrants, abdomen soft, non-distended, and non-tender to palpation without masses or organomegaly  GENITOURINARY: Not indicated  LYMPHATIC: Cervical, supraclavicular, and inguinal nodes without lymphadenopathy  MUSCULOSKELETAL: Moves all extremities, no obvious muscle wasting  NEUROLOGIC: No gross deficits, slow gait but steady using cane  SKIN: Appropriate color for race, warm and dry, no rashes or lesions to unclothed skin  PSYCHIATRIC: Pleasant and interactive, affect bright, makes appropriate eye contact, thought process linear    Labs:        Date Value Ref Range Status "   12/28/2016 44* 0 - 30 U/mL Final     Comment:     Assay Method:  Chemiluminescence using Siemens Centaur XP   11/09/2016 143* 0 - 30 U/mL Final     Comment:     Assay Method:  Chemiluminescence using Siemens Centaur XP   10/27/2016 99* 0 - 30 U/mL Final     Comment:     Assay Method:  Chemiluminescence using Siemens Centaur XP   09/30/2016 77* 0 - 30 U/mL Final     Comment:     Assay Method:  Chemiluminescence using Siemens Centaur XP   04/11/2016 32* 0 - 30 U/mL Final   03/14/2016 34* 0 - 30 U/mL Final   02/10/2016 30 0 - 30 U/mL Final   11/12/2015 32* 0 - 30 U/mL Final   09/17/2015 32* 0 - 30 U/mL Final   07/23/2015 26 0 - 30 U/mL Final           Assessment/Plan:   1) Recurrent metastatic ovarian cancer: Proceed with cycle two of weekly Taxol with every 2 weeks Avastin, cycle 2 Day 36. Plan 3 cycles followed by imaging and treatment planning with Dr. Markham. Creatinine elevated, however, this appears to be per her recent baseline. Discussed epistaxis and when to seek emergency care for this- saline nasal spray ordered. Walker ordered for the times when she is more short of breath or weak. To continue to see palliative care. Reviewed signs and symptoms for when she should contact the clinic or seek additional care, including but not limited to fever, chills, inability to keep down food or fluids, nausea and vomiting not controlled with antiemetics, and diarrhea leading to dehydration. Patient to contact the clinic with any questions or concerns in the interim. Discussed with patient that she never needs to proceed with chemotherapy if she does not want to and if she ever wants to switch to hospice she may. CXR today.  2) Anxiety: Reports mild to moderate improvement in anxiety. Zoloft dose increased to 50mg PO daily.  3) Health maintenance issues discussed include to follow up with PCP for non-gynecologic concerns and co-morbid conditions. To follow up with Coumadin clinic regarding her INR.  4) Patient  verbalized understanding of and agreement with plan    A total of 30 minutes of face to face time were spent with the patient with over 50% of that time spent in counseling, coordination of care, education, and symptom management.    Thank you for allowing me to participate in the care of this patient.  If you have any questions or concerns, please do not hesitate to contact me.    Erica Markham MD    Department of Ob/Gyn and Women's Health  Division of Gynecologic Oncology  [facility]  [phone]

## 2017-01-25 NOTE — NURSING NOTE
Chief Complaint   Patient presents with     Port Draw     Port accessed by RN, labs collected and sent, line flushed with NS and heparin, pt tolerated well. Vitals taken and pt checked in for next appt.     Nicky Hager

## 2017-01-25 NOTE — PROGRESS NOTES
Infusion Nursing Note:  Patient presents today for Cycle 2 Day 29 Taxol / Avastin.    Patient seen by provider today: No    Note: Patient arrives to infusion today with her . She reports feeling well today and denies recent fevers / chills. Patient presents no new complaints or concerts today. She will see Dr. Markham tomorrow.    Intravenous Access:  Implanted Port.    Treatment Conditions:  HGB      9.2   1/25/2017  WBC      4.2   1/25/2017   ANEU      2.4   1/25/2017  PLT      270   1/25/2017     NA      140   12/28/2016                POTASSIUM      3.9   1/25/2017        MAG      1.8   1/25/2017         CR     1.36   12/28/2016                LUDWIG      8.7   12/28/2016             BILITOTAL      0.3   12/28/2016        ALBUMIN      3.0   12/28/2016                 ALT       14   12/28/2016        AST       16   12/28/2016  Urine protein is negative.      Post Infusion Assessment:  Patient tolerated infusion without incident.  Blood return noted pre and post infusion.  Site patent and intact, free from redness, edema or discomfort.  No evidence of extravasations.  Access discontinued per protocol.    Discharge Plan:   Prescription refills given for Ativan and Zoloft.  Discharge instructions reviewed with: Patient.  Patient and/or family verbalized understanding of discharge instructions and all questions answered.  Copy of AVS reviewed with patient and/or family.  Patient will return tomorrow for next appointment and 2/1 for next infusion appointment.  Patient discharged in stable condition accompanied by: self and .  Departure Mode: Ambulatory.  Face to Face time: 0 minutes.    Kevin Jasmine RN.

## 2017-01-26 NOTE — NURSING NOTE
"Etelvina Jacobs is a 66 year old female who presents for:  Chief Complaint   Patient presents with     Oncology Clinic Visit     return patient for follow up appointment related to Ovarian cancer, left (H)        Initial Vitals:  /86 mmHg  Pulse 101  Temp(Src) 98  F (36.7  C) (Oral)  Resp 18  Ht 1.575 m (5' 2\")  Wt 79.243 kg (174 lb 11.2 oz)  BMI 31.94 kg/m2  SpO2 93% Estimated body mass index is 31.94 kg/(m^2) as calculated from the following:    Height as of this encounter: 1.575 m (5' 2\").    Weight as of this encounter: 79.243 kg (174 lb 11.2 oz).. Body surface area is 1.86 meters squared. BP completed using cuff size: large  No Pain (0) No LMP recorded. Patient has had a hysterectomy. Allergies and medications reviewed.     Medications: Medication refills not needed today.  Pharmacy name entered into [facility]:    [facility] - [place], [place] - [address] [facility] [place], [place] - [address] [place] [address] [phone]  [place] [facility] [id number] [place] [id number] [facility] [address]    Comments: patient denied pain/discomfort. Patient mentioned shortness of breath.     5 minutes for nursing intake (face to face time)   Geena Avila CMA          "

## 2017-01-26 NOTE — LETTER
2017       RE: Etelvina Jacobs  [address]  [place] [address]     Dear Colleague,    Thank you for referring your patient, Etelvina Jacobs, to the [facility]. Please see a copy of my visit note below.    Gynecologic Oncology Follow-Up Note  RE: Etelvina Jacobs  MRN: [id number]  : 1950  Date of Visit: 2017    CC: Etelvina Jacobs is a 66 year old year old female with recurrent metastatic ovarian cancer and a history of VAIN III who presents today for follow up regarding disease management.    HPI: Etelvina comes to the clinic accompanied by her  Luc. She has been tolerating chemotherapy fairly and feels her symptoms from her cancer, mostly her breathing, have improved with starting Taxol/Avastin. She has just completed brain radiation. She continues to use O2 at home via nasal cannula. She met with palliative care in which she received oxycontin which helped. Her anxiety has improved due to her breathing improvement. I am concerned that chemo would only worsen Etelvina's respiratory condition. Coughing up whitish phlegm. Wants to keep going with weekly Taxol and avastin. Feels better but energy comes and goes. Less anxiety on zoloft. Appetite is not great right now, occ vomits if coughs a lot. Little bit of tingling on left last two fingers-mild. Using cane because occ unsteady.         Oncology History:  2009 The patient presented to urgent care at the [facility] 2009 where she was admitted for workup of abdominal distention and a mass. Her creatinine upon presentation was 4.7. [facility]. As part of her work up prior to surgery she had a colonoscopy and Mammogram performed both negative.    09 she underwent exploratory laparotomy, total abdominal hysterectomy bilateral salpingo-oophorectomy, bilateral pelvic lymph node dissection, bilateral periaortic lymph node biopsy, total omentectomy, peritoneal  washings submitted for cytology, diaphragmatic scraping, staging biopsies of the peritoneum and bilateral uterolysis with G[facility] Onc at [facility]. Her post operative course was complicated by ileus. She was discharged and the [facility] after [facility]. Final pathology revealed stage IC grade 3 ovarian cancer. Initial  was 287.    2/11/11  -11  2/25/11:She is s/p 6 cycles of Carbo and Taxol, completed 5/14/10. CA-125 normalized at 9 on 6/11/2010. Also complains of anxiety which has increased with cancer diagnosis, she is seeing Psych regarding this and is not taking medications.    Vaginal dysplasia, she has a history of HSIL paps- last 6/11/2010. A subsequent colpo was performed revealing VAIN III at 2 oclock on the vaginal cuff. She then underwent EUA, vaginal bx and CO2 laser on 10/14/10 which the pathology returned as VAIN II-III. A repeat pap smear in January 2011 showed HSIL.    She underwent a follow up colposcopy with two upper vaginal biopsies. She presents to discuss treatment plans. Pathology from the vaginal biopsy shows VAIN III. She was schedule for EUA and vaginectomy:  3/24/11 Vaginectomy for VAIN III    Pathology showed VAIN III, no evidence of malignancy    9/21/2011 evaluated in the emergency room due to shortness of breath, Chest x-ray showed a left lung lesion. Patient is a former smoker.    11/7/11 Pap done: (HSIL)    11/10/11 CT chest/abd/pelvis demonstrated: Increased size of bilateral pulmonary nodules and new pleural based nodule on the left as detailed above. There is also a new prominent left hilar lymph node. These findings are concerning for worsening metastatic disease. No evidence for metastatic disease in the abdomen or pelvis. Slight decrease in prominence of the left vaginal cuff. Resolution of the previously identified fluid collection in the right pelvis. Stable appearance of a small hypodense nodule in the left lobe of the thyroid.    11/16/11 Left  "Thoracoscopic Lower Lobe Wedge Resection with Pleura. Pathology showed Immunostains show that the tumor is strongly positive for CA-125, and also positive for WT-1, consistent with a high grade serous carcinoma. The tumor is negative for the pulmonary adenocarcinoma marker TTF-1, and also negative for the mesothelial marker calretinin. The results further confirm the diagnosis of metastatic ovarian carcinoma.    12/5/12: Disease: Per Dr Sexton: discussed chemotherapy plan of carbo/taxol with patient and  who are agreeable to this treatment. Discussed having chest port placed for chemotherapy as prior chemotherapy regimen of carbo/taxol was given peripherally and patient had some difficulty with this delivery option. Vaginal intraepithelial neoplasia- Will continue to monitor and plan to repeat pap once chemotherapy (6 cycles) have finished.    12/13/11:  - 41. Cycle 1 Carbo/Taxol    1/4/12:  - 18. Cycle 2. Left wrist cellulitis after chemo treated locally with antibiotics x 7 days. Port placement planned. Some bone pain after chemo. Occasional use of vicodin controls pain. Nausea treated x 14 days. No vomiting. Hair loss. Anxiety worse. Pt bought a \"therapy\" puppy. Has had counseling in past. Some vaginal discomfort which is unchanged. Not sexually active.  1/25/12:  - 13. Cycle 3. Mild nausea well controlled with Ativan. Port placed without problems. Pain controlled. Creatinine low but stable.    2/15/12:  - 12. Cycle 4. Bone pain x one week after chemo. Nausea x one week. Using meds with reasonable management of symptoms. Percocet and ativan refills requested. Port without complications.    3/7/12:  - 30. Cycle 5 Anxiety/depressed mood - using ativan rarely. Emotional liability, anxiety and depressed mood worsening since diagnosis. Start zoloft 50 mg qd - counseling. Increase dose if indicated.    HTN - labile - continue to monitor    3/28/12 Ca 125 17.Cycle 6 of " chemotherapy  4/20/2012 CT scan: Decreased pulmonary lesions and no other new lesions noted  Significant decrease in size of a 5 mm right lower lobe nodule (series 3 image 42) previously 9-10 mm. Near-complete resolution of a rounded nodule previously seen along the medial aspect of the left major fissure (series 3 image 30).    7/20/12: CT Scan abdomen/pelvis: discussed with radiology - bilateral lung nodules continue to decrease in size with overall improving metastatic disease, no recurrence or new metastatic disease seen.    7/30/12: HSIL by pap    9/21/12: CA-125: 19.    10/19/12: Vaginal biopsy showed VAIN III. Recommendation for treatment with upper vaginal excision and CO2 laser to the vagina.  CA-125: 25  1/31/13: Colposcopy, excision of the left aspect of the upper vagina with vaginal biopsies, and CO2 laser of the upper vagina    Surgical pathology report:  A. Vagina, upper, biopsy  - High-grade squamous intraepithelial lesion / squamous cell carcinoma in-situ (VaIN 3)  - No definitive evidence of invasion  B. Vagina, #2, biopsy  - High-grade squamous intraepithelial lesion / squamous cell carcinoma in-situ (VaIN 3)  - No definitive evidence of invasion  2/13/13 10 day course topical 5-FU    04/23/2013:  - 96. Pap LSIL.  4/30/13 CT C/A/P IMPRESSION:  1. New subcarinal and posterior mediastinal lymphadenopathy since 7/20/2012 concerning for new metastatic disease.  2. Stable bilateral lung nodules measuring up to 5 mm in size since 10/19/2012. No new pulmonary nodules.  3. No evidence of metastatic disease in the abdomen.  4. Moderate hiatal hernia, unchanged.  Decision made to start Doxil.  5/31/13-8/23/13: Cycle #1-4 Doxil.  87, 103, 92, 121.  9/16/13 CT C/A/P Impression:  1. Progressive enlargement of right paratracheal lymph nodes and stable size of subcarinal and posterior mediastinal lymphadenopathy. This remains concerning for metastases.  2. Stable bilateral lung nodules, measuring up  to 5 mm.  3. No evidence of metastatic disease in the abdomen and pelvis.  4. Moderate hiatal hernia  9/18/13: Cycle 1 Avastin/cytoxan  10/9/13: Cycle 2 Avastin/cytoxan.  - 46. RF cytoxan and ativan. Mildly increased nausea which ativan relieves. More heartburn but using prilosec daily in am. Some vulvar symptoms.   10/30/13: Cycle 3 Avastin/cytoxan.  - 36.    11/18/13 CT C/A/P Impression:   1. Stable adenopathy within the mediastinum since 9/16/2013.  2. Pulmonary nodules measuring up to 5 mm in dimension, unchanged since 11/10/2011.  3. No evidence of metastatic disease within the abdomen and pelvis.  4. Moderate hiatal hernia.  5. Diverticulosis without CT evidence of diverticulitis.  11/20/13-1/2/14: Cycle #4-6 Avastin/Cytoxan.  36, 45, 46..  1/20/14 PET/CT IMPRESSION:   1. Several hypermetabolic and enlarged mediastinal lymph nodes concerning for metastatic disease from either the patient's ovarian or vaginal neoplasm. These lymph nodes have shown slowly progressive increase in size.  2. Minimal metabolic uptake with associated soft tissue thickening of the left vaginal cuff representing treated residual tumor. Decision to switch to Carboplatin/Gemzar.  1/22/14: Cycle #1 Carboplatin/Gemzar; CA-125 33 - missed day #8 Gemzar due to thrombocytopenia. C/O nausea, vomiting, nose bleeds.  Pap: Epithelial Cell Abnormality: Squamous Cell: High-grade squamous intraepithelial lesion (HSIL) encompassing: moderate and severe dysplasia, carcinoma In Situ/ CIN2 and NEFTALY 3.  2/12/14: Cycle #2 Carbo/Gemzar (dose reduced). reaction to her chemotherapy on day one of cycle 2 of carboplatin therapy requiring ED visit. Had choking and shortness of breath.  - 38. Colpo: carcinoma in situ.    2/19/14: 5-FU cream prescribed for VAIN III. H. Pylori breath test ordered. Will plan to continue carboplatin/gemzar therapy with next dose inpatient so that duration of infusion can be increased.    3/6/14: Gemzar/carbo  (inpt) cycle #3.  - 38.    3/26/14: Cycle #4 Gemzar/Carboplatin under desensitization; CA-125 25  4/14/14: PET/CT: IMPRESSION:    1. Positive response to therapy of metastatic ovarian cancer with decreased size and hypermetabolism of mediastinal lymphadenopathy.    2. No new recurrent or metastatic disease.    3. Red marrow activation in the spine and proximal long bones related to G-CSF injection  4/21/14-5/12/14: Cycle #5-6 Gemzar/Carboplatin under desensitization;   9, 21.  6/17/14 PET/CT Impression: In this patient with history of metastatic ovarian cancer there is evidence of worsening metastatic disease in the chest as several of the previously identified mediastinal nodes and a left hilar node have increased in metabolic activity and several have increased in size from comparison.  Etelvina Jacobs is a 63 year old woman with a diagnosis of recurrent, metastatic, stage IC grade 3 ovarian cancer and history of VAIN III s/p laser x 2 with history of recurrent high grade vaginal dysplasia and s/p left lower lobe resection for pulmonary metastasis. She is here today for follow up after completion of six cycles Carbo/Gemzar. Recent PET/CT shows evidence of worsening metastatic disease in the chest as several of the previously identified mediastinal nodes and a left hilar node have increased in metabolic activity and several have increased in size from comparison.    Patient has had continuous chemotherapy since 2013 and discussion was had with patient that it is important to consider quality of life which is best achieved with some time off chemotherapy and then proceed with chemo in two months.    Reviewed various chemotherapy agents including consideration of clinical trial. In conclusion patient has not received topotecan chemotherapy and this is a reasonable chemotehrapy agent ( as either weekly or 5 x dily regiment). Additional counseling provided by the chemotherapy coordinator and discussion of  treatment schedule.  8/25/14: Had good chemo break. No concerns other than anxiety about chemo and potential reactions. She has had counseling regarding chemo side effects with Topotecan. Ativan refill requested. Anxiety remains chronic with prn use of medication only as desired per pt. Not using effudex for VAIN - no bothersome symptoms. She wishes to continue to chemo therapy. No change in medications or history since last seen. Ca-125: 32  8/25-8/29/14: Cycle 1 Topotecan,  - 19  3 days of diarrhea after Neulasta shot-possible gastroenteritis.  9/15/14: Cycle 2 of topotecan.  10/6/14: Cycle 3 of 3. Topotecan.  - 22. Feels well. Some bone pain after neulasta. Uses few medications and wishes to avoid if possible for symptom managment. No diarrhea this cycle. Fatigue first week post chemo then improves. Continues to desire chemo treatments. Mood stable. No new concerns.    10/29/14: CT C/A/P: Mediastinal lymphadenopathy including a 1.4x1.7 cm right paratracheal lymph node, previously measured at 0.8x1.5 cm. IMPRESSION: Slight worsening in mediastinal lymphadenopathy, with no evidence of distant metastasis in abdomen or pelvis, in this patient with history of metastatic ovarian cancer.    Etelvina Jacobs is a 63 year old woman with a diagnosis of recurrent, metastatic, stage IC grade 3 ovarian cancer and history of VAIN III s/p laser x 2 with history of recurrent high grade vaginal dysplasia and s/p left lower lobe resection for pulmonary metastasis. She is here today for CT results after chemo treatment. Discussed results with slightly mediastinal lymphadenopathy.  Continue 3 more cycles of topotecan. Then repeat imaging.    11/3/14: Pap: HSIL; Ca 125 25  11/17/14: Topetecan #4  12/8/14: Topetecan #5  - 19.    12/11/14: Colposcopy after 11/3 pap was HSIL. Her mood is poor and she is not currently on medication for depression. She has used Zoloft in the past, which has worked well for her. Started 25 mg  dose; declines counseling.    Vagina, 8:00 and 12:00, colposcopic biopsy:  -Vaginal intraepithelial neoplasia 3/high grade squamous intraepithelial lesion  -No invasive carcinoma identified (see comment)  12/29/14: Topetecan #6 Topetecan.  -17. Pt on Zoloft 25 mg. Anxiety improved with some initial sleepiness which has now resolved. Had been on 50 mg in the past and she wishes to increase dose. Vaginal biopsy VAIN III and surgical consult pending with Dr Markham. Pt using neulasta. No new concerns. Weight gain pt attributes to eating more.    1/13/15: CT C/A/P  IMPRESSION:    1. Stable disease with stable mediastinal lymphadenopathy, presumed to be metastatic ovarian cancer. No significant change in small right hilar lymph nodes.  2. No evidence of metastatic disease in the abdomen and pelvis on this noncontrast evaluation.  =05  4/20/15: CT C/A/P IMPRESSION:   1. Overall worsening metastatic ovarian cancer. Specifically, there is increasing size and number of multiple pulmonary nodules, increasing size of mediastinal lymph nodes, and increasing size of soft tissue nodule in the right mesorectum since 1/13/2015.  2. Large hiatal hernia again seen.     4/28/15-7/23/15: Cycle #1-3 weekly Taxol  58, 31, 26.  9/10/15: admitted for bilateral PE; started on Lovenox BID. DC 9/11/15.  9/15/15: Heparin 10A 2.66 (peripheral). Instructed to hold evening dose of 9/16 and am dose of 9/17.  9/17/15: Cycle #4 weekly Taxol.  32.    11/2/15: CT c/a/p  IMPRESSION:  1. Increase in size of pulmonary nodules and mediastinal/hilar  lymphadenopathy since 4/20/2015, without significant change from the  chest CT of 9/10/2015 as above. Previously seen bilateral pulmonary  emboli are not appreciated on the current exam.    2. No evidence of disease progression in the abdomen/pelvis in this  patient with a history of metastatic ovarian cancer.    11/12/15: C5 D1 Taxol/Avastin.  32  11/19/15: C5D8 Taxol.  11/27/15:  C5D15 Taxol.  12/3/15: C5D22 Taxol/Avastin.  12/10/15: C5D29 Taxol.  12/17/15: C5D36 Taxol.  12/21/15: 1L NS IV.    1/8/16: CT c/a/p  Impression:    In this patient with a history of ovarian cancer previously metastatic  to the chest:  1. Since 11/2/2015, some mediastinal lymph nodes are slightly  decreased in size and some are unchanged. Nodules/nodes on the left  are decreased as described above. Additional indeterminate sub-4 mm  pulmonary nodules are stable. Attention on followup imaging is  recommended.  2. No evidence for disease progression in the abdomen or pelvis.    12/21/15: admitted for L great toe pain, possible brain mets with hemmorrhage.  24 hour events:    - IVF resuscitation  - MRI showing possible hemorrhagic metastases vs. Vascular incidents      1/25/16:Neurosurgery consult  continue observation    3/15/2016 - Right frontal lesion, she underwent Gamma Knife Radiosurgery in March 15, 2016.  At the time of GKR, the thin cut MRI demonstrated a smaller left frontal lesion as well, which was not seen in the previous MRI, likely due to the thickness of the image slices.  Therefore, she underwent treatment of the two lesions, right frontal and left frontal lesions.     4/11/2016: CT scan  . There is new ill-defined hazy fat stranding in the central pelvis  without well-defined nodularity on this exam. Findings are  nonspecific, but may be represent developing metastatic deposits.  Attention on followup imaging is recommended.  2. Minimal increase in a subcentimeter left lower lobe pleural-based  metastatic deposit, otherwise no significant disease progression in  the chest.    Patient Name: FRANCISCA GRACE   MR#: [id number]   Specimen #: [id number]   Collected: 4/22/2016   Received: 4/22/2016   Reported: 4/25/2016 12:15   Ordering Phy(s): ALEXEI GARCES     SPECIMEN(S):   Vaginal biopsy     FINAL DIAGNOSIS:   Vagina, biopsy:   -High grade squamous intraepithelial lesion (vaginal intraepithelial  "  neoplasia 3, VaIN 3)   -Fragmented specimen   -Most tissue fragments consist of epithelium only (cannot assess for the   presence or absence of invasive carcinoma)   -See comment     10/27/16  Complains of SOB, cough, low-grade fever for the past two months. Has worsened over the last three weeks and was diagnosed with bronchitis at [facility]. Was provided with an inhaler; no antibiotics. Additionally has had a 10 pound weight loss over the last month. Currently on warfarin for history of PE. Denies chest pain, abdominal pain, nausea, constipation, diarrhea, problems with urination, changes in vision, speech, hearing.     Rad-onc visit in September 2016 showed continued treatment effect for brain mets with no obvious new metastatic lesions.      11/3/2016  Hospitalized on 10/27/16 for acute SOB. Discharge on 3L of oxygen  CT on admission:  1. Negative for pulmonary embolus.  2. In this patient with history of ovarian cancer and previous  metastatic disease to the chest, there is evidence for significantly  advanced metastatic disease within the chest when compared with exam  dated 4/11/2016. 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 Superimposed infection could be  a consideration in the appropriate clinical context.  3. Worsening perirectal adenopathy and increased size of upper  abdominal lymph nodes consistent with metastatic progression.  4. Moderate hiatal hernia is stable.    Patient has continued needing 3L of oxygen at home. Has not been able to do much due to shortness of breath and fatigue. Notes that she frequently \"panics\" which makes her breathing worse. Mentions that she does not wish resuscitation or intubation.    11/9/16: C1D1 weekly Taxol with biweekly Avastin.  143.  12/28/16: C2D1 weekly Taxol with biweekly " Avastin.  44.  12/30/16: MRI brain  Impression:  1. Significantly decreased size of metastatic lesion within the right  frontal lobe, with decreased surrounding vasogenic edema.  2. No new enhancing lesions identified.  3. Stable probable tiny meningioma overlying the left frontal lobe.  4. Unchanged scattered T2 hyperintense foci within the periventricular  white matter, centrum semiovale, and brainstem, which are nonspecific  and most likely represent sequelae of chronic small vessel ischemic  Disease.    1/4/17: C2 D8 weekly Taxol  1/11/17: C2 D15 weekly Taxol with Avastin  1/18/17: C2 D22 weekly Taxol  1/25/17: C2 D28 weekly Taxol with Avastin    1/26/17: X-ray Chest   FINDINGS:  PA and lateral views of the chest. Stable position of right IJ  catheter with the tip projecting over low SVC. Cardiac silhouette  within normal limits. Interval worsening right greater than left  bilateral perihilar patchy and nodular opacities. No pleural effusion  or pneumothorax. Visualized upper abdomen is unremarkable.                                                                    IMPRESSION:  Interval worsening of right greater than left bilateral perihilar  patchy and nodular opacities favoring worsening of known metastatic  disease; however, superimposed infection cannot entirely be excluded  with radiograph.    Past Medical History   Diagnosis Date     VAIN III (vaginal intraepithelial neoplasia grade III) 3/24/11     Ovarian cancer (H) 12/09     Stage IC grade 3 ovarian cancer     Hypertension      Hyperlipidemia      Pulmonary nodules      Shortness of breath      Renal disease      insuffiency     Gastro-oesophageal reflux disease      Anxiety      Depression        Past Surgical History   Procedure Laterality Date     Vulva surgery  3/24/11     VAIN III     Hysterectomy radical  12/09     Stage IC grade 3 ovarian cancer,     Thoracoscopic wedge resection lung  11/16/2011     Procedure:THORACOSCOPIC WEDGE  RESECTION LUNG; Left Thoracoscopic  Lower Lobe Wedge Resection with Pleura; Surgeon:TAMARA SERVIN; Location:[facility] OR     Biopsy vaginal  1/31/2013     Procedure: BIOPSY VAGINAL;  Colposcopy, CO2 Laser to Vagina, Upper Vaginal biopsies;  Surgeon: Marietta Sexton MD;  Location: [facility] OR     Laser co2 vagina  1/31/2013     Procedure: LASER CO2 VAGINA;;  Surgeon: Marietta Sexton MD;  Location: [facility] OR       Current Outpatient Prescriptions   Medication     albuterol (PROAIR HFA/PROVENTIL HFA/VENTOLIN HFA) 108 (90 BASE) MCG/ACT Inhaler     warfarin (COUMADIN) 2.5 MG tablet     LORazepam (ATIVAN) 1 MG tablet     HYDROmorphone (DILAUDID) 2 MG tablet     oxyCODONE (OXYCONTIN) 10 MG 12 hr tablet     sertraline (ZOLOFT) 50 MG tablet     sodium chloride (OCEAN) 0.65 % nasal spray     umeclidinium-vilanterol (ANORO ELLIPTA) 62.5-25 MCG/INH oral inhaler     albuterol (ALBUTEROL) 108 (90 BASE) MCG/ACT Inhaler     LORazepam (ATIVAN) 0.5 MG tablet     prochlorperazine (COMPAZINE) 5 MG tablet     senna (SENOKOT) 8.6 MG tablet     order for DME     warfarin (COUMADIN) 5 MG tablet     ondansetron (ZOFRAN) 8 MG tablet     omeprazole (PRILOSEC) 20 MG capsule     order for DME     prochlorperazine (COMPAZINE) 10 MG tablet     ipratropium - albuterol 0.5 mg/2.5 mg/3 mL (DUONEB) 0.5-2.5 (3) MG/3ML nebulization     DiphenhydrAMINE HCl (BENADRYL PO)     No current facility-administered medications for this visit.          Allergies   Allergen Reactions     Carboplatin Difficulty breathing     Reaction to Carboplatin on 8th dose.        Family History   Problem Relation Age of Onset     Cancer - colorectal Paternal Grandmother      CANCER Paternal Aunt      stomach     C.A.D. Father      Hypertension Mother      DIABETES Mother        Social History     Social History     Marital Status:      Spouse Name: N/A     Number of Children: N/A     Years of Education: N/A     Occupational History     Not on file.     Social History Main Topics      Smoking status: Former Smoker     Smokeless tobacco: Never Used      Comment: Patient hasn't smoked for over 5 years 04/23/15     Alcohol Use: No      Comment: no alcohol use     Drug Use: No     Sexual Activity: No     Other Topics Concern     Not on file     Social History Narrative       Review of Systems    Answers for HPI/ROS submitted by the patient on 1/24/2017   General Symptoms: Yes  Skin Symptoms: No  HENT Symptoms: Yes  EYE SYMPTOMS: No  HEART SYMPTOMS: Yes  LUNG SYMPTOMS: Yes  INTESTINAL SYMPTOMS: Yes  URINARY SYMPTOMS: No  GYNECOLOGIC SYMPTOMS: No  BREAST SYMPTOMS: No  SKELETAL SYMPTOMS: No  BLOOD SYMPTOMS: No  NERVOUS SYSTEM SYMPTOMS: No  MENTAL HEALTH SYMPTOMS: Yes  Fever: No  Loss of appetite: Yes  Weight loss: No  Weight gain: No  Fatigue: Yes  Night sweats: No  Chills: No  Increased stress: Yes  Excessive hunger: No  Excessive thirst: No  Feeling hot or cold when others believe the temperature is normal: No  Loss of height: No  Post-operative complications: No  Surgical site pain: No  Hallucinations: No  Change in or Loss of Energy: Yes  Hyperactivity: No  Confusion: Yes  Ear pain: No  Ear discharge: No  Hearing loss: No  Tinnitus: Yes  Nosebleeds: Yes  Congestion: Yes  Sinus pain: No  Trouble swallowing: No   Voice hoarseness: No  Mouth sores: Yes  Sore throat: No  Tooth pain: No  Gum tenderness: No  Bleeding gums: No  Change in taste: No  Change in sense of smell: No  Dry mouth: No  Hearing aid used: No  Neck lump: No  Cough: Yes  Sputum or phlegm: Yes  Coughing up blood: No  Difficulty breating or shortness of breath: Yes  Snoring: No  Wheezing: Yes  Difficulty breathing on exertion: Yes  Respiratory pain: No  Nighttime Cough: Yes  Difficulty breathing when lying flat: Yes  Chest pain or pressure: No  Fast or irregular heartbeat: No  Pain in legs with walking: No  Swelling in feet or ankles: Yes  Trouble breathing while lying down: Yes  Fingers or Toes appear blue: No  High blood pressure:  "No  Low blood pressure: No  Fainting: No  Murmurs: No  Chest pain on exertion: No  Chest pain at rest: No  Cramping pain in leg during exercise: No  Pacemaker: No  Varicose veins: No  Edema or swelling: No  Fast heart beat: Yes  Wake up at night with shortness of breath: Yes  Heart flutters: No  Light-headedness: Yes  Exercise intolerance: Yes  Heart burn or indigestion: Yes  Nausea: Yes  Vomiting: Yes  Abdominal pain: No  Bloating: No  Constipation: No  Diarrhea: No  Blood in stool: No  Black stools: No  Rectal or Anal pain: No  Fecal incontinence: No  Rectal bleeding: No  Yellowing of skin or eyes: No  Vomit with blood: No  Change in stools: No  Hemorrhoids: No  Nervous or Anxious: Yes  Depression: Yes  Trouble sleeping: Yes  Trouble thinking or concentrating: Yes  Mood changes: Yes  Panic attacks: Yes    I have reviewed and addressed the patient's review of symptoms for today's visit.         Physical Exam:    /86 mmHg  Pulse 101  Temp(Src) 98  F (36.7  C) (Oral)  Resp 18  Ht 1.575 m (5' 2\")  Wt 79.243 kg (174 lb 11.2 oz)  BMI 31.94 kg/m2  SpO2 93%    CONSTITUTIONAL: Alert non-toxic appearing female in no acute distress  HEAD: Normocephalic, atraumatic  EYES: PERRLA; no scleral icterus  ENT: Oropharynx pink without lesions, lips cracked at corners  NECK: Neck supple without lymphadenopathy  RESPIRATORY: Lungs clear to auscultation, no increased work of breathing noted  CV: Tachycardic with regular rhythm, S1S2, no clicks, murmurs, rubs, or gallops; bilateral lower extremities with trace edema, dorsalis pedis pulses 2+ bilaterally  GASTROINTESTINAL: Normoactive bowel sounds x4 quadrants, abdomen soft, non-distended, and non-tender to palpation without masses or organomegaly  GENITOURINARY: Not indicated  LYMPHATIC: Cervical, supraclavicular, and inguinal nodes without lymphadenopathy  MUSCULOSKELETAL: Moves all extremities, no obvious muscle wasting  NEUROLOGIC: No gross deficits, slow gait but steady " using cane  SKIN: Appropriate color for race, warm and dry, no rashes or lesions to unclothed skin  PSYCHIATRIC: Pleasant and interactive, affect bright, makes appropriate eye contact, thought process linear    Labs:        Date Value Ref Range Status   12/28/2016 44* 0 - 30 U/mL Final     Comment:     Assay Method:  Chemiluminescence using Siemens Centaur XP   11/09/2016 143* 0 - 30 U/mL Final     Comment:     Assay Method:  Chemiluminescence using Siemens Centaur XP   10/27/2016 99* 0 - 30 U/mL Final     Comment:     Assay Method:  Chemiluminescence using Siemens Centaur XP   09/30/2016 77* 0 - 30 U/mL Final     Comment:     Assay Method:  Chemiluminescence using Siemens Centaur XP   04/11/2016 32* 0 - 30 U/mL Final   03/14/2016 34* 0 - 30 U/mL Final   02/10/2016 30 0 - 30 U/mL Final   11/12/2015 32* 0 - 30 U/mL Final   09/17/2015 32* 0 - 30 U/mL Final   07/23/2015 26 0 - 30 U/mL Final           Assessment/Plan:   1) Recurrent metastatic ovarian cancer: Proceed with cycle two of weekly Taxol with every 2 weeks Avastin, cycle 2 Day 36. Plan 3 cycles followed by imaging and treatment planning with Dr. Markham. Creatinine elevated, however, this appears to be per her recent baseline. Discussed epistaxis and when to seek emergency care for this- saline nasal spray ordered. Walker ordered for the times when she is more short of breath or weak. To continue to see palliative care. Reviewed signs and symptoms for when she should contact the clinic or seek additional care, including but not limited to fever, chills, inability to keep down food or fluids, nausea and vomiting not controlled with antiemetics, and diarrhea leading to dehydration. Patient to contact the clinic with any questions or concerns in the interim. Discussed with patient that she never needs to proceed with chemotherapy if she does not want to and if she ever wants to switch to hospice she may. CXR today.  2) Anxiety: Reports mild to moderate  improvement in anxiety. Zoloft dose increased to 50mg PO daily.  3) Health maintenance issues discussed include to follow up with PCP for non-gynecologic concerns and co-morbid conditions. To follow up with Coumadin clinic regarding her INR.  4) Patient verbalized understanding of and agreement with plan    A total of 30 minutes of face to face time were spent with the patient with over 50% of that time spent in counseling, coordination of care, education, and symptom management.    Thank you for allowing me to participate in the care of this patient.  If you have any questions or concerns, please do not hesitate to contact me.    Erica Markham MD    Department of Ob/Gyn and Women's Health  Division of Gynecologic Oncology  [facility]  [phone]

## 2017-01-26 NOTE — MR AVS SNAPSHOT
After Visit Summary   1/26/2017    Etelvina Jacobs    MRN: [id number]           Patient Information     Date Of Birth          1950        Visit Information        Provider Department      1/26/2017 2:20 PM Erica Markham MD [facility]        Today's Diagnoses     Ovarian cancer, left (H)    -  1    Malignant neoplasm metastatic to lung, unspecified laterality (H)           Follow-ups after your visit        Your next 10 appointments already scheduled     Feb 15, 2017  7:30 AM CST   [facility] Draw with [facility] DRAW   [facility]    [address] [place] [id number]   [phone]            Feb 15, 2017  8:00 AM CST   (Arrive by 7:45 AM)   Return Active Treatment with LEANDRA Billy CNP   [facility] ([facility])    [address] [place] [address]   [phone]            Feb 15, 2017  9:00 AM CST   Infusion 180 with UC ONCOLOGY INFUSION, UC 24 ATC   [facility] ([facility])    [address] [place] [address]   [phone]            Feb 22, 2017  9:30 AM CST   [facility] Lab Draw with [facility] LAB DRAW   [facility] Lab Draw ([facility])    [address] [place] [address]   [phone]            Feb 22, 2017 10:00 AM CST   Infusion 120 with UC ONCOLOGY INFUSION, UC 28 ATC   [facility] ([facility])    [address] [place] [address]   [phone]            Mar 01, 2017 10:00 AM CST   [facility] Lab Draw with [facility] LAB DRAW   [facility]    [address] [place] [id number]   [phone]            Mar 01, 2017 10:30 AM CST  "  Infusion 180 with UC ONCOLOGY INFUSION, UC 22 ATC   [facility] ([facility])    [address]  [address]  [facility] [address] [phone]              Who to contact     If you have questions or need follow up information about today's clinic visit or your schedule please contact [facility] directly at [phone].  Normal or non-critical lab and imaging results will be communicated to you by Snapvinehart, letter or phone within 4 business days after the clinic has received the results. If you do not hear from us within 7 days, please contact the clinic through Snapvinehart or phone. If you have a critical or abnormal lab result, we will notify you by phone as soon as possible.  Submit refill requests through avox or call your pharmacy and they will forward the refill request to us. Please allow 3 business days for your refill to be completed.          Additional Information About Your Visit        SnapvineharSenionLab Information     avox gives you secure access to your electronic health record. If you see a primary care provider, you can also send messages to your care team and make appointments. If you have questions, please call your primary care clinic.  If you do not have a primary care provider, please call [phone] and they will assist you.        Care EveryWhere ID     This is your Care EveryWhere ID. This could be used by other organizations to access your [place] medical records  [id number]        Your Vitals Were     Pulse Temperature Respirations Height Pulse Oximetry BMI (Body Mass Index)    101 98  F (36.7  C) (Oral) 18 1.575 m (5' 2\") 93% 31.95 kg/m2       Blood Pressure from Last 3 Encounters:   02/07/17 119/74   02/01/17 130/69   01/26/17 123/86    Weight from Last 3 Encounters:   02/07/17 78.2 kg (172 lb 6.4 oz)   02/01/17 78.9 kg (174 lb)   01/26/17 79.2 kg (174 lb 11.2 oz)               Primary Care Provider Office Phone # " Fax #    [facility] [phone] [address] [facility] [address]        Thank you!     Thank you for choosing [facility]  for your care. Our goal is always to provide you with excellent care. Hearing back from our patients is one way we can continue to improve our services. Please take a few minutes to complete the written survey that you may receive in the mail after your visit with us. Thank you!             Your Updated Medication List - Protect others around you: Learn how to safely use, store and throw away your medicines at www.disposemymeds.org.          This list is accurate as of: 1/26/17 11:59 PM.  Always use your most recent med list.                   Brand Name Dispense Instructions for use    * albuterol 108 (90 BASE) MCG/ACT Inhaler    PROAIR HFA/PROVENTIL HFA/VENTOLIN HFA     Inhale 2 puffs into the lungs       * albuterol 108 (90 BASE) MCG/ACT Inhaler    albuterol    1 Inhaler    Inhale 2 puffs into the lungs every 6 hours       BENADRYL PO      Take 50 mg by mouth daily as needed       HYDROmorphone 2 MG tablet    DILAUDID    200 tablet    Take 1-2 tablets (2-4 mg) by mouth every 3 hours as needed (dyspnea and??/ or cough)       ipratropium - albuterol 0.5 mg/2.5 mg/3 mL 0.5-2.5 (3) MG/3ML neb solution    DUONEB    3 mL    Take 1 vial (3 mLs) by nebulization once for 1 dose In clinic neb       * LORazepam 0.5 MG tablet    ATIVAN    30 tablet    Take 1 tablet (0.5 mg) by mouth every 6 hours as needed (Anxiety, Nausea/Vomiting or Sleep)       * LORazepam 1 MG tablet    ATIVAN    30 tablet    Take 1 tablet (1 mg) by mouth every 6 hours as needed (Anxiety, Nausea/Vomiting or Sleep)       omeprazole 20 MG CR capsule    priLOSEC    90 capsule    Take 2 capsules (40 mg) by mouth daily       ondansetron 8 MG tablet    ZOFRAN    30 tablet    Take 1 tablet (8 mg) by mouth every 8 hours as needed for nausea       * order for DME     1 Device     Equipment being ordered: Oxygen 1-2 L NC Keep O2 sats > 90%. RA sats 88%       * order for DME     1 Device    Equipment being ordered: Oxygen 3 liters of oxygen continuous to keep sats >90%. RA sats 88%.       * prochlorperazine 10 MG tablet    COMPAZINE    30 tablet    Take 1 tablet (10 mg) by mouth every 6 hours as needed (nausea/vomiting)       * prochlorperazine 5 MG tablet    COMPAZINE    30 tablet    Take 1 tablet (5 mg) by mouth every 6 hours as needed (Nausea/Vomiting)       senna 8.6 MG tablet    SENOKOT    120 tablet    Take 1-2 tablets by mouth 2 times daily as needed for constipation       sertraline 50 MG tablet    ZOLOFT    30 tablet    Take 1 tablet (50 mg) by mouth daily       sodium chloride 0.65 % nasal spray    OCEAN    88 mL    Spray 1 spray into both nostrils every 2 hours as needed for congestion       umeclidinium-vilanterol 62.5-25 MCG/INH oral inhaler    ANORO ELLIPTA    1 Inhaler    Inhale 1 puff into the lungs daily as needed Alternate with duoneb q6h Therapeutic interchange for Combivent Inhaler.       * warfarin 5 MG tablet    COUMADIN    72 tablet    Take 2.5 mg on Tues, Thurs, Sun and 5 mg Mon, Wed, Fri, Sat or as directed by anticoagulation clinic.       * warfarin 2.5 MG tablet    COUMADIN    60 tablet    Take one to two tablets daily or as directed by the Coumadin clinic       * Notice:  This list has 10 medication(s) that are the same as other medications prescribed for you. Read the directions carefully, and ask your doctor or other care provider to review them with you.

## 2017-01-27 NOTE — PROGRESS NOTES
Care Coordinator Note  Plan continue current regimen through cycle #3, then follow up with Dr. Markham with a CT.  Patient states she feels Zoloft has helped, she is not having panic episodes as before.  She is enjoying coloring as winter activity.  Discussed nutritional supplements, as [place] Instant Breakfast, especially on days she does not feel like eating.      Pt verbalized back understanding of the above information discussed.     Ciera MERA, RN  Care Coordinator  Gynecologic Cancer   Office:  [phone]  Pager: [phone] #[phone]

## 2017-02-01 NOTE — MR AVS SNAPSHOT
After Visit Summary   2/1/2017    Etelvina Jacobs    MRN: [id number]           Patient Information     Date Of Birth          1950        Visit Information        Provider Department      2/1/2017 10:30 AM UC 30 ATC; UC ONCOLOGY INFUSION [facility]        Today's Diagnoses     Ovarian cancer, left (H)    -  1     Encounter for long-term current use of medication         Deep vein thrombosis (DVT) (H)           Care Instructions    Contact Numbers    [facility] [place]: [phone]  [facility] [place] Triage:  [phone]    Call triage with chills and/or temperature greater than or equal to 100.5, uncontrolled nausea/vomiting, diarrhea, constipation, dizziness, shortness of breath, chest pain, bleeding, unexplained bruising, or any new/concerning symptoms, questions/concerns.     If you are having any concerning symptoms or wish to speak to a provider before your next infusion visit, please call your care coordinator or triage to notify them so we can adequately serve you.       After Hours: [phone]    If after hours, weekends, or holidays, call main hospital  and ask for Oncology doctor on call.         February 2017 Sunday Monday Tuesday Wednesday Thursday Friday Saturday                  1     [facility]   10:00 AM   (15 min.)   [facility]   [facility] Lab Draw     UMP ONC INFUSION 120   10:30 AM   (120 min.)    ONCOLOGY INFUSION   [facility] 2     3     4       5     6     7     UMP RETURN    1:00 PM   (30 min.)   Darlene Peck MD   [facility] 8     9     10     11       12     13     14     15     [facility]RAW    7:30 AM   (15 min.)   [facility] DRAW   [facility] Lab Draw     UMP RETURN ACTIVE TREATMENT    8:00 AM   (40 min.)   Valentina Haney APRN CNP   [facility]     UMP ONC INFUSION 180    9:00 AM   (180 min.)   UC ONCOLOGY INFUSION   [facility] 16     17     18       19     20     21     22     [facility] DRAW    9:30 AM   (15 min.)    [facility] LAB DRAW   [facility] Lab Draw     UMP ONC INFUSION 120   10:00 AM   (120 min.)    ONCOLOGY INFUSION   [facility] 23     24     25       26     27     28 March 2017 Sunday Monday Tuesday Wednesday Thursday Friday Saturday                  1     [facility] DRAW   10:00 AM   (15 min.)    [facility] LAB DRAW   [facility] Lab Draw     UMP ONC INFUSION 180   10:30 AM   (180 min.)    ONCOLOGY INFUSION   [facility] 2     3     4       5     6     7     8     [facility] DRAW    9:30 AM   (15 min.)    [facility] LAB DRAW   [facility] Lab Draw     UMP ONC INFUSION 120   10:00 AM   (120 min.)    ONCOLOGY INFUSION   [facility] 9     10     11       12     13     14     15     [facility] DRAW   10:00 AM   (15 min.)    [facility]RAW   [facility]     UMP ONC INFUSION 180   10:30 AM   (180 min.)    ONCOLOGY INFUSION   [facility] 16     17     18       19     20     21     22     [facility] DRAW   10:00 AM   (15 min.)    [facility] DRAW   [facility]     UMP ONC INFUSION 120   10:30 AM   (120 min.)    ONCOLOGY INFUSION   [facility] 23     24     25       26     27     28     29     30     31                       Lab Results:  Recent Results (from the past 12 hour(s))   CBC with platelets differential    Collection Time: 02/01/17 10:03 AM   Result Value Ref Range    WBC 4.3 4.0 - 11.0 10e9/L    RBC Count 3.56 (L) 3.8 - 5.2 10e12/L    Hemoglobin 9.2 (L) 11.7 - 15.7 g/dL    Hematocrit 30.5 (L) 35.0 - 47.0 %    MCV 86 78 - 100 fl    MCH 25.8 (L) 26.5 - 33.0 pg    MCHC 30.2 (L) 31.5 - 36.5 g/dL    RDW 26.1 (H) 10.0 - 15.0 %    Platelet Count 291 150 - 450 10e9/L    Diff Method Automated Method     %  Neutrophils 63.3 %    % Lymphocytes 23.9 %    % Monocytes 9.2 %    % Eosinophils 2.6 %    % Basophils 0.5 %    % Immature Granulocytes 0.5 %    Nucleated RBCs 0 0 /100    Absolute Neutrophil 2.7 1.6 - 8.3 10e9/L    Absolute Lymphocytes 1.0 0.8 - 5.3 10e9/L    Absolute Monocytes 0.4 0.0 - 1.3 10e9/L    Absolute Eosinophils 0.1 0.0 - 0.7 10e9/L    Absolute Basophils 0.0 0.0 - 0.2 10e9/L    Abs Immature Granulocytes 0.0 0 - 0.4 10e9/L    Absolute Nucleated RBC 0.0    Magnesium    Collection Time: 02/01/17 10:03 AM   Result Value Ref Range    Magnesium 2.0 1.6 - 2.3 mg/dL   Potassium    Collection Time: 02/01/17 10:03 AM   Result Value Ref Range    Potassium 4.0 3.4 - 5.3 mmol/L   INR    Collection Time: 02/01/17 10:03 AM   Result Value Ref Range    INR 1.90 (H) 0.86 - 1.14               Follow-ups after your visit        Your next 10 appointments already scheduled     Feb 07, 2017  1:00 PM   (Arrive by 12:45 PM)   Return Visit with Darlene Peck MD   [facility])    [address] [place] [id number] [phone]            Feb 15, 2017  7:30 AM   [facility] Lab Draw with [facility] DRAW   [facility] Lab Draw ([facility])    [address] [place] [id number] [phone]            Feb 15, 2017  8:00 AM   (Arrive by 7:45 AM)   Return Active Treatment with LEANDRA Billy CNP   [facility] ([facility])    [address] [place] [id number] [phone]            Feb 15, 2017  9:00 AM   Infusion 180 with UC ONCOLOGY INFUSION, UC 24 ATC   [facility] ([facility])    [address] [id number] [phone]            Feb 22, 2017  9:30 AM   [facility] Lab Draw with  M[facility]IC LAB DRAW   [facility] Lab Draw ([facility])    [address] [place] [phone]   [phone]            Feb 22, 2017 10:00 AM   Infusion 120 with UC ONCOLOGY INFUSION, UC 28 ATC   [facility] ([facility])    [address] [place] [id number]   [phone]            Mar 01, 2017 10:00 AM   [facility] Lab Draw with [facility] LAB DRAW   [facility] Lab Draw ([facility])    [address] [place] [address]   [phone]            Mar 01, 2017 10:30 AM   Infusion 180 with UC ONCOLOGY INFUSION   [facility] ([facility])    [address] [place] [id number] [phone]              Who to contact     If you have questions or need follow up information about today's clinic visit or your schedule please contact [facility] directly at [phone].  Normal or non-critical lab and imaging results will be communicated to you by Jetpachart, letter or phone within 4 business days after the clinic has received the results. If you do not hear from us within 7 days, please contact the clinic through Optensity or phone. If you have a critical or abnormal lab result, we will notify you by phone as soon as possible.  Submit refill requests through Optensity or call your pharmacy and they will forward the refill request to us. Please allow 3 business days for your refill to be completed.          Additional Information About Your Visit        JetpacharPaytopia Information     Optensity gives you secure access to your electronic health record. If you see a primary care provider, you can also send messages to your care team and make appointments. If you have questions, please call your primary care clinic.  If you do not have a primary care provider, please call [phone] and they will assist you.        Care EveryWhere ID      This is your Care EveryWhere ID. This could be used by other organizations to access your [place] medical records  [id number]        Your Vitals Were     Pulse Temperature Respirations Pulse Oximetry          106 98.2  F (36.8  C) 18 95%         Blood Pressure from Last 3 Encounters:   02/01/17 130/69   01/26/17 123/86   01/25/17 124/80    Weight from Last 3 Encounters:   02/01/17 78.926 kg (174 lb)   01/26/17 79.243 kg (174 lb 11.2 oz)   01/25/17 79.243 kg (174 lb 11.2 oz)              We Performed the Following     CBC with platelets differential     INR     Magnesium     Potassium     Treatment Conditions        Primary Care Provider Office Phone # Fax #    [facility] [phone] [address] [facility] [address]        Thank you!     Thank you for choosing [facility]  for your care. Our goal is always to provide you with excellent care. Hearing back from our patients is one way we can continue to improve our services. Please take a few minutes to complete the written survey that you may receive in the mail after your visit with us. Thank you!             Your Updated Medication List - Protect others around you: Learn how to safely use, store and throw away your medicines at www.disposemymeds.org.          This list is accurate as of: 2/1/17 11:26 AM.  Always use your most recent med list.                   Brand Name Dispense Instructions for use    * albuterol 108 (90 BASE) MCG/ACT Inhaler    PROAIR HFA/PROVENTIL HFA/VENTOLIN HFA     Inhale 2 puffs into the lungs       * albuterol 108 (90 BASE) MCG/ACT Inhaler    albuterol    1 Inhaler    Inhale 2 puffs into the lungs every 6 hours       BENADRYL PO      Take 50 mg by mouth daily as needed       HYDROmorphone 2 MG tablet    DILAUDID    200 tablet    Take 1-2 tablets (2-4 mg) by mouth every 3 hours as needed (dyspnea and??/ or cough)       ipratropium - albuterol 0.5 mg/2.5 mg/3 mL 0.5-2.5 (3) MG/3ML  neb solution    DUONEB    3 mL    Take 1 vial (3 mLs) by nebulization once for 1 dose In clinic neb       * LORazepam 0.5 MG tablet    ATIVAN    30 tablet    Take 1 tablet (0.5 mg) by mouth every 6 hours as needed (Anxiety, Nausea/Vomiting or Sleep)       * LORazepam 1 MG tablet    ATIVAN    30 tablet    Take 1 tablet (1 mg) by mouth every 6 hours as needed (Anxiety, Nausea/Vomiting or Sleep)       omeprazole 20 MG CR capsule    priLOSEC    90 capsule    Take 2 capsules (40 mg) by mouth daily       ondansetron 8 MG tablet    ZOFRAN    30 tablet    Take 1 tablet (8 mg) by mouth every 8 hours as needed for nausea       * order for DME     1 Device    Equipment being ordered: Oxygen 1-2 L NC Keep O2 sats > 90%. RA sats 88%       * order for DME     1 Device    Equipment being ordered: Oxygen 3 liters of oxygen continuous to keep sats >90%. RA sats 88%.       oxyCODONE 10 MG 12 hr tablet    OXYCONTIN    60 tablet    Take one tablet around 8am and 8pm.       * prochlorperazine 10 MG tablet    COMPAZINE    30 tablet    Take 1 tablet (10 mg) by mouth every 6 hours as needed (nausea/vomiting)       * prochlorperazine 5 MG tablet    COMPAZINE    30 tablet    Take 1 tablet (5 mg) by mouth every 6 hours as needed (Nausea/Vomiting)       senna 8.6 MG tablet    SENOKOT    120 tablet    Take 1-2 tablets by mouth 2 times daily as needed for constipation       sertraline 50 MG tablet    ZOLOFT    30 tablet    Take 1 tablet (50 mg) by mouth daily       sodium chloride 0.65 % nasal spray    OCEAN    88 mL    Spray 1 spray into both nostrils every 2 hours as needed for congestion       umeclidinium-vilanterol 62.5-25 MCG/INH oral inhaler    ANORO ELLIPTA    1 Inhaler    Inhale 1 puff into the lungs daily as needed Alternate with duoneb q6h Therapeutic interchange for Combivent Inhaler.       * warfarin 5 MG tablet    COUMADIN    72 tablet    Take 2.5 mg on Tues, Thurs, Sun and 5 mg Mon, Wed, Fri, Sat or as directed by anticoagulation  clinic.       * warfarin 2.5 MG tablet    COUMADIN    60 tablet    Take one to two tablets daily or as directed by the Coumadin clinic       * Notice:  This list has 10 medication(s) that are the same as other medications prescribed for you. Read the directions carefully, and ask your doctor or other care provider to review them with you.

## 2017-02-01 NOTE — PROGRESS NOTES
Chief Complaint   Patient presents with     Port Draw     labs drawn from port by RN     Port accessed, labs drawn, flushed with NS & heparin.  Patient checked in for infusion.  Mary Dotson RN

## 2017-02-01 NOTE — PROGRESS NOTES
Infusion Nursing Note:  Etelvina Jacobs presents today for Cycle 2, day 36 Taxol.    Patient seen by provider today: No    Note: Patient reports new, mild sinus pain/pressure and runny nose.  Denies fevers.  TORB 2/1/2017 1052 Dr. Vital, via Kelvin/TA Valles RN: Ok to administer Taxol.    Intravenous Access:  Implanted Port.    Treatment Conditions:  HGB      9.2   2/1/2017  WBC      4.3   2/1/2017   ANEU      2.7   2/1/2017  PLT      291   2/1/2017     NA      140   12/28/2016                POTASSIUM      4.0   2/1/2017        MAG      2.0   2/1/2017         CR     1.36   12/28/2016                LUDWIG      8.7   12/28/2016             BILITOTAL      0.3   12/28/2016        ALBUMIN      3.0   12/28/2016                 ALT       14   12/28/2016        AST       16   12/28/2016  Results reviewed, labs MET treatment parameters, ok to proceed with treatment.    Post Infusion Assessment:  Patient tolerated infusion without incident.  Blood return noted pre and post infusion.  Site patent and intact, free from redness, edema or discomfort.  No evidence of extravasations.  Access discontinued per protocol.    Discharge Plan:   Patient declined prescription refills.  Discharge instructions reviewed with: Patient.  Patient and/or family verbalized understanding of discharge instructions and all questions answered.  Copy of AVS reviewed with patient and/or family.  Patient will return 2/15/2017 for next appointment.  Departure Mode: Ambulatory.    Katarina Valles, ODESSA

## 2017-02-01 NOTE — PATIENT INSTRUCTIONS
Contact Numbers    [facility] [place] [phone]  [facility] [place]:  [phone]    Call triage with chills and/or temperature greater than or equal to 100.5, uncontrolled nausea/vomiting, diarrhea, constipation, dizziness, shortness of breath, chest pain, bleeding, unexplained bruising, or any new/concerning symptoms, questions/concerns.     If you are having any concerning symptoms or wish to speak to a provider before your next infusion visit, please call your care coordinator or triage to notify them so we can adequately serve you.       After Hours: [phone]    If after hours, weekends, or holidays, call main hospital  and ask for Oncology doctor on call.         February 2017 Sunday Monday Tuesday Wednesday Thursday Friday Saturday                  1     [facility] DRAW   10:00 AM   (15 min.)   [facility]   [facility]     [facility] ONC INFUSION 120   10:30 AM   (120 min.)    ONCOLOGY INFUSION   [facility] 2     3     4       5     6     7     UMP RETURN    1:00 PM   (30 min.)   Darlene Peck MD   [facility] 8     9     10     11       12     13     14     15     [facility] DRAW    7:30 AM   (15 min.)   [facility] DRAW   [facility] Lab Draw     UMP RETURN ACTIVE TREATMENT    8:00 AM   (40 min.)   Valentina Haney APRN CNP   [facility]     UMP ONC INFUSION 180    9:00 AM   (180 min.)    ONCOLOGY INFUSION   [facility] 16     17     18       19     20     21     22     [facility] DRAW    9:30 AM   (15 min.)   [facility]   [facility]     UMP ONC INFUSION 120   10:00 AM   (120 min.)    ONCOLOGY INFUSION   [facility] 23     24     25       26     27     28                                    March 2017 Sunday Monday Tuesday Wednesday Thursday Friday Saturday                  1     [facility] DRAW   10:00 AM   (15 min.)   [facility]ARANYA  [facility] LAB DRAW   [facility] Lab Draw     UMP ONC INFUSION 180   10:30 AM   (180 min.)    ONCOLOGY INFUSION   [facility] 2     3     4       5     6     7     8     [facility] LAB DRAW    9:30 AM   (15 min.)    [facility] LAB DRAW   [facility] Lab Draw     UMP ONC INFUSION 120   10:00 AM   (120 min.)    ONCOLOGY INFUSION   [facility] 9     10     11       12     13     14     15     [facility] LAB DRAW   10:00 AM   (15 min.)    [facility] LAB DRAW   [facility] Lab Draw     UMP ONC INFUSION 180   10:30 AM   (180 min.)    ONCOLOGY INFUSION   [facility] 16     17     18       19     20     21     22     [facility] LAB DRAW   10:00 AM   (15 min.)    [facility]     [facility] ONC INFUSION 120   10:30 AM   (120 min.)    ONCOLOGY INFUSION   [facility] 23     24     25       26     27     28     29     30     31                       Lab Results:  Recent Results (from the past 12 hour(s))   CBC with platelets differential    Collection Time: 02/01/17 10:03 AM   Result Value Ref Range    WBC 4.3 4.0 - 11.0 10e9/L    RBC Count 3.56 (L) 3.8 - 5.2 10e12/L    Hemoglobin 9.2 (L) 11.7 - 15.7 g/dL    Hematocrit 30.5 (L) 35.0 - 47.0 %    MCV 86 78 - 100 fl    MCH 25.8 (L) 26.5 - 33.0 pg    MCHC 30.2 (L) 31.5 - 36.5 g/dL    RDW 26.1 (H) 10.0 - 15.0 %    Platelet Count 291 150 - 450 10e9/L    Diff Method Automated Method     % Neutrophils 63.3 %    % Lymphocytes 23.9 %    % Monocytes 9.2 %    % Eosinophils 2.6 %    % Basophils 0.5 %    % Immature Granulocytes 0.5 %    Nucleated RBCs 0 0 /100    Absolute Neutrophil 2.7 1.6 - 8.3 10e9/L    Absolute Lymphocytes 1.0 0.8 - 5.3 10e9/L    Absolute Monocytes 0.4 0.0 - 1.3 10e9/L    Absolute Eosinophils 0.1 0.0 - 0.7 10e9/L    Absolute Basophils 0.0 0.0 - 0.2 10e9/L    Abs Immature Granulocytes 0.0 0 - 0.4 10e9/L    Absolute Nucleated RBC 0.0    Magnesium     Collection Time: 02/01/17 10:03 AM   Result Value Ref Range    Magnesium 2.0 1.6 - 2.3 mg/dL   Potassium    Collection Time: 02/01/17 10:03 AM   Result Value Ref Range    Potassium 4.0 3.4 - 5.3 mmol/L   INR    Collection Time: 02/01/17 10:03 AM   Result Value Ref Range    INR 1.90 (H) 0.86 - 1.14

## 2017-02-07 NOTE — Clinical Note
2/7/2017     RE: Etelvina Jacobs  [address]  [place] [address]     Dear Colleague,    Thank you for referring your patient, Etelvina Jacobs, to the [facility] at [facility]. Please see a copy of my visit note below.    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  Etelvina continues with chemotherapy. Has to be in clinic weekly. Trying to get outside when she can to get fresh air. Breathing is about the same. Cough maybe little worse. If sits still then no cough but as soon as starts moving around, will get a horrible coughing spell lasting 15-20 minutes. Every time gets up from seated position, will get horrible cough that seems to be occuring more often. Tries to take cough drops, puffer and pills helping significantly.       Feels like cough comes on 30 min before supposed to take the oxycontin 10mg which is at 8am and 8pm.  With oxycontin, notices that cough better in between dilaudid doses. On average, taking dilaudid 2 tabs once a day in the middle of the day. No sided effects from the oxycontin.  At first felt maybe some increased confusion but that has gone away.     Wakes up in middle of night feeling SOB and will cough but doesn't need to take a dilaudid. Will cough up flem but if doesn't get out of bed, then cough won't persist. Really cough went slight movements. Once goes through a major coughing spell, will be able to get going and get out of the house and get to clinic.     Reading and coloring in bed. Staying in PJ all day in bed. No real appetite and if eats too much may be a coughing spell and then vomits. No nausea. Gradually losing weight  "over the past months. Has lost 25lb over past 5 months but feels like has more weight that could lose. No able to go to the grocery store. Not able to cook so eating fast food.      Little constipation: not taking anything for constipation. Most days having bowel movements    Anxiety: increased zoloft 50mg and no real change noticed by Etelvina but has felt that the zoloft has made sig difference. Enjoys coloring and reading. Talks to her sister once in a while.     Misses walking but in general mood is ok. At times will feel very low and wonders \"why me\".     Advance Directive Status:  DNR/DNI        Allergies   Allergen Reactions     Carboplatin Difficulty breathing     Reaction to Carboplatin on 8th dose.      Current Outpatient Prescriptions   Medication Sig Dispense Refill     albuterol (PROAIR HFA/PROVENTIL HFA/VENTOLIN HFA) 108 (90 BASE) MCG/ACT Inhaler Inhale 2 puffs into the lungs       warfarin (COUMADIN) 2.5 MG tablet Take one to two tablets daily or as directed by the Coumadin clinic 60 tablet 5     LORazepam (ATIVAN) 1 MG tablet Take 1 tablet (1 mg) by mouth every 6 hours as needed (Anxiety, Nausea/Vomiting or Sleep) 30 tablet 1     HYDROmorphone (DILAUDID) 2 MG tablet Take 1-2 tablets (2-4 mg) by mouth every 3 hours as needed (dyspnea and  / or cough) 200 tablet 0     oxyCODONE (OXYCONTIN) 10 MG 12 hr tablet Take one tablet around 8am and 8pm. 60 tablet 0     sertraline (ZOLOFT) 50 MG tablet Take 1 tablet (50 mg) by mouth daily 30 tablet 3     sodium chloride (OCEAN) 0.65 % nasal spray Spray 1 spray into both nostrils every 2 hours as needed for congestion 88 mL 3     umeclidinium-vilanterol (ANORO ELLIPTA) 62.5-25 MCG/INH oral inhaler Inhale 1 puff into the lungs daily as needed Alternate with duoneb q6h  Therapeutic interchange for Combivent Inhaler. 1 Inhaler 11     albuterol (ALBUTEROL) 108 (90 BASE) MCG/ACT Inhaler Inhale 2 puffs into the lungs every 6 hours 1 Inhaler 11     LORazepam (ATIVAN) 0.5 MG " tablet Take 1 tablet (0.5 mg) by mouth every 6 hours as needed (Anxiety, Nausea/Vomiting or Sleep) 30 tablet 1     prochlorperazine (COMPAZINE) 5 MG tablet Take 1 tablet (5 mg) by mouth every 6 hours as needed (Nausea/Vomiting) 30 tablet 2     senna (SENOKOT) 8.6 MG tablet Take 1-2 tablets by mouth 2 times daily as needed for constipation 120 tablet 3     order for DME Equipment being ordered: Oxygen 3 liters of oxygen continuous to keep sats >90%. RA sats 88%. 1 Device 0     warfarin (COUMADIN) 5 MG tablet Take 2.5 mg on Tues, Thurs, Sun and 5 mg Mon, Wed, Fri, Sat or as directed by anticoagulation clinic. 72 tablet 0     ipratropium - albuterol 0.5 mg/2.5 mg/3 mL (DUONEB) 0.5-2.5 (3) MG/3ML nebulization Take 1 vial (3 mLs) by nebulization once for 1 dose In clinic neb 3 mL 0     DiphenhydrAMINE HCl (BENADRYL PO) Take 50 mg by mouth daily as needed       ondansetron (ZOFRAN) 8 MG tablet Take 1 tablet (8 mg) by mouth every 8 hours as needed for nausea 30 tablet 2     omeprazole (PRILOSEC) 20 MG capsule Take 2 capsules (40 mg) by mouth daily 90 capsule 6     order for DME Equipment being ordered: Oxygen 1-2 L NC Keep O2 sats > 90%. 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) 3/24/11     Ovarian cancer (H) 12/09     Stage IC grade 3 ovarian cancer     Hypertension      Hyperlipidemia      Pulmonary nodules      Shortness of breath      Renal disease      insuffiency     Gastro-oesophageal reflux disease      Anxiety      Depression      Past Surgical History   Procedure Laterality Date     Vulva surgery  3/24/11     VAIN III     Hysterectomy radical  12/09     Stage IC grade 3 ovarian cancer,     Thoracoscopic wedge resection lung  11/16/2011     Procedure:THORACOSCOPIC WEDGE RESECTION LUNG; Left Thoracoscopic   "Lower Lobe Wedge Resection with Pleura; Surgeon:TAMARA SERVIN; Location:[facility] OR     Biopsy vaginal  1/31/2013     Procedure: BIOPSY VAGINAL;  Colposcopy, CO2 Laser to Vagina, Upper Vaginal biopsies;  Surgeon: Marietta Sexton MD;  Location: [facility] OR     Laser co2 vagina  1/31/2013     Procedure: LASER CO2 VAGINA;;  Surgeon: Marietta Sexton MD;  Location: [facility] OR           /74 mmHg  Pulse 119  Temp(Src) 98  F (36.7  C) (Oral)  Resp 16  Ht 1.575 m (5' 2\")  Wt 78.2 kg (172 lb 6.4 oz)  BMI 31.52 kg/m2  SpO2 93%    General: tired appearing, slightly pale, well groomed, sitting in chair, in NAD  Eyes: EOMI, sclera clear  HEENT: NC/AT; mucous membranes moist  Lungs: no real increased work of breathing when talking  Neuro: A&O x 3; CN II-XII grossly intact;    Neuropsych: alert, affect slightly slow, good eye contact, engaged, sensorium intact.      Key Data Reviewed:  reviewed      Impression:      Etelvina is a 66 yr old female with recurrent metastatic ovarian cancer: She is getting palliative chemotherapy with taxol and avastin with hope that it can help her feel better and give her more time. Cough continues to worsen and now present with all small movements.     Recommendations & Counseling:    Cough:   - increase Oxycontin 10mg TID  - continue hydromorphone 2-4 mg every 3 hours as needed for cough and shortness of breath.   - start decadron 2mg every morning.     Anxiety / depression   - continue zoloft 50mg daily  - lorazepam 1 tablet as needed twice a day for nausea     Constipation:   - senna 1 tablet twice a day          Thank you for involving us in the patient's care. 30 minutes face time over half spent counseling.  Darlene Peck MD / Palliative Medicine / Pager [phone] / After-Hours Answering Service [phone] / Main Palliative Clinic - [phone] / [facility] Inpatient Team Consult Pager [phone] (answered 8am-430pm M-F)    Answers for HPI/ROS submitted by the patient on 1/24/2017 "   General Symptoms: Yes  Skin Symptoms: No  HENT Symptoms: Yes  EYE SYMPTOMS: No  HEART SYMPTOMS: Yes  LUNG SYMPTOMS: Yes  INTESTINAL SYMPTOMS: Yes  URINARY SYMPTOMS: No  GYNECOLOGIC SYMPTOMS: No  BREAST SYMPTOMS: No  SKELETAL SYMPTOMS: No  BLOOD SYMPTOMS: No  NERVOUS SYSTEM SYMPTOMS: No  MENTAL HEALTH SYMPTOMS: Yes  Fever: No  Loss of appetite: Yes  Weight loss: No  Weight gain: No  Fatigue: Yes  Night sweats: No  Chills: No  Increased stress: Yes  Excessive hunger: No  Excessive thirst: No  Feeling hot or cold when others believe the temperature is normal: No  Loss of height: No  Post-operative complications: No  Surgical site pain: No  Hallucinations: No  Change in or Loss of Energy: Yes  Hyperactivity: No  Confusion: Yes  Ear pain: No  Ear discharge: No  Hearing loss: No  Tinnitus: Yes  Nosebleeds: Yes  Congestion: Yes  Sinus pain: No  Trouble swallowing: No   Voice hoarseness: No  Mouth sores: Yes  Sore throat: No  Tooth pain: No  Gum tenderness: No  Bleeding gums: No  Change in taste: No  Change in sense of smell: No  Dry mouth: No  Hearing aid used: No  Neck lump: No  Cough: Yes  Sputum or phlegm: Yes  Coughing up blood: No  Difficulty breating or shortness of breath: Yes  Snoring: No  Wheezing: Yes  Difficulty breathing on exertion: Yes  Respiratory pain: No  Nighttime Cough: Yes  Difficulty breathing when lying flat: Yes  Chest pain or pressure: No  Fast or irregular heartbeat: No  Pain in legs with walking: No  Swelling in feet or ankles: Yes  Trouble breathing while lying down: Yes  Fingers or Toes appear blue: No  High blood pressure: No  Low blood pressure: No  Fainting: No  Murmurs: No  Chest pain on exertion: No  Chest pain at rest: No  Cramping pain in leg during exercise: No  Pacemaker: No  Varicose veins: No  Edema or swelling: No  Fast heart beat: Yes  Wake up at night with shortness of breath: Yes  Heart flutters: No  Light-headedness: Yes  Exercise intolerance: Yes  Heart burn or indigestion:  Yes  Nausea: Yes  Vomiting: Yes  Abdominal pain: No  Bloating: No  Constipation: No  Diarrhea: No  Blood in stool: No  Black stools: No  Rectal or Anal pain: No  Fecal incontinence: No  Rectal bleeding: No  Yellowing of skin or eyes: No  Vomit with blood: No  Change in stools: No  Hemorrhoids: No  Nervous or Anxious: Yes  Depression: Yes  Trouble sleeping: Yes  Trouble thinking or concentrating: Yes  Mood changes: Yes  Panic attacks: Yes    Again, thank you for allowing me to participate in the care of your patient.      Sincerely,    Darlene Peck MD

## 2017-02-07 NOTE — MR AVS SNAPSHOT
After Visit Summary   2/7/2017    Etelvina Jacobs    MRN: [id number]           Patient Information     Date Of Birth          1950        Visit Information        Provider Department      2/7/2017 1:00 PM Darlene Peck MD [facility]        Today's Diagnoses     Ovarian cancer, unspecified laterality (H)    -  1     Malignant neoplasm metastatic to both lungs (H)         Shortness of breath         Cough           Care Instructions    Recommendations:  Cough and Shortness of breath:     - increase Oxycontin 10mg first thing in the morning, middle of the day and right before bed.   - continue hydromorphone 2-4 mg every 3 hours as needed for cough and shortness of breath.   - start decadron 2mg every morning.     Anxiety / depression   - continue zoloft 50mg daily  - lorazepam 1 tablet as needed twice a day for anxiety and / or nausea     Constipation:   - senna 1 tablet twice a day    Constipation prevention:   - Take senna: 1-2 tablets twice a day. May increase up to 4 tablets twice a day if needed for a daily bowel movement and may need to decrease if you are having diarrhea or loose bowel movements.     Follow up:    One week by phone  In clinic in 4-6 weeks    Reasons to Call    If you are having worsening/uncontrolled symptoms we want you to call!    You or your other physicians make any changes to medications we have prescribed.      Important Phone Numbers    Celi Lamas. Brenda SAXENA. Answered 8 am to 4 pm Tuesday - Friday.    Appointment Line.[phone]     After hours. Will connect you with on call MD. [phone]      Darlene Peck MD  Palliative Care Physician  Clinic Number [phone]                    Follow-ups after your visit        Your next 10 appointments already scheduled     Feb 15, 2017  7:30 AM   [facility] Lab Draw with  [facility] LAB DRAW   [facility] Lab Draw ([facility])    [address] [place] [address]  [facility] [address]   [phone]            Feb 15, 2017  8:00 AM   (Arrive by 7:45 AM)   Return Active Treatment with LEANDRA Billy CNP   [facility] ([facility])    [address] [place] [address]   [phone]            Feb 15, 2017  9:00 AM   Infusion 180 with UC ONCOLOGY INFUSION, UC 24 ATC   [facility] ([facility])    [address] [place] [address]   [phone]            Feb 22, 2017  9:30 AM   [facility]ic Lab Draw with UC MASONIC LAB DRAW   [facility] Lab Draw ([facility])    [address] [place] [address]   [phone]            Feb 22, 2017 10:00 AM   Infusion 120 with UC ONCOLOGY INFUSION, UC 28 ATC   [facility] ([facility])    [address] [place] [address]   [phone]            Mar 01, 2017 10:00 AM   [facility]ic Lab Draw with UC MASONIC LAB DRAW   [facility] Lab Draw ([facility])    [address] [place] [address]   [phone]            Mar 01, 2017 10:30 AM   Infusion 180 with UC ONCOLOGY INFUSION, UC 22 ATC   [facility] ([facility])    [address] [place] [address]   [phone]              Who to contact     If you have questions or need follow up information about today's clinic visit or your schedule please contact [facility] directly at [phone].  Normal or non-critical lab and imaging results will be communicated to you by MyChart, letter or phone within 4 business days after the clinic has received the results. If you do not hear from us within 7 days, please contact the clinic through MyChart or phone. If you have a critical or abnormal  "lab result, we will notify you by phone as soon as possible.  Submit refill requests through [employer] or call your pharmacy and they will forward the refill request to us. Please allow 3 business days for your refill to be completed.          Additional Information About Your Visit        [facility] [facility] Information     [employer] gives you secure access to your electronic health record. If you see a primary care provider, you can also send messages to your care team and make appointments. If you have questions, please call your primary care clinic.  If you do not have a primary care provider, please call [phone] and they will assist you.        Care EveryWhere ID     This is your Care EveryWhere ID. This could be used by other organizations to access your [facility] medical records  [id number]        Your Vitals Were     Pulse Temperature Respirations Height BMI (Body Mass Index) Pulse Oximetry    119 98  F (36.7  C) (Oral) 16 1.575 m (5' 2\") 31.52 kg/m2 93%       Blood Pressure from Last 3 Encounters:   02/07/17 119/74   02/01/17 130/69   01/26/17 123/86    Weight from Last 3 Encounters:   02/07/17 78.2 kg (172 lb 6.4 oz)   02/01/17 78.926 kg (174 lb)   01/26/17 79.243 kg (174 lb 11.2 oz)              Today, you had the following     No orders found for display         Today's Medication Changes          These changes are accurate as of: 2/7/17  1:24 PM.  If you have any questions, ask your nurse or doctor.               Start taking these medicines.        Dose/Directions    dexamethasone 2 MG tablet   Commonly known as:  DECADRON   Used for:  Shortness of breath, Malignant neoplasm metastatic to both lungs (H)   Started by:  Darlene Peck MD        Dose:  2 mg   Take 1 tablet (2 mg) by mouth every morning   Quantity:  30 tablet   Refills:  0         These medicines have changed or have updated prescriptions.        Dose/Directions    oxyCODONE 10 MG 12 hr tablet   Commonly known as:  OXYCONTIN   This may have " changed:  additional instructions   Used for:  Shortness of breath, Malignant neoplasm metastatic to both lungs (H)   Changed by:  Darlene Peck MD        Take one tablet first thing in the morning, middle of the day and right before bed.   Quantity:  90 tablet   Refills:  0            Where to get your medicines      These medications were sent to [facility] - [place], [place] - [address]  [address], [facility] [address]    Hours:  TRANSPLANT PHONE NUMBER [phone] Phone:  [phone]    - dexamethasone 2 MG tablet      Some of these will need a paper prescription and others can be bought over the counter.  Ask your nurse if you have questions.     Bring a paper prescription for each of these medications    - oxyCODONE 10 MG 12 hr tablet             Primary Care Provider Office Phone # Fax #    [facility] [phone]       [address] [facility] [address]        Thank you!     Thank you for choosing [facility]  for your care. Our goal is always to provide you with excellent care. Hearing back from our patients is one way we can continue to improve our services. Please take a few minutes to complete the written survey that you may receive in the mail after your visit with us. Thank you!             Your Updated Medication List - Protect others around you: Learn how to safely use, store and throw away your medicines at www.disposemymeds.org.          This list is accurate as of: 2/7/17  1:24 PM.  Always use your most recent med list.                   Brand Name Dispense Instructions for use    * albuterol 108 (90 BASE) MCG/ACT Inhaler    PROAIR HFA/PROVENTIL HFA/VENTOLIN HFA     Inhale 2 puffs into the lungs       * albuterol 108 (90 BASE) MCG/ACT Inhaler    albuterol    1 Inhaler    Inhale 2 puffs into the lungs every 6 hours       BENADRYL PO      Take 50 mg by mouth daily as needed        dexamethasone 2 MG tablet    DECADRON    30 tablet    Take 1 tablet (2 mg) by mouth every morning       HYDROmorphone 2 MG tablet    DILAUDID    200 tablet    Take 1-2 tablets (2-4 mg) by mouth every 3 hours as needed (dyspnea and??/ or cough)       ipratropium - albuterol 0.5 mg/2.5 mg/3 mL 0.5-2.5 (3) MG/3ML neb solution    DUONEB    3 mL    Take 1 vial (3 mLs) by nebulization once for 1 dose In clinic neb       * LORazepam 0.5 MG tablet    ATIVAN    30 tablet    Take 1 tablet (0.5 mg) by mouth every 6 hours as needed (Anxiety, Nausea/Vomiting or Sleep)       * LORazepam 1 MG tablet    ATIVAN    30 tablet    Take 1 tablet (1 mg) by mouth every 6 hours as needed (Anxiety, Nausea/Vomiting or Sleep)       omeprazole 20 MG CR capsule    priLOSEC    90 capsule    Take 2 capsules (40 mg) by mouth daily       ondansetron 8 MG tablet    ZOFRAN    30 tablet    Take 1 tablet (8 mg) by mouth every 8 hours as needed for nausea       * order for DME     1 Device    Equipment being ordered: Oxygen 1-2 L NC Keep O2 sats > 90%. RA sats 88%       * order for DME     1 Device    Equipment being ordered: Oxygen 3 liters of oxygen continuous to keep sats >90%. RA sats 88%.       oxyCODONE 10 MG 12 hr tablet    OXYCONTIN    90 tablet    Take one tablet first thing in the morning, middle of the day and right before bed.       * prochlorperazine 10 MG tablet    COMPAZINE    30 tablet    Take 1 tablet (10 mg) by mouth every 6 hours as needed (nausea/vomiting)       * prochlorperazine 5 MG tablet    COMPAZINE    30 tablet    Take 1 tablet (5 mg) by mouth every 6 hours as needed (Nausea/Vomiting)       senna 8.6 MG tablet    SENOKOT    120 tablet    Take 1-2 tablets by mouth 2 times daily as needed for constipation       sertraline 50 MG tablet    ZOLOFT    30 tablet    Take 1 tablet (50 mg) by mouth daily       sodium chloride 0.65 % nasal spray    OCEAN    88 mL    Spray 1 spray into both nostrils every 2 hours as needed for congestion        umeclidinium-vilanterol 62.5-25 MCG/INH oral inhaler    ANORO ELLIPTA    1 Inhaler    Inhale 1 puff into the lungs daily as needed Alternate with duoneb q6h Therapeutic interchange for Combivent Inhaler.       * warfarin 5 MG tablet    COUMADIN    72 tablet    Take 2.5 mg on Tues, Thurs, Sun and 5 mg Mon, Wed, Fri, Sat or as directed by anticoagulation clinic.       * warfarin 2.5 MG tablet    COUMADIN    60 tablet    Take one to two tablets daily or as directed by the Coumadin clinic       * Notice:  This list has 10 medication(s) that are the same as other medications prescribed for you. Read the directions carefully, and ask your doctor or other care provider to review them with you.

## 2017-02-07 NOTE — PATIENT INSTRUCTIONS
Recommendations:  Cough and Shortness of breath:     - increase Oxycontin 10mg first thing in the morning, middle of the day and right before bed.   - continue hydromorphone 2-4 mg every 3 hours as needed for cough and shortness of breath.   - start decadron 2mg every morning.     Anxiety / depression   - continue zoloft 50mg daily  - lorazepam 1 tablet as needed twice a day for anxiety and / or nausea     Constipation:   - senna 1 tablet twice a day    Constipation prevention:   - Take senna: 1-2 tablets twice a day. May increase up to 4 tablets twice a day if needed for a daily bowel movement and may need to decrease if you are having diarrhea or loose bowel movements.     Follow up:    One week by phone  In clinic in 4-6 weeks    Reasons to Call    If you are having worsening/uncontrolled symptoms we want you to call!    You or your other physicians make any changes to medications we have prescribed.      Important Phone Numbers    Celi Lamas. Brenda SAXENA. Answered 8 am to 4 pm Tuesday - Friday.    Appointment Line.[phone]     After hours. Will connect you with on call MD. [phone]      Darlene Peck MD  Palliative Care Physician  Clinic Number [phone]

## 2017-02-07 NOTE — PROGRESS NOTES
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  Etelvina continues with chemotherapy. Has to be in clinic weekly. Trying to get outside when she can to get fresh air. Breathing is about the same. Cough maybe little worse. If sits still then no cough but as soon as starts moving around, will get a horrible coughing spell lasting 15-20 minutes. Every time gets up from seated position, will get horrible cough that seems to be occuring more often. Tries to take cough drops, puffer and pills helping significantly.       Feels like cough comes on 30 min before supposed to take the oxycontin 10mg which is at 8am and 8pm.  With oxycontin, notices that cough better in between dilaudid doses. On average, taking dilaudid 2 tabs once a day in the middle of the day. No sided effects from the oxycontin.  At first felt maybe some increased confusion but that has gone away.     Wakes up in middle of night feeling SOB and will cough but doesn't need to take a dilaudid. Will cough up flem but if doesn't get out of bed, then cough won't persist. Really cough went slight movements. Once goes through a major coughing spell, will be able to get going and get out of the house and get to clinic.     Reading and coloring in bed. Staying in PJ all day in bed. No real appetite and if eats too much may be a coughing spell and then vomits. No nausea. Gradually losing weight over the past months. Has lost 25lb over past 5 months but feels like has more weight that could lose. No able to go to the grocery store. Not able to cook so eating fast food.      Little constipation: not taking anything for constipation. Most days having bowel movements    Anxiety:  "increased zoloft 50mg and no real change noticed by Etelvina but has felt that the zoloft has made sig difference. Enjoys coloring and reading. Talks to her sister once in a while.     Misses walking but in general mood is ok. At times will feel very low and wonders \"why me\".     Advance Directive Status:  DNR/DNI        Allergies   Allergen Reactions     Carboplatin Difficulty breathing     Reaction to Carboplatin on 8th dose.      Current Outpatient Prescriptions   Medication Sig Dispense Refill     albuterol (PROAIR HFA/PROVENTIL HFA/VENTOLIN HFA) 108 (90 BASE) MCG/ACT Inhaler Inhale 2 puffs into the lungs       warfarin (COUMADIN) 2.5 MG tablet Take one to two tablets daily or as directed by the Coumadin clinic 60 tablet 5     LORazepam (ATIVAN) 1 MG tablet Take 1 tablet (1 mg) by mouth every 6 hours as needed (Anxiety, Nausea/Vomiting or Sleep) 30 tablet 1     HYDROmorphone (DILAUDID) 2 MG tablet Take 1-2 tablets (2-4 mg) by mouth every 3 hours as needed (dyspnea and  / or cough) 200 tablet 0     oxyCODONE (OXYCONTIN) 10 MG 12 hr tablet Take one tablet around 8am and 8pm. 60 tablet 0     sertraline (ZOLOFT) 50 MG tablet Take 1 tablet (50 mg) by mouth daily 30 tablet 3     sodium chloride (OCEAN) 0.65 % nasal spray Spray 1 spray into both nostrils every 2 hours as needed for congestion 88 mL 3     umeclidinium-vilanterol (ANORO ELLIPTA) 62.5-25 MCG/INH oral inhaler Inhale 1 puff into the lungs daily as needed Alternate with duoneb q6h  Therapeutic interchange for Combivent Inhaler. 1 Inhaler 11     albuterol (ALBUTEROL) 108 (90 BASE) MCG/ACT Inhaler Inhale 2 puffs into the lungs every 6 hours 1 Inhaler 11     LORazepam (ATIVAN) 0.5 MG tablet Take 1 tablet (0.5 mg) by mouth every 6 hours as needed (Anxiety, Nausea/Vomiting or Sleep) 30 tablet 1     prochlorperazine (COMPAZINE) 5 MG tablet Take 1 tablet (5 mg) by mouth every 6 hours as needed (Nausea/Vomiting) 30 tablet 2     senna (SENOKOT) 8.6 MG tablet Take 1-2 " tablets by mouth 2 times daily as needed for constipation 120 tablet 3     order for DME Equipment being ordered: Oxygen 3 liters of oxygen continuous to keep sats >90%. RA sats 88%. 1 Device 0     warfarin (COUMADIN) 5 MG tablet Take 2.5 mg on Tues, Thurs, Sun and 5 mg Mon, Wed, Fri, Sat or as directed by anticoagulation clinic. 72 tablet 0     ipratropium - albuterol 0.5 mg/2.5 mg/3 mL (DUONEB) 0.5-2.5 (3) MG/3ML nebulization Take 1 vial (3 mLs) by nebulization once for 1 dose In clinic neb 3 mL 0     DiphenhydrAMINE HCl (BENADRYL PO) Take 50 mg by mouth daily as needed       ondansetron (ZOFRAN) 8 MG tablet Take 1 tablet (8 mg) by mouth every 8 hours as needed for nausea 30 tablet 2     omeprazole (PRILOSEC) 20 MG capsule Take 2 capsules (40 mg) by mouth daily 90 capsule 6     order for DME Equipment being ordered: Oxygen 1-2 L NC Keep O2 sats > 90%. 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) 3/24/11     Ovarian cancer (H) 12/09     Stage IC grade 3 ovarian cancer     Hypertension      Hyperlipidemia      Pulmonary nodules      Shortness of breath      Renal disease      insuffiency     Gastro-oesophageal reflux disease      Anxiety      Depression      Past Surgical History   Procedure Laterality Date     Vulva surgery  3/24/11     VAIN III     Hysterectomy radical  12/09     Stage IC grade 3 ovarian cancer,     Thoracoscopic wedge resection lung  11/16/2011     Procedure:THORACOSCOPIC WEDGE RESECTION LUNG; Left Thoracoscopic  Lower Lobe Wedge Resection with Pleura; Surgeon:TAMARA SERVIN; Location:[facility] OR     Biopsy vaginal  1/31/2013     Procedure: BIOPSY VAGINAL;  Colposcopy, CO2 Laser to Vagina, Upper Vaginal biopsies;  Surgeon: Marietta Sexton MD;  Location: [facility] OR     Laser co2 vagina  1/31/2013  "    Procedure: LASER CO2 VAGINA;;  Surgeon: Marietta Sexton MD;  Location: [facility] OR           /74 mmHg  Pulse 119  Temp(Src) 98  F (36.7  C) (Oral)  Resp 16  Ht 1.575 m (5' 2\")  Wt 78.2 kg (172 lb 6.4 oz)  BMI 31.52 kg/m2  SpO2 93%    General: tired appearing, slightly pale, well groomed, sitting in chair, in NAD  Eyes: EOMI, sclera clear  HEENT: NC/AT; mucous membranes moist  Lungs: no real increased work of breathing when talking  Neuro: A&O x 3; CN II-XII grossly intact;    Neuropsych: alert, affect slightly slow, good eye contact, engaged, sensorium intact.      Key Data Reviewed:  reviewed      Impression:      Etelvina is a 66 yr old female with recurrent metastatic ovarian cancer: She is getting palliative chemotherapy with taxol and avastin with hope that it can help her feel better and give her more time. Cough continues to worsen and now present with all small movements.     Recommendations & Counseling:    Cough:   - increase Oxycontin 10mg TID  - continue hydromorphone 2-4 mg every 3 hours as needed for cough and shortness of breath.   - start decadron 2mg every morning.     Anxiety / depression   - continue zoloft 50mg daily  - lorazepam 1 tablet as needed twice a day for nausea     Constipation:   - senna 1 tablet twice a day          Thank you for involving us in the patient's care. 30 minutes face time over half spent counseling.  Darlene Peck MD / Palliative Medicine / Pager [phone] / After-Hours Answering Service [phone] / Main Palliative Clinic - [phone] / [facility] Inpatient Team Consult Pager [phone] (answered 8am-430pm M-F)    Answers for HPI/ROS submitted by the patient on 1/24/2017   General Symptoms: Yes  Skin Symptoms: No  HENT Symptoms: Yes  EYE SYMPTOMS: No  HEART SYMPTOMS: Yes  LUNG SYMPTOMS: Yes  INTESTINAL SYMPTOMS: Yes  URINARY SYMPTOMS: No  GYNECOLOGIC SYMPTOMS: No  BREAST SYMPTOMS: No  SKELETAL SYMPTOMS: No  BLOOD SYMPTOMS: No  NERVOUS SYSTEM SYMPTOMS: " No  MENTAL HEALTH SYMPTOMS: Yes  Fever: No  Loss of appetite: Yes  Weight loss: No  Weight gain: No  Fatigue: Yes  Night sweats: No  Chills: No  Increased stress: Yes  Excessive hunger: No  Excessive thirst: No  Feeling hot or cold when others believe the temperature is normal: No  Loss of height: No  Post-operative complications: No  Surgical site pain: No  Hallucinations: No  Change in or Loss of Energy: Yes  Hyperactivity: No  Confusion: Yes  Ear pain: No  Ear discharge: No  Hearing loss: No  Tinnitus: Yes  Nosebleeds: Yes  Congestion: Yes  Sinus pain: No  Trouble swallowing: No   Voice hoarseness: No  Mouth sores: Yes  Sore throat: No  Tooth pain: No  Gum tenderness: No  Bleeding gums: No  Change in taste: No  Change in sense of smell: No  Dry mouth: No  Hearing aid used: No  Neck lump: No  Cough: Yes  Sputum or phlegm: Yes  Coughing up blood: No  Difficulty breating or shortness of breath: Yes  Snoring: No  Wheezing: Yes  Difficulty breathing on exertion: Yes  Respiratory pain: No  Nighttime Cough: Yes  Difficulty breathing when lying flat: Yes  Chest pain or pressure: No  Fast or irregular heartbeat: No  Pain in legs with walking: No  Swelling in feet or ankles: Yes  Trouble breathing while lying down: Yes  Fingers or Toes appear blue: No  High blood pressure: No  Low blood pressure: No  Fainting: No  Murmurs: No  Chest pain on exertion: No  Chest pain at rest: No  Cramping pain in leg during exercise: No  Pacemaker: No  Varicose veins: No  Edema or swelling: No  Fast heart beat: Yes  Wake up at night with shortness of breath: Yes  Heart flutters: No  Light-headedness: Yes  Exercise intolerance: Yes  Heart burn or indigestion: Yes  Nausea: Yes  Vomiting: Yes  Abdominal pain: No  Bloating: No  Constipation: No  Diarrhea: No  Blood in stool: No  Black stools: No  Rectal or Anal pain: No  Fecal incontinence: No  Rectal bleeding: No  Yellowing of skin or eyes: No  Vomit with blood: No  Change in stools:  No  Hemorrhoids: No  Nervous or Anxious: Yes  Depression: Yes  Trouble sleeping: Yes  Trouble thinking or concentrating: Yes  Mood changes: Yes  Panic attacks: Yes

## 2017-02-07 NOTE — NURSING NOTE
"Etelvina Jacobs is a 66 year old female who presents for:  Chief Complaint   Patient presents with     Oncology Clinic Visit     return patient for 4-6 weeks follow up related to         Initial Vitals:  /74 mmHg  Pulse 119  Temp(Src) 98  F (36.7  C) (Oral)  Resp 16  Ht 1.575 m (5' 2\")  Wt 78.2 kg (172 lb 6.4 oz)  BMI 31.52 kg/m2  SpO2 93% Estimated body mass index is 31.52 kg/(m^2) as calculated from the following:    Height as of this encounter: 1.575 m (5' 2\").    Weight as of this encounter: 78.2 kg (172 lb 6.4 oz).. Body surface area is 1.85 meters squared. BP completed using cuff size: large  No Pain (0) No LMP recorded. Patient has had a hysterectomy. Allergies and medications reviewed.     Medications: MEDICATION REFILLS NEEDED TODAY.  Pharmacy name entered into tagga:    [facility] - [place], [place] - [address] [facility] [place], [place] - [address] [place] [address] [phone]  [facility] [id number] [place] [address] [id number] [facility] [address]    Comments: patient denied pain/discomfort. Patient has shortness of breath.     5 minutes for nursing intake (face to face time)   Geena Avila CMA          "

## 2017-02-14 NOTE — TELEPHONE ENCOUNTER
"Call Type: Triage Call    Presenting Problem: Clinic Action Needed: See note below.  Pt  returned clinic call. After hours so call rec'd by FNA.   FNA Triage  Call Pt called asking about how often to take her Decadron and  oxycontin. Pt states  increased oxycontin (10mg) to 3xday for  cough - this is reflected in note from  visit. Asked if meds are  helping cough but could not get an answer. Pt's current concern is  \"how often do I take these pills?\" Reviewed instructions w/pt. Said  she understood but sounded still confused. Encouraged her to write  it  Down and to write down time when she takes a dose to help her  keep track. States \"I get so confused\". Lives w/.  not  available to talk currently.Asked pt if he may be able to help her  w/keeping track of med doses. She said yes, he would. Advised FNA  available .  Will forward note to Dr. Peck. Angelica Glass RN/JOHNSON  Routed to: Palliative Care Coordination - [facility]  Triage Note:  Guideline Title: Medication Questions - Adult  Recommended Disposition: Provide Health Information  Original Inclination: Wanted to speak with a nurse  Override Disposition:  Intended Action: Follow Selfcare / Homecare  Physician Contacted: No  Caller has medication question(s) that was answered with available resources ?  YES  Pregnant and has medication questions regarding prescribed and/or nonprescribed  medication(s) not covered by available resources ? NO  Breastfeeding and has medication questions regarding prescribed and/or  nonprescribed medication(s) not covered by available resources ? NO  Requested information on how to safely dispose of  or unused medications ?  NO  Sign(s) or symptom(s) associated with a diagnosed condition or with a new illness  ? NO  Prescription ordered today and not available at pharmacy putting patient at  clinical risk ? NO  Recurrence of a symptom(s) or illness post prescribed medication treatment AND  provider " instructed patient to call if symptom(s) returned. ? NO  Unable to obtain prescribed medication related to available resources AND  situation poses immediate clinical risk ? NO  Pharmacy calling to clarify prescription order. ? NO  Requests refill of prescribed medication that does NOT have a valid refill; lack  of medication may cause clinical risk to patient if not available. ? NO  Has questions about prescribed and/or nonprescribed medications not covered by  available resources ? NO  Pharmacy calling with prescription question; answered per department policy. ? NO  Requests refill of prescribed medication without valid refills OR requests refill  of prescribed medication with valid refills but does not have prescription number  (no RX container); lack of medication does not put patient at clinical risk ? NO  Physician Instructions:  Care Advice:

## 2017-02-14 NOTE — TELEPHONE ENCOUNTER
"    Clinic Action Needed: See note below.  Pt returned clinic call. After hours so call rec'd by FNA.     FNA Triage Call Pt called asking about how often to take her Decadron and oxycontin. Pt states  increased oxycontin (10mg) to 3xday for cough - this is reflected in note from 2/7 visit. Asked if meds are helping cough but could not get an answer. Pt's current concern is  \"how often do I take these pills?\" Reviewed instructions w/pt. Said she understood but sounded still confused. Encouraged her to write it  Down and to write down time when she takes a dose to help her keep track. States \"I get so confused\". Lives w/.  not available to talk currently.Asked pt if he may be able to help her w/keeping track of med doses. She said yes, he would. Advised FNA available 24/7.  Will forward note to Dr. Peck. Angelica Glass RN/FNA        Routed to: Palliative Care Coordination - P       "

## 2017-02-14 NOTE — TELEPHONE ENCOUNTER
Attempted to call patient to check in regarding medication changes made last week, but was unable to reach her. Left  requesting call back when she is able.

## 2017-02-15 NOTE — NURSING NOTE
"Etelvina Jacobs is a 66 year old female who presents for:  Chief Complaint   Patient presents with     Blood Draw     Labs drawn through [facility] and vital signs checked follow up for Ovarian cancer      Oncology Clinic Visit     Ovarian CA        Initial Vitals:  /53 (BP Location: Right arm, Patient Position: Chair, Cuff Size: Adult Regular)  Pulse 114  Temp 99.7  F (37.6  C) (Tympanic)  Resp 20  Wt 77.6 kg (171 lb)  SpO2 97%  BMI 31.28 kg/m2 Estimated body mass index is 31.28 kg/(m^2) as calculated from the following:    Height as of 2/7/17: 1.575 m (5' 2\").    Weight as of this encounter: 77.6 kg (171 lb).. Body surface area is 1.84 meters squared. BP completed using cuff size: regular  Extreme Pain (9) No LMP recorded. Patient has had a hysterectomy. Allergies and medications reviewed.     Medications: Medication refills not needed today.  Pharmacy name entered into IGG:    [facility] - [place], [place] - [address] [facility] [place], [place] - [address] [place] [address] [phone]  [facility] [id number] [place] [address] - [phone] [facility] [address]    Comments:     6 minutes for nursing intake (face to face time)   Cyn Calvillo CMA        "

## 2017-02-15 NOTE — PROGRESS NOTES
Gynecologic Oncology Follow-Up Note  RE: Etelvina Jacobs  MRN: [id number]  : 1950  Date of Visit: 02/15/2017    CC: Etelvina Jacobs is a 66 year old year old female with recurrent metastatic ovarian cancer and a history of VAIN III who presents today for follow up regarding disease management.    HPI: Etelvina comes to the clinic accompanied by her  Luc. She feels she has been tolerating chemotherapy fairly well but notes that her breathing is more difficult at times. She has been seeing palliative care for this and takes Oxycontin BID, Dilaudid PRN, and inhalers twice daily. She started dexamethasone roughly one week ago for her breathing. She notes a cough at times, no hemoptysis. She has been using home oxygen. Her nosebleeds have improved with the use of saline nasal spray. She did slip on the ice yesterday and twisted her right ankle but did not fall or hit her head. Her right big toe has been hurting for the past 24h and is her biggest source of discomfort today- she states she has had gout in the past and that this is consistent with prior episodes. She has not taken anything for gout before. She states her heartburn is improving, takes Tums as needed. Her appetite has decreased but she does not want to start an appetite stimulant at this time. She continues to have anxiety attacks, feels increased sertraline dose has been helpful and that she takes lorazepam roughly twice daily for anxiety with adequate control.  She does have neuropathy at baseline and occasional issues with walking and dizziness for which she uses a cane. She denies worsening of her neuropathy. She has a history of brain mets treated with gamma knife and states she has occasional confusion with making decisions and poor short term memory but that she never forgets where she is, who she is, or feels unsafe. She adamantly states that she would like to continue with chemotherapy today.    Oncology History:  2009 The patient  presented to urgent care at the [facility] 12/2009 where she was admitted for workup of abdominal distention and a mass. Her creatinine upon presentation was 4.7. [facility]. As part of her work up prior to surgery she had a colonoscopy and Mammogram performed both negative.    12/17/09 she underwent exploratory laparotomy, total abdominal hysterectomy bilateral salpingo-oophorectomy, bilateral pelvic lymph node dissection, bilateral periaortic lymph node biopsy, total omentectomy, peritoneal washings submitted for cytology, diaphragmatic scraping, staging biopsies of the peritoneum and bilateral uterolysis with G[facility] Onc at [facility]. Her post operative course was complicated by ileus. She was discharged and the [facility] after [facility]. Final pathology revealed stage IC grade 3 ovarian cancer. Initial  was 287.    2/11/11  -11  2/25/11:She is s/p 6 cycles of Carbo and Taxol, completed 5/14/10. CA-125 normalized at 9 on 6/11/2010. Also complains of anxiety which has increased with cancer diagnosis, she is seeing Psych regarding this and is not taking medications.    Vaginal dysplasia, she has a history of HSIL paps- last 6/11/2010. A subsequent colpo was performed revealing VAIN III at 2 oclock on the vaginal cuff. She then underwent EUA, vaginal bx and CO2 laser on 10/14/10 which the pathology returned as VAIN II-III. A repeat pap smear in January 2011 showed HSIL.    She underwent a follow up colposcopy with two upper vaginal biopsies. She presents to discuss treatment plans. Pathology from the vaginal biopsy shows VAIN III. She was schedule for EUA and vaginectomy:  3/24/11 Vaginectomy for VAIN III    Pathology showed VAIN III, no evidence of malignancy    9/21/2011 evaluated in the emergency room due to shortness of breath, Chest x-ray showed a left lung lesion. Patient is a former smoker.    11/7/11 Pap done: (HSIL)    11/10/11  "CT chest/abd/pelvis demonstrated: Increased size of bilateral pulmonary nodules and new pleural based nodule on the left as detailed above. There is also a new prominent left hilar lymph node. These findings are concerning for worsening metastatic disease. No evidence for metastatic disease in the abdomen or pelvis. Slight decrease in prominence of the left vaginal cuff. Resolution of the previously identified fluid collection in the right pelvis. Stable appearance of a small hypodense nodule in the left lobe of the thyroid.    11/16/11 Left Thoracoscopic Lower Lobe Wedge Resection with Pleura. Pathology showed Immunostains show that the tumor is strongly positive for CA-125, and also positive for WT-1, consistent with a high grade serous carcinoma. The tumor is negative for the pulmonary adenocarcinoma marker TTF-1, and also negative for the mesothelial marker calretinin. The results further confirm the diagnosis of metastatic ovarian carcinoma.    12/5/12: Disease: Per Dr Sexton: discussed chemotherapy plan of carbo/taxol with patient and  who are agreeable to this treatment. Discussed having chest port placed for chemotherapy as prior chemotherapy regimen of carbo/taxol was given peripherally and patient had some difficulty with this delivery option. Vaginal intraepithelial neoplasia- Will continue to monitor and plan to repeat pap once chemotherapy (6 cycles) have finished.    12/13/11:  - 41. Cycle 1 Carbo/Taxol    1/4/12:  - 18. Cycle 2. Left wrist cellulitis after chemo treated locally with antibiotics x 7 days. Port placement planned. Some bone pain after chemo. Occasional use of vicodin controls pain. Nausea treated x 14 days. No vomiting. Hair loss. Anxiety worse. Pt bought a \"therapy\" puppy. Has had counseling in past. Some vaginal discomfort which is unchanged. Not sexually active.  1/25/12:  - 13. Cycle 3. Mild nausea well controlled with Ativan. Port placed without problems. Pain " controlled. Creatinine low but stable.    2/15/12:  - 12. Cycle 4. Bone pain x one week after chemo. Nausea x one week. Using meds with reasonable management of symptoms. Percocet and ativan refills requested. Port without complications.    3/7/12:  - 30. Cycle 5 Anxiety/depressed mood - using ativan rarely. Emotional liability, anxiety and depressed mood worsening since diagnosis. Start zoloft 50 mg qd - counseling. Increase dose if indicated.    HTN - labile - continue to monitor    3/28/12 Ca 125 17.Cycle 6 of chemotherapy  4/20/2012 CT scan: Decreased pulmonary lesions and no other new lesions noted  Significant decrease in size of a 5 mm right lower lobe nodule (series 3 image 42) previously 9-10 mm. Near-complete resolution of a rounded nodule previously seen along the medial aspect of the left major fissure (series 3 image 30).    7/20/12: CT Scan abdomen/pelvis: discussed with radiology - bilateral lung nodules continue to decrease in size with overall improving metastatic disease, no recurrence or new metastatic disease seen.    7/30/12: HSIL by pap    9/21/12: CA-125: 19.    10/19/12: Vaginal biopsy showed VAIN III. Recommendation for treatment with upper vaginal excision and CO2 laser to the vagina.  CA-125: 25  1/31/13: Colposcopy, excision of the left aspect of the upper vagina with vaginal biopsies, and CO2 laser of the upper vagina    Surgical pathology report:  A. Vagina, upper, biopsy  - High-grade squamous intraepithelial lesion / squamous cell carcinoma in-situ (VaIN 3)  - No definitive evidence of invasion  B. Vagina, #2, biopsy  - High-grade squamous intraepithelial lesion / squamous cell carcinoma in-situ (VaIN 3)  - No definitive evidence of invasion  2/13/13 10 day course topical 5-FU    04/23/2013:  - 96. Pap LSIL.  4/30/13 CT C/A/P IMPRESSION:  1. New subcarinal and posterior mediastinal lymphadenopathy since 7/20/2012 concerning for new metastatic disease.  2. Stable  bilateral lung nodules measuring up to 5 mm in size since 10/19/2012. No new pulmonary nodules.  3. No evidence of metastatic disease in the abdomen.  4. Moderate hiatal hernia, unchanged.  Decision made to start Doxil.  5/31/13-8/23/13: Cycle #1-4 Doxil.  87, 103, 92, 121.  9/16/13 CT C/A/P Impression:  1. Progressive enlargement of right paratracheal lymph nodes and stable size of subcarinal and posterior mediastinal lymphadenopathy. This remains concerning for metastases.  2. Stable bilateral lung nodules, measuring up to 5 mm.  3. No evidence of metastatic disease in the abdomen and pelvis.  4. Moderate hiatal hernia  9/18/13: Cycle 1 Avastin/cytoxan  10/9/13: Cycle 2 Avastin/cytoxan.  - 46. RF cytoxan and ativan. Mildly increased nausea which ativan relieves. More heartburn but using prilosec daily in am. Some vulvar symptoms.   10/30/13: Cycle 3 Avastin/cytoxan.  - 36.    11/18/13 CT C/A/P Impression:   1. Stable adenopathy within the mediastinum since 9/16/2013.  2. Pulmonary nodules measuring up to 5 mm in dimension, unchanged since 11/10/2011.  3. No evidence of metastatic disease within the abdomen and pelvis.  4. Moderate hiatal hernia.  5. Diverticulosis without CT evidence of diverticulitis.  11/20/13-1/2/14: Cycle #4-6 Avastin/Cytoxan.  36, 45, 46..  1/20/14 PET/CT IMPRESSION:   1. Several hypermetabolic and enlarged mediastinal lymph nodes concerning for metastatic disease from either the patient's ovarian or vaginal neoplasm. These lymph nodes have shown slowly progressive increase in size.  2. Minimal metabolic uptake with associated soft tissue thickening of the left vaginal cuff representing treated residual tumor. Decision to switch to Carboplatin/Gemzar.  1/22/14: Cycle #1 Carboplatin/Gemzar; CA-125 33 - missed day #8 Gemzar due to thrombocytopenia. C/O nausea, vomiting, nose bleeds.  Pap: Epithelial Cell Abnormality: Squamous Cell: High-grade squamous intraepithelial  lesion (HSIL) encompassing: moderate and severe dysplasia, carcinoma In Situ/ CIN2 and NEFTALY 3.  2/12/14: Cycle #2 Carbo/Gemzar (dose reduced). reaction to her chemotherapy on day one of cycle 2 of carboplatin therapy requiring ED visit. Had choking and shortness of breath.  - 38. Colpo: carcinoma in situ.    2/19/14: 5-FU cream prescribed for VAIN III. H. Pylori breath test ordered. Will plan to continue carboplatin/gemzar therapy with next dose inpatient so that duration of infusion can be increased.    3/6/14: Gemzar/carbo (inpt) cycle #3.  - 38.    3/26/14: Cycle #4 Gemzar/Carboplatin under desensitization; CA-125 25  4/14/14: PET/CT: IMPRESSION:    1. Positive response to therapy of metastatic ovarian cancer with decreased size and hypermetabolism of mediastinal lymphadenopathy.    2. No new recurrent or metastatic disease.    3. Red marrow activation in the spine and proximal long bones related to G-CSF injection  4/21/14-5/12/14: Cycle #5-6 Gemzar/Carboplatin under desensitization;   9, 21.  6/17/14 PET/CT Impression: In this patient with history of metastatic ovarian cancer there is evidence of worsening metastatic disease in the chest as several of the previously identified mediastinal nodes and a left hilar node have increased in metabolic activity and several have increased in size from comparison.  Etelvina Jacobs is a 63 year old woman with a diagnosis of recurrent, metastatic, stage IC grade 3 ovarian cancer and history of VAIN III s/p laser x 2 with history of recurrent high grade vaginal dysplasia and s/p left lower lobe resection for pulmonary metastasis. She is here today for follow up after completion of six cycles Carbo/Gemzar. Recent PET/CT shows evidence of worsening metastatic disease in the chest as several of the previously identified mediastinal nodes and a left hilar node have increased in metabolic activity and several have increased in size from comparison.    Patient has  had continuous chemotherapy since 2013 and discussion was had with patient that it is important to consider quality of life which is best achieved with some time off chemotherapy and then proceed with chemo in two months.    Reviewed various chemotherapy agents including consideration of clinical trial. In conclusion patient has not received topotecan chemotherapy and this is a reasonable chemotehrapy agent ( as either weekly or 5 x dily regiment). Additional counseling provided by the chemotherapy coordinator and discussion of treatment schedule.  8/25/14: Had good chemo break. No concerns other than anxiety about chemo and potential reactions. She has had counseling regarding chemo side effects with Topotecan. Ativan refill requested. Anxiety remains chronic with prn use of medication only as desired per pt. Not using effudex for VAIN - no bothersome symptoms. She wishes to continue to chemo therapy. No change in medications or history since last seen. Ca-125: 32  8/25-8/29/14: Cycle 1 Topotecan,  - 19  3 days of diarrhea after Neulasta shot-possible gastroenteritis.  9/15/14: Cycle 2 of topotecan.  10/6/14: Cycle 3 of 3. Topotecan.  - 22. Feels well. Some bone pain after neulasta. Uses few medications and wishes to avoid if possible for symptom managment. No diarrhea this cycle. Fatigue first week post chemo then improves. Continues to desire chemo treatments. Mood stable. No new concerns.    10/29/14: CT C/A/P: Mediastinal lymphadenopathy including a 1.4x1.7 cm right paratracheal lymph node, previously measured at 0.8x1.5 cm. IMPRESSION: Slight worsening in mediastinal lymphadenopathy, with no evidence of distant metastasis in abdomen or pelvis, in this patient with history of metastatic ovarian cancer.    Etelvina Jacobs is a 63 year old woman with a diagnosis of recurrent, metastatic, stage IC grade 3 ovarian cancer and history of VAIN III s/p laser x 2 with history of recurrent high grade vaginal  dysplasia and s/p left lower lobe resection for pulmonary metastasis. She is here today for CT results after chemo treatment. Discussed results with slightly mediastinal lymphadenopathy.  Continue 3 more cycles of topotecan. Then repeat imaging.    11/3/14: Pap: HSIL; Ca 125 25  11/17/14: Topetecan #4  12/8/14: Topetecan #5  - 19.    12/11/14: Colposcopy after 11/3 pap was HSIL. Her mood is poor and she is not currently on medication for depression. She has used Zoloft in the past, which has worked well for her. Started 25 mg dose; declines counseling.    Vagina, 8:00 and 12:00, colposcopic biopsy:  -Vaginal intraepithelial neoplasia 3/high grade squamous intraepithelial lesion  -No invasive carcinoma identified (see comment)  12/29/14: Topetecan #6 Topetecan.  -17. Pt on Zoloft 25 mg. Anxiety improved with some initial sleepiness which has now resolved. Had been on 50 mg in the past and she wishes to increase dose. Vaginal biopsy VAIN III and surgical consult pending with Dr Markham. Pt using neulasta. No new concerns. Weight gain pt attributes to eating more.    1/13/15: CT C/A/P  IMPRESSION:    1. Stable disease with stable mediastinal lymphadenopathy, presumed to be metastatic ovarian cancer. No significant change in small right hilar lymph nodes.  2. No evidence of metastatic disease in the abdomen and pelvis on this noncontrast evaluation.  =30  4/20/15: CT C/A/P IMPRESSION:   1. Overall worsening metastatic ovarian cancer. Specifically, there is increasing size and number of multiple pulmonary nodules, increasing size of mediastinal lymph nodes, and increasing size of soft tissue nodule in the right mesorectum since 1/13/2015.  2. Large hiatal hernia again seen.     4/28/15-7/23/15: Cycle #1-3 weekly Taxol  58, 31, 26.  9/10/15: admitted for bilateral PE; started on Lovenox BID. DC 9/11/15.  9/15/15: Heparin 10A 2.66 (peripheral). Instructed to hold evening dose of 9/16 and am dose of  9/17.  9/17/15: Cycle #4 weekly Taxol.  32.    11/2/15: CT c/a/p  IMPRESSION:  1. Increase in size of pulmonary nodules and mediastinal/hilar  lymphadenopathy since 4/20/2015, without significant change from the  chest CT of 9/10/2015 as above. Previously seen bilateral pulmonary  emboli are not appreciated on the current exam.    2. No evidence of disease progression in the abdomen/pelvis in this  patient with a history of metastatic ovarian cancer.    11/12/15: C5 D1 Taxol/Avastin.  32  11/19/15: C5D8 Taxol.  11/27/15: C5D15 Taxol.  12/3/15: C5D22 Taxol/Avastin.  12/10/15: C5D29 Taxol.  12/17/15: C5D36 Taxol.  12/21/15: 1L NS IV.    1/8/16: CT c/a/p  Impression:    In this patient with a history of ovarian cancer previously metastatic  to the chest:  1. Since 11/2/2015, some mediastinal lymph nodes are slightly  decreased in size and some are unchanged. Nodules/nodes on the left  are decreased as described above. Additional indeterminate sub-4 mm  pulmonary nodules are stable. Attention on followup imaging is  recommended.  2. No evidence for disease progression in the abdomen or pelvis.    12/21/15: admitted for L great toe pain, possible brain mets with hemmorrhage.  24 hour events:    - IVF resuscitation  - MRI showing possible hemorrhagic metastases vs. Vascular incidents      1/25/16:Neurosurgery consult  continue observation    3/15/2016 - Right frontal lesion, she underwent Gamma Knife Radiosurgery in March 15, 2016.  At the time of GKR, the thin cut MRI demonstrated a smaller left frontal lesion as well, which was not seen in the previous MRI, likely due to the thickness of the image slices.  Therefore, she underwent treatment of the two lesions, right frontal and left frontal lesions.     4/11/2016: CT scan  . There is new ill-defined hazy fat stranding in the central pelvis  without well-defined nodularity on this exam. Findings are  nonspecific, but may be represent developing metastatic  deposits.  Attention on followup imaging is recommended.  2. Minimal increase in a subcentimeter left lower lobe pleural-based  metastatic deposit, otherwise no significant disease progression in  the chest.    Patient Name: FRANCISCA GRACE   MR#: [id number]   Specimen #: [id number]   Collected: 4/22/2016   Received: 4/22/2016   Reported: 4/25/2016 12:15   Ordering Phy(s): ALEXEI GARCES     SPECIMEN(S):   Vaginal biopsy     FINAL DIAGNOSIS:   Vagina, biopsy:   -High grade squamous intraepithelial lesion (vaginal intraepithelial   neoplasia 3, VaIN 3)   -Fragmented specimen   -Most tissue fragments consist of epithelium only (cannot assess for the   presence or absence of invasive carcinoma)   -See comment     10/27/16  Complains of SOB, cough, low-grade fever for the past two months. Has worsened over the last three weeks and was diagnosed with bronchitis at [facility]. Was provided with an inhaler; no antibiotics. Additionally has had a 10 pound weight loss over the last month. Currently on warfarin for history of PE. Denies chest pain, abdominal pain, nausea, constipation, diarrhea, problems with urination, changes in vision, speech, hearing.     Rad-onc visit in September 2016 showed continued treatment effect for brain mets with no obvious new metastatic lesions.      11/3/2016  Hospitalized on 10/27/16 for acute SOB. Discharge on 3L of oxygen  CT on admission:  1. Negative for pulmonary embolus.  2. In this patient with history of ovarian cancer and previous  metastatic disease to the chest, there is evidence for significantly  advanced metastatic disease within the chest when compared with exam  dated 4/11/2016. 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 Superimposed infection could be  a consideration in the appropriate clinical  "context.  3. Worsening perirectal adenopathy and increased size of upper  abdominal lymph nodes consistent with metastatic progression.  4. Moderate hiatal hernia is stable.    Patient has continued needing 3L of oxygen at home. Has not been able to do much due to shortness of breath and fatigue. Notes that she frequently \"panics\" which makes her breathing worse. Mentions that she does not wish resuscitation or intubation.    11/9/16: C1D1 weekly Taxol with biweekly Avastin.  143.  12/28/16: C2D1 weekly Taxol with biweekly Avastin.  44.  2/15/17: C3D1 weekly Taxol with biweekly Avastin.  pending.      Past Medical History   Diagnosis Date     Anxiety      Depression      Gastro-oesophageal reflux disease      Hyperlipidemia      Hypertension      Ovarian cancer (H) 12/09     Stage IC grade 3 ovarian cancer     Pulmonary nodules      Renal disease      insuffiency     Shortness of breath      VAIN III (vaginal intraepithelial neoplasia grade III) 3/24/11       Past Surgical History   Procedure Laterality Date     Vulva surgery  3/24/11     VAIN III     Hysterectomy radical  12/09     Stage IC grade 3 ovarian cancer,     Thoracoscopic wedge resection lung  11/16/2011     Procedure:THORACOSCOPIC WEDGE RESECTION LUNG; Left Thoracoscopic  Lower Lobe Wedge Resection with Pleura; Surgeon:TAMARA SERVIN; Location:[facility] OR     Biopsy vaginal  1/31/2013     Procedure: BIOPSY VAGINAL;  Colposcopy, CO2 Laser to Vagina, Upper Vaginal biopsies;  Surgeon: Marietta Sexton MD;  Location: [facility] OR     Laser co2 vagina  1/31/2013     Procedure: LASER CO2 VAGINA;;  Surgeon: Marietta Sexton MD;  Location: [facility] OR       Current Outpatient Prescriptions   Medication     oxyCODONE (OXYCONTIN) 10 MG 12 hr tablet     dexamethasone (DECADRON) 2 MG tablet     albuterol (PROAIR HFA/PROVENTIL HFA/VENTOLIN HFA) 108 (90 BASE) MCG/ACT Inhaler     warfarin (COUMADIN) 2.5 MG tablet     LORazepam (ATIVAN) 1 MG tablet     HYDROmorphone " (DILAUDID) 2 MG tablet     sertraline (ZOLOFT) 50 MG tablet     sodium chloride (OCEAN) 0.65 % nasal spray     umeclidinium-vilanterol (ANORO ELLIPTA) 62.5-25 MCG/INH oral inhaler     albuterol (ALBUTEROL) 108 (90 BASE) MCG/ACT Inhaler     LORazepam (ATIVAN) 0.5 MG tablet     prochlorperazine (COMPAZINE) 5 MG tablet     senna (SENOKOT) 8.6 MG tablet     order for DME     warfarin (COUMADIN) 5 MG tablet     ipratropium - albuterol 0.5 mg/2.5 mg/3 mL (DUONEB) 0.5-2.5 (3) MG/3ML nebulization     DiphenhydrAMINE HCl (BENADRYL PO)     ondansetron (ZOFRAN) 8 MG tablet     omeprazole (PRILOSEC) 20 MG capsule     order for DME     prochlorperazine (COMPAZINE) 10 MG tablet     No current facility-administered medications for this visit.           Allergies   Allergen Reactions     Carboplatin Difficulty breathing     Reaction to Carboplatin on 8th dose.        Family History   Problem Relation Age of Onset     Cancer - colorectal Paternal Grandmother      CANCER Paternal Aunt      stomach     C.A.D. Father      Hypertension Mother      DIABETES Mother        Social History     Social History     Marital status:      Spouse name: N/A     Number of children: N/A     Years of education: N/A     Occupational History     Not on file.     Social History Main Topics     Smoking status: Former Smoker     Smokeless tobacco: Never Used      Comment: Patient hasn't smoked for over 5 years 04/23/15     Alcohol use No      Comment: no alcohol use     Drug use: No     Sexual activity: No     Other Topics Concern     Not on file     Social History Narrative       ROS  General: + weakness and weight loss, decrease in appetite. Denies fatigue, night sweats, hot flashes, fever, chills, or difficulty sleeping  HEENT: + spots/floaters and epistaxis. Denies headaches, hair loss, visual difficulty or disturbances, diplopia, masses, head injury, tinnitus, hearing loss, epistaxis, congestion, problems with teeth or gums, dysphonia, or  dysphagia  Pulmonary: + cough, shortness of breath, dyspnea on exertion. Denies  hemoptysis, wheezing, or allergies  Cardiovascular: Denies chest pain, fainting, palpitations, murmurs, activity intolerance, swelling in legs, or high blood pressure  Gastrointestinal: + heartburn. Denies nausea, vomiting, constipation, diarrhea, abdominal pain, bloating, melena, hematochezia, or jaundice  Genitourinary: Denies dysuria, urinary urgency or frequency, hematuria, cloudy or malodorous urine, incontinence, repeat urinary tract infections, flank pain, pelvic pain, vaginal bleeding, vaginal discharge, or vaginal dryness  Sexual Function: Denies pain with intercourse, changes in libido, arousal difficulty, or changes in orgasm  Integumentary: Denies rashes, sores, changing moles, or scarring  Hematologic: Denies swollen lymph nodes, masses, easy bruising, or easy bleeding  Musculoskeletal: Denies falls, back pain, myalgias, arthralgias, stiffness, muscle weakness or muscle cramps  Neurologic: + trouble walking, numbness/tingling. Denies changes in memory, dizziness, seizures, or tremors  Psychiatric: + anxiety and nervousness. Denies depression, mood changes, suicidal thoughts, or difficulty concentrating  Endocrine: Denies polydipsia, polyuria, temperature intolerance, or history of thyroid disease      Physical Exam:    /53 (BP Location: Right arm, Patient Position: Chair, Cuff Size: Adult Regular)  Pulse 114  Temp 99.7  F (37.6  C) (Tympanic)  Resp 20  Wt 77.6 kg (171 lb)  SpO2 97%  BMI 31.28 kg/m2    CONSTITUTIONAL: Alert non-toxic appearing female in no acute distress  HEAD: Normocephalic, atraumatic  EYES: PERRLA; no scleral icterus  ENT: Oropharynx pink without lesions  NECK: Neck supple without lymphadenopathy  RESPIRATORY: Lungs clear to auscultation, no increased work of breathing noted  CV: Tachycardic with regular rhythm, S1S2, no clicks, murmurs, rubs, or gallops; bilateral lower extremities with trace  edema, dorsalis pedis pulses 2+ bilaterally  GASTROINTESTINAL: Normoactive bowel sounds x4 quadrants, abdomen soft, non-distended, and non-tender to palpation without masses or organomegaly  GENITOURINARY: Not indicated  LYMPHATIC: Cervical, supraclavicular, and inguinal nodes without lymphadenopathy  MUSCULOSKELETAL: Moves all extremities, no obvious muscle wasting; L ankle with full ROM, no ecchymosis, no tenderness to palpation; R ankle with full ROM, no ecchymosis, no tenderness to palpation; R podagra with warmth, erythema, and tenderness to palpation at MTP- remaining toes without warmth, erythema, or tenderness  NEUROLOGIC: No gross deficits, sitting in wheelchair  SKIN: Appropriate color for race, warm and dry, no rashes or lesions to unclothed skin  PSYCHIATRIC: Pleasant and interactive, affect bright, makes appropriate eye contact, thought process linear    Labs:      2/15/2017  Day 1   Hemoglobin 11.7 - 15.7 g/dL 8.5 (A)   Hematocrit 35.0 - 47.0 % 28.4 (A)   Platelet Count 150 - 450 10e9/L 297   Absolute Neutrophil 1.6 - 8.3 10e9/L 6.1   Sodium 133 - 144 mmol/L 140   Potassium 3.4 - 5.3 mmol/L 3.7   Chloride 94 - 109 mmol/L 105   Carbon Dioxide 20 - 32 mmol/L 25   Urea Nitrogen 7 - 30 mg/dL 10   Creatinine 0.52 - 1.04 mg/dL 1.34 (A)   Calcium 8.5 - 10.1 mg/dL 8.8   Magnesium 1.6 - 2.3 mg/dL 1.7   Bilirubin Total 0.2 - 1.3 mg/dL 0.4   ALT 0 - 50 U/L 12   AST 0 - 45 U/L 18   Alkaline Phosphatase 40 - 150 U/L 75   Albumin 3.4 - 5.0 g/dL 2.9 (A)   Protein Total 6.8 - 8.8 g/dL 6.4 (A)   Uric Acid 2.6 - 6.0 mg/dL 7.2 (A)   WBC 4.0 - 11.0 10e9/L 8.2      pending    Assessment/Plan:  1) Recurrent metastatic ovarian cancer: Proceed with cycle three of weekly Taxol with every 2 weeks Avastin. To receive total of 3 cycles followed by imaging and treatment planning with Dr. Shahrzad. Creatinine elevated, however, this appears to be per her recent baseline. Discussed her oral intake- declines an appetite stimulant  at this time. Encouraged small frequent meals that are calorically dense. To continue to see palliative care. Reviewed signs and symptoms for when she should contact the clinic or seek additional care, including but not limited to fever, chills, inability to keep down food or fluids, nausea and vomiting not controlled with antiemetics, and diarrhea leading to dehydration. Patient to contact the clinic with any questions or concerns in the interim. Reinforced with patient that she never needs to proceed with chemotherapy if she does not want to and if she ever wants to switch to hospice she may.  2) Anxiety: Reports mild to moderate improvement in anxiety. Continue with Zoloft 50mg PO daily and Ativan 1mg q6h PRN for anxiety/nausea/sleep. Declines counseling at this time.  3) Difficulty breathing: Most likely due to lung metastases- I do not suspect pneumonia at this time as her lungs are clear, her breathing appears non-labored, and her labs are largely normal per her baseline. To continue with management per palliative care.  4) Right toe pain: Suspicious for gout, uric acid elevated- I do not suspect septic arthritis at this time given her normal WBC count and the fact that it is limited to her R podagra. After discussion with pharmacy, will have her start prednisone 30mg PO x5 days. To continue taking dexamethasone daily for her breathing- this was reviewed with pharmacy.  5) Health maintenance issues discussed include to follow up with PCP for non-gynecologic concerns and co-morbid conditions. To continue following up with palliative care.  6) Patient verbalized understanding of and agreement with plan    A total of 35 minutes of face to face time were spent with the patient with over 50% of that time spent in counseling, coordination of care, education, and symptom management.    LEANDRA Billy, FNP-C  Division of Gynecologic Oncology  [facility]  Pager: [phone]

## 2017-02-15 NOTE — PATIENT INSTRUCTIONS
Contact Numbers  [facility] Nurse Triage: [phone]  After Hours Nurse Line:  [phone]  Hospital Main Line:  [phone]    Please call the [facility] Triage line if you experience a temperature greater than or equal to 100.5, shaking chills, have uncontrolled nausea, vomiting and/or diarrhea, dizziness, shortness of breath, chest pain, bleeding, unexplained bruising, or if you have any other new/concerning symptoms, questions or concerns.     If it is after hours, weekends, or holidays, please call either the after hours nurse line listed above or the main hospital  at  [phone] and ask to speak to the Oncology doctor on call.     If you are having any concerning symptoms or wish to speak to a provider before your next infusion visit, please call your care coordinator or triage to notify them so we can adequately serve you.     If you need a refill on a narcotic prescription or other medication, please call triage before your infusion appointment.         February 2017 Sunday Monday Tuesday Wednesday Thursday Friday Saturday 1     [facility]   10:00 AM   (15 min.)    [facility]   [facility] Lab Draw     P ONC INFUSION 120   10:30 AM   (120 min.)    ONCOLOGY INFUSION   [facility] 2     3     4       5     6     7     [facility] RETURN   12:45 PM   (30 min.)   Darlene Peck MD   [facility] 8     9     10     11       12     13     14     15     [facility]    7:30 AM   (15 min.)    [facility] DRAW   [facility] Lab Draw     [facility] RETURN ACTIVE TREATMENT    7:45 AM   (40 min.)   Valentina Haney APRN CNP   [facility]     [facility] INFUSION 180    9:00 AM   (180 min.)   [facility]COL[facility] INFUSION   [facility] 16     17     18       19     20     21     22     [facility]RAW    9:30 AM   (15 min.)   [facility]   [facility] Draw      [facility] INFUSION 120   10:00 AM   (120 min.)   [facility] ONCOLOG[facility] INFUSION   [facility] 23     24     25       26     27     28 March 2017 Sunday Monday Tuesday Wednesday Thursday Friday Saturday                  1     [facility] D[facility]   10:00 AM   (15 min.)    [facility] DRAW   [facility] Draw     [facility] INFUSION 180   10:30 AM   (180 min.)    ONCOLOGY INFUSION   [facility] 2     3     4       5     6     7     [facility] RETURN    1:15 PM   (30 min.)   Darlene Peck MD   [facility] 8     [facility] DRAW    9:30 AM   (15 min.)    [facility] DRAW   [facility] Draw     [facility] INFUSION 120   10:00 AM   (120 min.)    ONCOLOGY INFUSION   [facility] 9     10     11       12     13     14     15     [facility] DRAW   10:00 AM   (15 min.)    [facility]     [facility] INFUSION 180   10:30 AM   (180 min.)    ONCOLOGY INFUSION   [facility] 16     17     18       19     20     21     22     [facility] D[facility]   10:00 AM   (15 min.)    [facility] DRAW   [facility] Draw     [facility] INFUSION 120   10:30 AM   (120 min.)    ONCOLOGY INFUSION   [facility] 23     24     25       26     27     28     29     [facility] DRAW    7:30 AM   (15 min.)    [facility] DRAW   [facility] Draw     [facility] RETURN ACTIVE TREATMENT    8:05 AM   (40 min.)   Valentina Haney APRN CNP   [facility]     [facility] INFUSION 180    9:00 AM   (180 min.)    ONCOLOGY INFUSION   [facility] 30     31                       Lab Results:  Recent Results (from the past 12 hour(s))   CBC with platelets differential    Collection Time: 02/15/17  7:37 AM   Result Value Ref Range    WBC 8.2 4.0 - 11.0 10e9/L    RBC Count 3.37 (L) 3.8 - 5.2 10e12/L    Hemoglobin 8.5 (L)  11.7 - 15.7 g/dL    Hematocrit 28.4 (L) 35.0 - 47.0 %    MCV 84 78 - 100 fl    MCH 25.2 (L) 26.5 - 33.0 pg    MCHC 29.9 (L) 31.5 - 36.5 g/dL    RDW 24.1 (H) 10.0 - 15.0 %    Platelet Count 297 150 - 450 10e9/L    Diff Method Automated Method     % Neutrophils 74.6 %    % Lymphocytes 9.5 %    % Monocytes 12.4 %    % Eosinophils 2.4 %    % Basophils 0.5 %    % Immature Granulocytes 0.6 %    Nucleated RBCs 0 0 /100    Absolute Neutrophil 6.1 1.6 - 8.3 10e9/L    Absolute Lymphocytes 0.8 0.8 - 5.3 10e9/L    Absolute Monocytes 1.0 0.0 - 1.3 10e9/L    Absolute Eosinophils 0.2 0.0 - 0.7 10e9/L    Absolute Basophils 0.0 0.0 - 0.2 10e9/L    Abs Immature Granulocytes 0.1 0 - 0.4 10e9/L    Absolute Nucleated RBC 0.0    Comprehensive metabolic panel    Collection Time: 02/15/17  7:37 AM   Result Value Ref Range    Sodium 140 133 - 144 mmol/L    Potassium 3.7 3.4 - 5.3 mmol/L    Chloride 105 94 - 109 mmol/L    Carbon Dioxide 25 20 - 32 mmol/L    Anion Gap 9 3 - 14 mmol/L    Glucose 135 (H) 70 - 99 mg/dL    Urea Nitrogen 10 7 - 30 mg/dL    Creatinine 1.34 (H) 0.52 - 1.04 mg/dL    GFR Estimate 40 (L) >60 mL/min/1.7m2    GFR Estimate If Black 48 (L) >60 mL/min/1.7m2    Calcium 8.8 8.5 - 10.1 mg/dL    Bilirubin Total 0.4 0.2 - 1.3 mg/dL    Albumin 2.9 (L) 3.4 - 5.0 g/dL    Protein Total 6.4 (L) 6.8 - 8.8 g/dL    Alkaline Phosphatase 75 40 - 150 U/L    ALT 12 0 - 50 U/L    AST 18 0 - 45 U/L   Magnesium    Collection Time: 02/15/17  7:37 AM   Result Value Ref Range    Magnesium 1.7 1.6 - 2.3 mg/dL   Uric acid    Collection Time: 02/15/17  7:37 AM   Result Value Ref Range    Uric Acid 7.2 (H) 2.6 - 6.0 mg/dL

## 2017-02-15 NOTE — TELEPHONE ENCOUNTER
Spoke with patient today in clinic after her visit with GYN ONC. She tells me that she was a bit confused about her medications yesterday and had a hard time explaining that to the nurse she spoke with. She tells me that she has been taking her Oxycontin Q8H and it has been helpful, however yesterday she accidentally forgot her mid day tab at home while she went out and had a coughing fit and had difficulty catching her breath. She notes they were able to get home quickly and she took her PRN hydromorphone along with the missed oxycontin dose. She asks if it would have been okay to take 3 tabs hydromorphone instead of 2 as it still took a bit to get her cough under control.     She also tells me that she has a hard time remembering to take her AM decadron dose all the time. Suggested that she try to have that bottle somewhere where she will see it right away in the morning and remember to take it -- like by her toothbrush. She verbalized agreement with this and states she will try this.     Advised I would get this update to Dr. Peck and get back to her if Dr. Peck is okay with 3 tabs hydromorphone if she needs it. Will check back in with patient next week as well. She is okay with this plan.

## 2017-02-15 NOTE — PROGRESS NOTES
Infusion Nursing Note:  Etelvina Jacobs presents today for D1 C3 Avastin/Taxol.    Patient seen by provider today: Yes: Valentina Haney NP    Intravenous Access:  Implanted Port.    Treatment Conditions:  Lab Results   Component Value Date    HGB 8.5 02/15/2017     Lab Results   Component Value Date    WBC 8.2 02/15/2017      Lab Results   Component Value Date    ANEU 6.1 02/15/2017     Lab Results   Component Value Date     02/15/2017      Lab Results   Component Value Date     02/15/2017                   Lab Results   Component Value Date    POTASSIUM 3.7 02/15/2017           Lab Results   Component Value Date    MAG 1.7 02/15/2017            Lab Results   Component Value Date    CR 1.34 02/15/2017                   Lab Results   Component Value Date    LUDWIG 8.8 02/15/2017                Lab Results   Component Value Date    BILITOTAL 0.4 02/15/2017           Lab Results   Component Value Date    ALBUMIN 2.9 02/15/2017                    Lab Results   Component Value Date    ALT 12 02/15/2017           Lab Results   Component Value Date    AST 18 02/15/2017     Results reviewed, labs MET treatment parameters, ok to proceed with treatment.    Note: Urine Protein Dipstick:  Trace  BP:  103/53    Post Infusion Assessment:  Patient tolerated infusion without incident.  Blood return noted pre and post infusion.  Site patent and intact, free from redness, edema or discomfort.  No evidence of extravasations.  Access discontinued per protocol.    Discharge Plan:   Prescription refills given for prednisone.  Discharge instructions reviewed with: Patient and Family.  Patient and/or family verbalized understanding of discharge instructions and all questions answered.  Copy of AVS reviewed with patient and/or family.  Patient will return 2/22/17 for next appointment.  Patient discharged in stable condition accompanied by: self and .  Departure Mode: Wheelchair.    Darlene Tello RN

## 2017-02-15 NOTE — MR AVS SNAPSHOT
After Visit Summary   2/15/2017    Etelvina Jacobs    MRN: [id number]           Patient Information     Date Of Birth          1950        Visit Information        Provider Department      2/15/2017 9:00 AM  24 ATC;  ONCOLOGY INFUSION [facility]        Today's Diagnoses     Ovarian cancer, left (H)    -  1    Encounter for long-term current use of medication          Care Instructions    Contact Numbers  [facility] Nurse Triage: [phone]  After Hours Nurse Line:  [phone]  Hospital Main Line:  [phone]    Please call the [facility] Triage line if you experience a temperature greater than or equal to 100.5, shaking chills, have uncontrolled nausea, vomiting and/or diarrhea, dizziness, shortness of breath, chest pain, bleeding, unexplained bruising, or if you have any other new/concerning symptoms, questions or concerns.     If it is after hours, weekends, or holidays, please call either the after hours nurse line listed above or the main hospital  at  [phone] and ask to speak to the Oncology doctor on call.     If you are having any concerning symptoms or wish to speak to a provider before your next infusion visit, please call your care coordinator or triage to notify them so we can adequately serve you.     If you need a refill on a narcotic prescription or other medication, please call triage before your infusion appointment.         February 2017 Sunday Monday Tuesday Wednesday Thursday Friday Saturday                  1     [facility] LAB DRAW   10:00 AM   (15 min.)    [facility] LAB DRAW   [facility] Lab Draw     [facility] ONC INFUSION 120   10:30 AM   (120 min.)    ONCOLOGY INFUSION   [facility] 2     3     4       5     6     7     [facility] RETURN   12:45 PM   (30 min.)   Darlene Peck MD   [facility] 8     9     10     11       12     13     14     15     [facility] DRAW    7:30 AM    (15 min.)    [facility] LAB DRAW   [facility] Lab Draw     [facility] RETURN ACTIVE TREATMENT    7:45 AM   (40 min.)   Valentina Haney APRN CNP   [facility]     [facility] ONC INFUSION 180    9:00 AM   (180 min.)    ONCOLOGY INFUSION   [facility] 16     17     18       19     20     21     22     [facility] DRAW    9:30 AM   (15 min.)    [facility] LAB DRAW   [facility] Draw     [facility] ONC INFUSION 120   10:00 AM   (120 min.)    ONCOLOGY INFUSION   [facility] 23     24     25       26     27     28 March 2017 Sunday Monday Tuesday Wednesday Thursday Friday Saturday                  1     [facility] DRAW   10:00 AM   (15 min.)    [facility] LAB DRAW   [facility] Draw     [facility] ONC INFUSION 180   10:30 AM   (180 min.)    ONCOLOGY INFUSION   [facility] 2     3     4       5     6     7     [facility] RETURN    1:15 PM   (30 min.)   Darlene Peck MD   [facility] 8     [facility] DRAW    9:30 AM   (15 min.)    [facility] LAB DRAW   [facility] Draw     [facility] ONC INFUSION 120   10:00 AM   (120 min.)    ONCOLOGY INFUSION   [facility] 9     10     11       12     13     14     15     [facility] LAB DRAW   10:00 AM   (15 min.)    [facility] LAB DRAW   [facility] Draw     [facility] ONC INFUSION 180   10:30 AM   (180 min.)    ONCOLOGY INFUSION   [facility] 16     17     18       19     20     21     22     [facility] DRAW   10:00 AM   (15 min.)    [facility] LAB DRAW   [facility] Draw     [facility] ONC INFUSION 120   10:30 AM   (120 min.)    ONCOLOGY INFUSION   [facility] 23     24     25       26     27     28     29     [facility] DRAW    7:30 AM   (15 min.)    [facility] LAB DRAW   [facility] Draw     [facility] RETURN ACTIVE TREATMENT    8:05 AM   (40 min.)   Lyndon  LEANDRA Montgomery CNP   [facility]     [facility] ONC INFUSION 180    9:00 AM   (180 min.)   [facility] ONCOLOGY INFUSION   [facility] 30     31                       Lab Results:  Recent Results (from the past 12 hour(s))   CBC with platelets differential    Collection Time: 02/15/17  7:37 AM   Result Value Ref Range    WBC 8.2 4.0 - 11.0 10e9/L    RBC Count 3.37 (L) 3.8 - 5.2 10e12/L    Hemoglobin 8.5 (L) 11.7 - 15.7 g/dL    Hematocrit 28.4 (L) 35.0 - 47.0 %    MCV 84 78 - 100 fl    MCH 25.2 (L) 26.5 - 33.0 pg    MCHC 29.9 (L) 31.5 - 36.5 g/dL    RDW 24.1 (H) 10.0 - 15.0 %    Platelet Count 297 150 - 450 10e9/L    Diff Method Automated Method     % Neutrophils 74.6 %    % Lymphocytes 9.5 %    % Monocytes 12.4 %    % Eosinophils 2.4 %    % Basophils 0.5 %    % Immature Granulocytes 0.6 %    Nucleated RBCs 0 0 /100    Absolute Neutrophil 6.1 1.6 - 8.3 10e9/L    Absolute Lymphocytes 0.8 0.8 - 5.3 10e9/L    Absolute Monocytes 1.0 0.0 - 1.3 10e9/L    Absolute Eosinophils 0.2 0.0 - 0.7 10e9/L    Absolute Basophils 0.0 0.0 - 0.2 10e9/L    Abs Immature Granulocytes 0.1 0 - 0.4 10e9/L    Absolute Nucleated RBC 0.0    Comprehensive metabolic panel    Collection Time: 02/15/17  7:37 AM   Result Value Ref Range    Sodium 140 133 - 144 mmol/L    Potassium 3.7 3.4 - 5.3 mmol/L    Chloride 105 94 - 109 mmol/L    Carbon Dioxide 25 20 - 32 mmol/L    Anion Gap 9 3 - 14 mmol/L    Glucose 135 (H) 70 - 99 mg/dL    Urea Nitrogen 10 7 - 30 mg/dL    Creatinine 1.34 (H) 0.52 - 1.04 mg/dL    GFR Estimate 40 (L) >60 mL/min/1.7m2    GFR Estimate If Black 48 (L) >60 mL/min/1.7m2    Calcium 8.8 8.5 - 10.1 mg/dL    Bilirubin Total 0.4 0.2 - 1.3 mg/dL    Albumin 2.9 (L) 3.4 - 5.0 g/dL    Protein Total 6.4 (L) 6.8 - 8.8 g/dL    Alkaline Phosphatase 75 40 - 150 U/L    ALT 12 0 - 50 U/L    AST 18 0 - 45 U/L   Magnesium    Collection Time: 02/15/17  7:37 AM   Result Value Ref Range    Magnesium 1.7 1.6 - 2.3 mg/dL   Uric acid     Collection Time: 02/15/17  7:37 AM   Result Value Ref Range    Uric Acid 7.2 (H) 2.6 - 6.0 mg/dL             Follow-ups after your visit        Your next 10 appointments already scheduled     Feb 22, 2017  9:30 AM CST   [facility] Lab Draw with UC [facility] LAB DRAW   [facility] Masonic Lab Draw ([facility])    [address] [address]   [phone]            Feb 22, 2017 10:00 AM CST   Infusion 120 with UC ONCOLOGY INFUSION, UC 28 ATC   [facility] ([facility])    [address] [place] [id number]   [phone]            Mar 01, 2017 10:00 AM CST   [facility] Lab Draw with UC [facility] LAB DRAW   [facility] Draw ([facility])    [address] [place] [address]   [phone]            Mar 01, 2017 10:30 AM CST   Infusion 180 with UC ONCOLOGY INFUSION, UC 22 ATC   [facility] ([facility])    [address] [place] [address]   [phone]            Mar 07, 2017  1:30 PM CST   (Arrive by 1:15 PM)   Return Visit with Darlene Peck MD   [facility] ([facility])    [address] [place] [id number]   [phone]            Mar 08, 2017  9:30 AM CST   [facility] Lab Draw with UC [facility] LAB DRAW   [facility] Lab Draw ([facility])    [address] [place] [address]   [phone]            Mar 08, 2017 10:00 AM CST   Infusion 120 with UC ONCOLOGY INFUSION, UC 28 ATC   [facility] ([facility])    [address] [place] [address]   [phone]              Future tests that were ordered for you today     Open Future Orders        Priority Expected Expires Ordered      Routine 4/1/2017 2/15/2018 2/15/2017    CT Chest/Abdomen/Pelvis w Contrast Routine 4/1/2017 2/15/2018 2/15/2017            Who to contact     If you have questions or need follow up information about today's clinic visit or your schedule please contact [facility] directly at [phone].  Normal or non-critical lab and imaging results will be communicated to you by MyChart, letter or phone within 4 business days after the clinic has received the results. If you do not hear from us within 7 days, please contact the clinic through [employer] or phone. If you have a critical or abnormal lab result, we will notify you by phone as soon as possible.  Submit refill requests through [employer] or call your pharmacy and they will forward the refill request to us. Please allow 3 business days for your refill to be completed.          Additional Information About Your Visit        [employer] Information     [employer] gives you secure access to your electronic health record. If you see a primary care provider, you can also send messages to your care team and make appointments. If you have questions, please call your primary care clinic.  If you do not have a primary care provider, please call [phone] and they will assist you.        Care EveryWhere ID     This is your Care EveryWhere ID. This could be used by other organizations to access your [place] medical records  [id number]         Blood Pressure from Last 3 Encounters:   02/15/17 103/53   02/07/17 119/74   02/01/17 130/69    Weight from Last 3 Encounters:   02/15/17 77.6 kg (171 lb)   02/07/17 78.2 kg (172 lb 6.4 oz)   02/01/17 78.9 kg (174 lb)              We Performed the Following          CBC with platelets differential     Comprehensive metabolic panel     Magnesium     Protein qualitative urine     Uric acid          Today's Medication Changes          These changes are accurate as of: 2/15/17 11:06 AM.  If you have any questions, ask your  nurse or doctor.               Start taking these medicines.        Dose/Directions    predniSONE 10 MG tablet   Commonly known as:  DELTASONE   Used for:  Pain of toe of right foot   Started by:  Valentina Haney APRN CNP        Dose:  30 mg   Take 3 tablets (30 mg) by mouth daily for 5 days   Quantity:  15 tablet   Refills:  0         These medicines have changed or have updated prescriptions.        Dose/Directions    * LORazepam 0.5 MG tablet   Commonly known as:  ATIVAN   This may have changed:  Another medication with the same name was added. Make sure you understand how and when to take each.   Used for:  Ovarian cancer, left (H), Encounter for long-term current use of medication        Dose:  0.5 mg   Take 1 tablet (0.5 mg) by mouth every 6 hours as needed (Anxiety, Nausea/Vomiting or Sleep)   Quantity:  30 tablet   Refills:  1       * LORazepam 1 MG tablet   Commonly known as:  ATIVAN   This may have changed:  You were already taking a medication with the same name, and this prescription was added. Make sure you understand how and when to take each.   Used for:  Ovarian cancer, unspecified laterality (H)   Changed by:  Valentina Haney APRN CNP        Dose:  1 mg   Take 1 tablet (1 mg) by mouth every 6 hours as needed (Anxiety, Nausea/Vomiting or Sleep)   Quantity:  90 tablet   Refills:  1       * Notice:  This list has 2 medication(s) that are the same as other medications prescribed for you. Read the directions carefully, and ask your doctor or other care provider to review them with you.         Where to get your medicines      These medications were sent to [address] [address]    Hours:  TRANSPLANT PHONE NUMBER [phone] Phone:  [phone]     predniSONE 10 MG tablet         Some of these will need a paper prescription and others can be bought over the counter.  Ask your nurse if you  have questions.     Bring a paper prescription for each of these medications     LORazepam 1 MG tablet                Primary Care Provider Office Phone # Fax #    [facility] [phone] [address] [facility] [id number]        Thank you!     Thank you for choosing [facility]  for your care. Our goal is always to provide you with excellent care. Hearing back from our patients is one way we can continue to improve our services. Please take a few minutes to complete the written survey that you may receive in the mail after your visit with us. Thank you!             Your Updated Medication List - Protect others around you: Learn how to safely use, store and throw away your medicines at www.disposemymeds.org.          This list is accurate as of: 2/15/17 11:06 AM.  Always use your most recent med list.                   Brand Name Dispense Instructions for use    * albuterol 108 (90 BASE) MCG/ACT Inhaler    PROAIR HFA/PROVENTIL HFA/VENTOLIN HFA     Inhale 2 puffs into the lungs       * albuterol 108 (90 BASE) MCG/ACT Inhaler    albuterol    1 Inhaler    Inhale 2 puffs into the lungs every 6 hours       BENADRYL PO      Take 50 mg by mouth daily as needed       dexamethasone 2 MG tablet    DECADRON    30 tablet    Take 1 tablet (2 mg) by mouth every morning       HYDROmorphone 2 MG tablet    DILAUDID    200 tablet    Take 1-2 tablets (2-4 mg) by mouth every 3 hours as needed (dyspnea and??/ or cough)       ipratropium - albuterol 0.5 mg/2.5 mg/3 mL 0.5-2.5 (3) MG/3ML neb solution    DUONEB    3 mL    Take 1 vial (3 mLs) by nebulization once for 1 dose In clinic neb       * LORazepam 0.5 MG tablet    ATIVAN    30 tablet    Take 1 tablet (0.5 mg) by mouth every 6 hours as needed (Anxiety, Nausea/Vomiting or Sleep)       * LORazepam 1 MG tablet    ATIVAN    90 tablet    Take 1 tablet (1 mg) by mouth every 6 hours as needed (Anxiety, Nausea/Vomiting or Sleep)        omeprazole 20 MG CR capsule    priLOSEC    90 capsule    Take 2 capsules (40 mg) by mouth daily       ondansetron 8 MG tablet    ZOFRAN    30 tablet    Take 1 tablet (8 mg) by mouth every 8 hours as needed for nausea       * order for DME     1 Device    Equipment being ordered: Oxygen 1-2 L NC Keep O2 sats > 90%. RA sats 88%       * order for DME     1 Device    Equipment being ordered: Oxygen 3 liters of oxygen continuous to keep sats >90%. RA sats 88%.       oxyCODONE 10 MG 12 hr tablet    OXYCONTIN    90 tablet    Take one tablet first thing in the morning, middle of the day and right before bed.       predniSONE 10 MG tablet    DELTASONE    15 tablet    Take 3 tablets (30 mg) by mouth daily for 5 days       * prochlorperazine 10 MG tablet    COMPAZINE    30 tablet    Take 1 tablet (10 mg) by mouth every 6 hours as needed (nausea/vomiting)       * prochlorperazine 5 MG tablet    COMPAZINE    30 tablet    Take 1 tablet (5 mg) by mouth every 6 hours as needed (Nausea/Vomiting)       senna 8.6 MG tablet    SENOKOT    120 tablet    Take 1-2 tablets by mouth 2 times daily as needed for constipation       sertraline 50 MG tablet    ZOLOFT    30 tablet    Take 1 tablet (50 mg) by mouth daily       sodium chloride 0.65 % nasal spray    OCEAN    88 mL    Spray 1 spray into both nostrils every 2 hours as needed for congestion       umeclidinium-vilanterol 62.5-25 MCG/INH oral inhaler    ANORO ELLIPTA    1 Inhaler    Inhale 1 puff into the lungs daily as needed Alternate with duoneb q6h Therapeutic interchange for Combivent Inhaler.       * warfarin 5 MG tablet    COUMADIN    72 tablet    Take 2.5 mg on Tues, Thurs, Sun and 5 mg Mon, Wed, Fri, Sat or as directed by anticoagulation clinic.       * warfarin 2.5 MG tablet    COUMADIN    60 tablet    Take one to two tablets daily or as directed by the Coumadin clinic       * Notice:  This list has 10 medication(s) that are the same as other medications prescribed for you. Read  the directions carefully, and ask your doctor or other care provider to review them with you.

## 2017-02-15 NOTE — PROGRESS NOTES
Chief Complaint   Patient presents with     Blood Draw     Labs drawn through [facility] and vital signs checked follow up for Ovarian cancer    Fernanda Lacy RN, OCN, BMTCN

## 2017-02-15 NOTE — MR AVS SNAPSHOT
After Visit Summary   2/15/2017    Etelvina Jacobs    MRN: [id number]           Patient Information     Date Of Birth          1950        Visit Information        Provider Department      2/15/2017 8:00 AM Valentina Haney APRN CNP [facility]        Today's Diagnoses     Ovarian cancer, left (H)    -  1    Ovarian cancer, right (H)        Encounter for long-term current use of medication        Ovarian cancer, unspecified laterality (H)        Pain of toe of right foot        SOB (shortness of breath)        Anxiety           Follow-ups after your visit        Your next 10 appointments already scheduled     Feb 22, 2017  9:30 AM CST   [facility] Lab Draw with UC MASONIC LAB DRAW   [facility] ([facility])    [address] [place] [address]  [address] [facility] [address]   [phone]            Feb 22, 2017 10:00 AM CST   Infusion 120 with UC ONCOLOGY INFUSION, UC 28 ATC   [facility] ([facility])    [address] [place] [address]  [address] [facility] [address]   [phone]            Mar 01, 2017 10:00 AM CST   Masonic Lab Draw with UC MASONIC LAB DRAW   [facility] Draw ([facility])    [address] [facility]  [address] [facility] [address]   [phone]            Mar 01, 2017 10:30 AM CST   Infusion 180 with UC ONCOLOGY INFUSION, UC 22 ATC   [facility] ([facility])    [address] [place] [address]   [phone]            Mar 07, 2017  1:30 PM CST   (Arrive by 1:15 PM)   Return Visit with Darlene Peck MD   [facility] ([facility])    [address] [facility]  [address] [facility] [address]   [phone]            Mar 08, 2017  9:30 AM CST   Masonic Lab Draw with UC MASONIC LAB DRAW   [facility] Lab Draw ([facility])    [address] [facility]  [address] [facility] [id number] [phone]            Mar 08, 2017 10:00 AM CST   Infusion 120 with UC ONCOLOGY INFUSION, UC 28 ATC   [facility] ([facility])    [address] [place] [address]  [address] [facility] [id number] [phone]              Future tests that were ordered for you today     Open Future Orders        Priority Expected Expires Ordered     Routine 4/1/2017 2/15/2018 2/15/2017    CT Chest/Abdomen/Pelvis w Contrast Routine 4/1/2017 2/15/2018 2/15/2017            Who to contact     If you have questions or need follow up information about today's clinic visit or your schedule please contact [facility] directly at [phone].  Normal or non-critical lab and imaging results will be communicated to you by [employer], letter or phone within 4 business days after the clinic has received the results. If you do not hear from us within 7 days, please contact the clinic through [employer] or phone. If you have a critical or abnormal lab result, we will notify you by phone as soon as possible.  Submit refill requests through [employer] or call your pharmacy and they will forward the refill request to us. Please allow 3 business days for your refill to be completed.          Additional Information About Your Visit        [employer] Information     [employer] gives you secure access to your electronic health record. If you see a primary care provider, you can also send messages to your care team and make appointments. If you have questions, please call your primary care clinic.  If you do not have a primary care provider, please call [phone] and they will assist you.        Care EveryWhere ID     This is your Care EveryWhere ID. This could be used by other organizations to access your [place] medical records  [id number]        Your Vitals Were     Pulse Temperature Respirations Pulse Oximetry BMI  (Body Mass Index)       114 99.7  F (37.6  C) (Tympanic) 20 97% 31.28 kg/m2        Blood Pressure from Last 3 Encounters:   02/15/17 103/53   02/07/17 119/74   02/01/17 130/69    Weight from Last 3 Encounters:   02/15/17 77.6 kg (171 lb)   02/07/17 78.2 kg (172 lb 6.4 oz)   02/01/17 78.9 kg (174 lb)                 Today's Medication Changes          These changes are accurate as of: 2/15/17 10:50 AM.  If you have any questions, ask your nurse or doctor.               Start taking these medicines.        Dose/Directions    predniSONE 10 MG tablet   Commonly known as:  DELTASONE   Used for:  Pain of toe of right foot   Started by:  Valentina Haney APRN CNP        Dose:  30 mg   Take 3 tablets (30 mg) by mouth daily for 5 days   Quantity:  15 tablet   Refills:  0         These medicines have changed or have updated prescriptions.        Dose/Directions    * LORazepam 0.5 MG tablet   Commonly known as:  ATIVAN   This may have changed:  Another medication with the same name was added. Make sure you understand how and when to take each.   Used for:  Ovarian cancer, left (H), Encounter for long-term current use of medication        Dose:  0.5 mg   Take 1 tablet (0.5 mg) by mouth every 6 hours as needed (Anxiety, Nausea/Vomiting or Sleep)   Quantity:  30 tablet   Refills:  1       * LORazepam 1 MG tablet   Commonly known as:  ATIVAN   This may have changed:  You were already taking a medication with the same name, and this prescription was added. Make sure you understand how and when to take each.   Used for:  Ovarian cancer, unspecified laterality (H)   Changed by:  Valentina Haney APRN CNP        Dose:  1 mg   Take 1 tablet (1 mg) by mouth every 6 hours as needed (Anxiety, Nausea/Vomiting or Sleep)   Quantity:  90 tablet   Refills:  1       * Notice:  This list has 2 medication(s) that are the same as other medications prescribed for you. Read the directions carefully, and ask your doctor or other care provider to  review them with you.         Where to get your medicines      These medications were sent to [facility] [place], [place] - [phone]  [phone], [facility] [address]    Hours:  TRANSPLANT PHONE NUMBER [phone] Phone:  [phone]     predniSONE 10 MG tablet         Some of these will need a paper prescription and others can be bought over the counter.  Ask your nurse if you have questions.     Bring a paper prescription for each of these medications     LORazepam 1 MG tablet                Primary Care Provider Office Phone # Fax #    [facility] [phone] [facility] [address]        Thank you!     Thank you for choosing [facility]  for your care. Our goal is always to provide you with excellent care. Hearing back from our patients is one way we can continue to improve our services. Please take a few minutes to complete the written survey that you may receive in the mail after your visit with us. Thank you!             Your Updated Medication List - Protect others around you: Learn how to safely use, store and throw away your medicines at www.disposemymeds.org.          This list is accurate as of: 2/15/17 10:50 AM.  Always use your most recent med list.                   Brand Name Dispense Instructions for use    * albuterol 108 (90 BASE) MCG/ACT Inhaler    PROAIR HFA/PROVENTIL HFA/VENTOLIN HFA     Inhale 2 puffs into the lungs       * albuterol 108 (90 BASE) MCG/ACT Inhaler    albuterol    1 Inhaler    Inhale 2 puffs into the lungs every 6 hours       BENADRYL PO      Take 50 mg by mouth daily as needed       dexamethasone 2 MG tablet    DECADRON    30 tablet    Take 1 tablet (2 mg) by mouth every morning       HYDROmorphone 2 MG tablet    DILAUDID    200 tablet    Take 1-2 tablets (2-4 mg) by mouth every 3 hours as needed (dyspnea and??/ or cough)       ipratropium -  albuterol 0.5 mg/2.5 mg/3 mL 0.5-2.5 (3) MG/3ML neb solution    DUONEB    3 mL    Take 1 vial (3 mLs) by nebulization once for 1 dose In clinic neb       * LORazepam 0.5 MG tablet    ATIVAN    30 tablet    Take 1 tablet (0.5 mg) by mouth every 6 hours as needed (Anxiety, Nausea/Vomiting or Sleep)       * LORazepam 1 MG tablet    ATIVAN    90 tablet    Take 1 tablet (1 mg) by mouth every 6 hours as needed (Anxiety, Nausea/Vomiting or Sleep)       omeprazole 20 MG CR capsule    priLOSEC    90 capsule    Take 2 capsules (40 mg) by mouth daily       ondansetron 8 MG tablet    ZOFRAN    30 tablet    Take 1 tablet (8 mg) by mouth every 8 hours as needed for nausea       * order for DME     1 Device    Equipment being ordered: Oxygen 1-2 L NC Keep O2 sats > 90%. RA sats 88%       * order for DME     1 Device    Equipment being ordered: Oxygen 3 liters of oxygen continuous to keep sats >90%. RA sats 88%.       oxyCODONE 10 MG 12 hr tablet    OXYCONTIN    90 tablet    Take one tablet first thing in the morning, middle of the day and right before bed.       predniSONE 10 MG tablet    DELTASONE    15 tablet    Take 3 tablets (30 mg) by mouth daily for 5 days       * prochlorperazine 10 MG tablet    COMPAZINE    30 tablet    Take 1 tablet (10 mg) by mouth every 6 hours as needed (nausea/vomiting)       * prochlorperazine 5 MG tablet    COMPAZINE    30 tablet    Take 1 tablet (5 mg) by mouth every 6 hours as needed (Nausea/Vomiting)       senna 8.6 MG tablet    SENOKOT    120 tablet    Take 1-2 tablets by mouth 2 times daily as needed for constipation       sertraline 50 MG tablet    ZOLOFT    30 tablet    Take 1 tablet (50 mg) by mouth daily       sodium chloride 0.65 % nasal spray    OCEAN    88 mL    Spray 1 spray into both nostrils every 2 hours as needed for congestion       umeclidinium-vilanterol 62.5-25 MCG/INH oral inhaler    ANORO ELLIPTA    1 Inhaler    Inhale 1 puff into the lungs daily as needed Alternate with  duoneb q6h Therapeutic interchange for Combivent Inhaler.       * warfarin 5 MG tablet    COUMADIN    72 tablet    Take 2.5 mg on Tues, Thurs, Sun and 5 mg Mon, Wed, Fri, Sat or as directed by anticoagulation clinic.       * warfarin 2.5 MG tablet    COUMADIN    60 tablet    Take one to two tablets daily or as directed by the Coumadin clinic       * Notice:  This list has 10 medication(s) that are the same as other medications prescribed for you. Read the directions carefully, and ask your doctor or other care provider to review them with you.

## 2017-02-15 NOTE — LETTER
2/15/2017       RE: Etelvina Jacobs  [address]  [place] [address]     Dear Colleague,    Thank you for referring your patient, Etelvina Jacobs, to the [facility]. Please see a copy of my visit note below.    Chief Complaint   Patient presents with     Blood Draw     Labs drawn through Portacath and vital signs checked follow up for Ovarian cancer    Fernanda Lacy RN, OCN, BMTCN    Gynecologic Oncology Follow-Up Note  RE: Etelvina Jacobs  MRN: [id number]  : 1950  Date of Visit: 02/15/2017    CC: Etelvina Jacobs is a 66 year old year old female with recurrent metastatic ovarian cancer and a history of VAIN III who presents today for follow up regarding disease management.    HPI: Etelvina comes to the clinic accompanied by her  Luc. She feels she has been tolerating chemotherapy fairly well but notes that her breathing is more difficult at times. She has been seeing palliative care for this and takes Oxycontin BID, Dilaudid PRN, and inhalers twice daily. She started dexamethasone roughly one week ago for her breathing. She notes a cough at times, no hemoptysis. She has been using home oxygen. Her nosebleeds have improved with the use of saline nasal spray. She did slip on the ice yesterday and twisted her right ankle but did not fall or hit her head. Her right big toe has been hurting for the past 24h and is her biggest source of discomfort today- she states she has had gout in the past and that this is consistent with prior episodes. She has not taken anything for gout before. She states her heartburn is improving, takes Tums as needed. Her appetite has decreased but she does not want to start an appetite stimulant at this time. She continues to have anxiety attacks, feels increased sertraline dose has been helpful and that she takes lorazepam roughly twice daily for anxiety with adequate control.  She does have neuropathy at baseline and occasional issues with walking and  dizziness for which she uses a cane. She denies worsening of her neuropathy. She has a history of brain mets treated with gamma knife and states she has occasional confusion with making decisions and poor short term memory but that she never forgets where she is, who she is, or feels unsafe. She adamantly states that she would like to continue with chemotherapy today.    Oncology History:  12/2009 The patient presented to urgent care at the [facility] 12/2009 where she was admitted for workup of abdominal distention and a mass. Her creatinine upon presentation was 4.7. [facility]. As part of her work up prior to surgery she had a colonoscopy and Mammogram performed both negative.    12/17/09 she underwent exploratory laparotomy, total abdominal hysterectomy bilateral salpingo-oophorectomy, bilateral pelvic lymph node dissection, bilateral periaortic lymph node biopsy, total omentectomy, peritoneal washings submitted for cytology, diaphragmatic scraping, staging biopsies of the peritoneum and bilateral uterolysis with Gyn Onc at [facility]. Her post operative course was complicated by ileus. She was discharged and [facility] after [facility]. Final pathology revealed stage IC grade 3 ovarian cancer. Initial  was 287.    2/11/11  -11  2/25/11:She is s/p 6 cycles of Carbo and Taxol, completed 5/14/10. CA-125 normalized at 9 on 6/11/2010. Also complains of anxiety which has increased with cancer diagnosis, she is seeing Psych regarding this and is not taking medications.    Vaginal dysplasia, she has a history of HSIL paps- last 6/11/2010. A subsequent colpo was performed revealing VAIN III at 2 oclock on the vaginal cuff. She then underwent EUA, vaginal bx and CO2 laser on 10/14/10 which the pathology returned as VAIN II-III. A repeat pap smear in January 2011 showed HSIL.    She underwent a follow up colposcopy with two upper vaginal  biopsies. She presents to discuss treatment plans. Pathology from the vaginal biopsy shows VAIN III. She was schedule for EUA and vaginectomy:  3/24/11 Vaginectomy for VAIN III    Pathology showed VAIN III, no evidence of malignancy    9/21/2011 evaluated in the emergency room due to shortness of breath, Chest x-ray showed a left lung lesion. Patient is a former smoker.    11/7/11 Pap done: (HSIL)    11/10/11 CT chest/abd/pelvis demonstrated: Increased size of bilateral pulmonary nodules and new pleural based nodule on the left as detailed above. There is also a new prominent left hilar lymph node. These findings are concerning for worsening metastatic disease. No evidence for metastatic disease in the abdomen or pelvis. Slight decrease in prominence of the left vaginal cuff. Resolution of the previously identified fluid collection in the right pelvis. Stable appearance of a small hypodense nodule in the left lobe of the thyroid.    11/16/11 Left Thoracoscopic Lower Lobe Wedge Resection with Pleura. Pathology showed Immunostains show that the tumor is strongly positive for CA-125, and also positive for WT-1, consistent with a high grade serous carcinoma. The tumor is negative for the pulmonary adenocarcinoma marker TTF-1, and also negative for the mesothelial marker calretinin. The results further confirm the diagnosis of metastatic ovarian carcinoma.    12/5/12: Disease: Per Dr Sexton: discussed chemotherapy plan of carbo/taxol with patient and  who are agreeable to this treatment. Discussed having chest port placed for chemotherapy as prior chemotherapy regimen of carbo/taxol was given peripherally and patient had some difficulty with this delivery option. Vaginal intraepithelial neoplasia- Will continue to monitor and plan to repeat pap once chemotherapy (6 cycles) have finished.    12/13/11:  - 41. Cycle 1 Carbo/Taxol    1/4/12:  - 18. Cycle 2. Left wrist cellulitis after chemo treated locally  "with antibiotics x 7 days. Port placement planned. Some bone pain after chemo. Occasional use of vicodin controls pain. Nausea treated x 14 days. No vomiting. Hair loss. Anxiety worse. Pt bought a \"therapy\" puppy. Has had counseling in past. Some vaginal discomfort which is unchanged. Not sexually active.  1/25/12:  - 13. Cycle 3. Mild nausea well controlled with Ativan. Port placed without problems. Pain controlled. Creatinine low but stable.    2/15/12:  - 12. Cycle 4. Bone pain x one week after chemo. Nausea x one week. Using meds with reasonable management of symptoms. Percocet and ativan refills requested. Port without complications.    3/7/12:  - 30. Cycle 5 Anxiety/depressed mood - using ativan rarely. Emotional liability, anxiety and depressed mood worsening since diagnosis. Start zoloft 50 mg qd - counseling. Increase dose if indicated.    HTN - labile - continue to monitor    3/28/12 Ca 125 17.Cycle 6 of chemotherapy  4/20/2012 CT scan: Decreased pulmonary lesions and no other new lesions noted  Significant decrease in size of a 5 mm right lower lobe nodule (series 3 image 42) previously 9-10 mm. Near-complete resolution of a rounded nodule previously seen along the medial aspect of the left major fissure (series 3 image 30).    7/20/12: CT Scan abdomen/pelvis: discussed with radiology - bilateral lung nodules continue to decrease in size with overall improving metastatic disease, no recurrence or new metastatic disease seen.    7/30/12: HSIL by pap    9/21/12: CA-125: 19.    10/19/12: Vaginal biopsy showed VAIN III. Recommendation for treatment with upper vaginal excision and CO2 laser to the vagina.  CA-125: 25  1/31/13: Colposcopy, excision of the left aspect of the upper vagina with vaginal biopsies, and CO2 laser of the upper vagina    Surgical pathology report:  A. Vagina, upper, biopsy  - High-grade squamous intraepithelial lesion / squamous cell carcinoma in-situ (VaIN 3)  - No " definitive evidence of invasion  B. Vagina, #2, biopsy  - High-grade squamous intraepithelial lesion / squamous cell carcinoma in-situ (VaIN 3)  - No definitive evidence of invasion  2/13/13 10 day course topical 5-FU    04/23/2013:  - 96. Pap LSIL.  4/30/13 CT C/A/P IMPRESSION:  1. New subcarinal and posterior mediastinal lymphadenopathy since 7/20/2012 concerning for new metastatic disease.  2. Stable bilateral lung nodules measuring up to 5 mm in size since 10/19/2012. No new pulmonary nodules.  3. No evidence of metastatic disease in the abdomen.  4. Moderate hiatal hernia, unchanged.  Decision made to start Doxil.  5/31/13-8/23/13: Cycle #1-4 Doxil.  87, 103, 92, 121.  9/16/13 CT C/A/P Impression:  1. Progressive enlargement of right paratracheal lymph nodes and stable size of subcarinal and posterior mediastinal lymphadenopathy. This remains concerning for metastases.  2. Stable bilateral lung nodules, measuring up to 5 mm.  3. No evidence of metastatic disease in the abdomen and pelvis.  4. Moderate hiatal hernia  9/18/13: Cycle 1 Avastin/cytoxan  10/9/13: Cycle 2 Avastin/cytoxan.  - 46. RF cytoxan and ativan. Mildly increased nausea which ativan relieves. More heartburn but using prilosec daily in am. Some vulvar symptoms.   10/30/13: Cycle 3 Avastin/cytoxan.  - 36.    11/18/13 CT C/A/P Impression:   1. Stable adenopathy within the mediastinum since 9/16/2013.  2. Pulmonary nodules measuring up to 5 mm in dimension, unchanged since 11/10/2011.  3. No evidence of metastatic disease within the abdomen and pelvis.  4. Moderate hiatal hernia.  5. Diverticulosis without CT evidence of diverticulitis.  11/20/13-1/2/14: Cycle #4-6 Avastin/Cytoxan.  36, 45, 46..  1/20/14 PET/CT IMPRESSION:   1. Several hypermetabolic and enlarged mediastinal lymph nodes concerning for metastatic disease from either the patient's ovarian or vaginal neoplasm. These lymph nodes have shown slowly progressive  increase in size.  2. Minimal metabolic uptake with associated soft tissue thickening of the left vaginal cuff representing treated residual tumor. Decision to switch to Carboplatin/Gemzar.  1/22/14: Cycle #1 Carboplatin/Gemzar; CA-125 33 - missed day #8 Gemzar due to thrombocytopenia. C/O nausea, vomiting, nose bleeds.  Pap: Epithelial Cell Abnormality: Squamous Cell: High-grade squamous intraepithelial lesion (HSIL) encompassing: moderate and severe dysplasia, carcinoma In Situ/ CIN2 and NEFTALY 3.  2/12/14: Cycle #2 Carbo/Gemzar (dose reduced). reaction to her chemotherapy on day one of cycle 2 of carboplatin therapy requiring ED visit. Had choking and shortness of breath.  - 38. Colpo: carcinoma in situ.    2/19/14: 5-FU cream prescribed for VAIN III. H. Pylori breath test ordered. Will plan to continue carboplatin/gemzar therapy with next dose inpatient so that duration of infusion can be increased.    3/6/14: Gemzar/carbo (inpt) cycle #3.  - 38.    3/26/14: Cycle #4 Gemzar/Carboplatin under desensitization; CA-125 25  4/14/14: PET/CT: IMPRESSION:    1. Positive response to therapy of metastatic ovarian cancer with decreased size and hypermetabolism of mediastinal lymphadenopathy.    2. No new recurrent or metastatic disease.    3. Red marrow activation in the spine and proximal long bones related to G-CSF injection  4/21/14-5/12/14: Cycle #5-6 Gemzar/Carboplatin under desensitization;   9, 21.  6/17/14 PET/CT Impression: In this patient with history of metastatic ovarian cancer there is evidence of worsening metastatic disease in the chest as several of the previously identified mediastinal nodes and a left hilar node have increased in metabolic activity and several have increased in size from comparison.  Etelvina Jacobs is a 63 year old woman with a diagnosis of recurrent, metastatic, stage IC grade 3 ovarian cancer and history of VAIN III s/p laser x 2 with history of recurrent high grade vaginal  dysplasia and s/p left lower lobe resection for pulmonary metastasis. She is here today for follow up after completion of six cycles Carbo/Gemzar. Recent PET/CT shows evidence of worsening metastatic disease in the chest as several of the previously identified mediastinal nodes and a left hilar node have increased in metabolic activity and several have increased in size from comparison.    Patient has had continuous chemotherapy since 2013 and discussion was had with patient that it is important to consider quality of life which is best achieved with some time off chemotherapy and then proceed with chemo in two months.    Reviewed various chemotherapy agents including consideration of clinical trial. In conclusion patient has not received topotecan chemotherapy and this is a reasonable chemotehrapy agent ( as either weekly or 5 x dily regiment). Additional counseling provided by the chemotherapy coordinator and discussion of treatment schedule.  8/25/14: Had good chemo break. No concerns other than anxiety about chemo and potential reactions. She has had counseling regarding chemo side effects with Topotecan. Ativan refill requested. Anxiety remains chronic with prn use of medication only as desired per pt. Not using effudex for VAIN - no bothersome symptoms. She wishes to continue to chemo therapy. No change in medications or history since last seen. Ca-125: 32  8/25-8/29/14: Cycle 1 Topotecan,  - 19  3 days of diarrhea after Neulasta shot-possible gastroenteritis.  9/15/14: Cycle 2 of topotecan.  10/6/14: Cycle 3 of 3. Topotecan.  - 22. Feels well. Some bone pain after neulasta. Uses few medications and wishes to avoid if possible for symptom managment. No diarrhea this cycle. Fatigue first week post chemo then improves. Continues to desire chemo treatments. Mood stable. No new concerns.    10/29/14: CT C/A/P: Mediastinal lymphadenopathy including a 1.4x1.7 cm right paratracheal lymph node, previously  measured at 0.8x1.5 cm. IMPRESSION: Slight worsening in mediastinal lymphadenopathy, with no evidence of distant metastasis in abdomen or pelvis, in this patient with history of metastatic ovarian cancer.    Etelvina Jacobs is a 63 year old woman with a diagnosis of recurrent, metastatic, stage IC grade 3 ovarian cancer and history of VAIN III s/p laser x 2 with history of recurrent high grade vaginal dysplasia and s/p left lower lobe resection for pulmonary metastasis. She is here today for CT results after chemo treatment. Discussed results with slightly mediastinal lymphadenopathy.  Continue 3 more cycles of topotecan. Then repeat imaging.    11/3/14: Pap: HSIL; Ca 125 25  11/17/14: Topetecan #4  12/8/14: Topetecan #5  - 19.    12/11/14: Colposcopy after 11/3 pap was HSIL. Her mood is poor and she is not currently on medication for depression. She has used Zoloft in the past, which has worked well for her. Started 25 mg dose; declines counseling.    Vagina, 8:00 and 12:00, colposcopic biopsy:  -Vaginal intraepithelial neoplasia 3/high grade squamous intraepithelial lesion  -No invasive carcinoma identified (see comment)  12/29/14: Topetecan #6 Topetecan.  -17. Pt on Zoloft 25 mg. Anxiety improved with some initial sleepiness which has now resolved. Had been on 50 mg in the past and she wishes to increase dose. Vaginal biopsy VAIN III and surgical consult pending with Dr Markham. Pt using neulasta. No new concerns. Weight gain pt attributes to eating more.    1/13/15: CT C/A/P  IMPRESSION:    1. Stable disease with stable mediastinal lymphadenopathy, presumed to be metastatic ovarian cancer. No significant change in small right hilar lymph nodes.  2. No evidence of metastatic disease in the abdomen and pelvis on this noncontrast evaluation.  =15  4/20/15: CT C/A/P IMPRESSION:   1. Overall worsening metastatic ovarian cancer. Specifically, there is increasing size and number of multiple pulmonary  nodules, increasing size of mediastinal lymph nodes, and increasing size of soft tissue nodule in the right mesorectum since 1/13/2015.  2. Large hiatal hernia again seen.     4/28/15-7/23/15: Cycle #1-3 weekly Taxol  58, 31, 26.  9/10/15: admitted for bilateral PE; started on Lovenox BID. DC 9/11/15.  9/15/15: Heparin 10A 2.66 (peripheral). Instructed to hold evening dose of 9/16 and am dose of 9/17.  9/17/15: Cycle #4 weekly Taxol.  32.    11/2/15: CT c/a/p  IMPRESSION:  1. Increase in size of pulmonary nodules and mediastinal/hilar  lymphadenopathy since 4/20/2015, without significant change from the  chest CT of 9/10/2015 as above. Previously seen bilateral pulmonary  emboli are not appreciated on the current exam.    2. No evidence of disease progression in the abdomen/pelvis in this  patient with a history of metastatic ovarian cancer.    11/12/15: C5 D1 Taxol/Avastin.  32  11/19/15: C5D8 Taxol.  11/27/15: C5D15 Taxol.  12/3/15: C5D22 Taxol/Avastin.  12/10/15: C5D29 Taxol.  12/17/15: C5D36 Taxol.  12/21/15: 1L NS IV.    1/8/16: CT c/a/p  Impression:    In this patient with a history of ovarian cancer previously metastatic  to the chest:  1. Since 11/2/2015, some mediastinal lymph nodes are slightly  decreased in size and some are unchanged. Nodules/nodes on the left  are decreased as described above. Additional indeterminate sub-4 mm  pulmonary nodules are stable. Attention on followup imaging is  recommended.  2. No evidence for disease progression in the abdomen or pelvis.    12/21/15: admitted for L great toe pain, possible brain mets with hemmorrhage.  24 hour events:    - IVF resuscitation  - MRI showing possible hemorrhagic metastases vs. Vascular incidents      1/25/16:Neurosurgery consult  continue observation    3/15/2016 - Right frontal lesion, she underwent Gamma Knife Radiosurgery in March 15, 2016.  At the time of GKR, the thin cut MRI demonstrated a smaller left frontal lesion  as well, which was not seen in the previous MRI, likely due to the thickness of the image slices.  Therefore, she underwent treatment of the two lesions, right frontal and left frontal lesions.     4/11/2016: CT scan  . There is new ill-defined hazy fat stranding in the central pelvis  without well-defined nodularity on this exam. Findings are  nonspecific, but may be represent developing metastatic deposits.  Attention on followup imaging is recommended.  2. Minimal increase in a subcentimeter left lower lobe pleural-based  metastatic deposit, otherwise no significant disease progression in  the chest.    Patient Name: FRANCISCA GRACE   MR#: [id number]   Specimen #: [id number]   Collected: 4/22/2016   Received: 4/22/2016   Reported: 4/25/2016 12:15   Ordering Phy(s): ALEXEI GARCES     SPECIMEN(S):   Vaginal biopsy     FINAL DIAGNOSIS:   Vagina, biopsy:   -High grade squamous intraepithelial lesion (vaginal intraepithelial   neoplasia 3, VaIN 3)   -Fragmented specimen   -Most tissue fragments consist of epithelium only (cannot assess for the   presence or absence of invasive carcinoma)   -See comment     10/27/16  Complains of SOB, cough, low-grade fever for the past two months. Has worsened over the last three weeks and was diagnosed with bronchitis at [facility]. Was provided with an inhaler; no antibiotics. Additionally has had a 10 pound weight loss over the last month. Currently on warfarin for history of PE. Denies chest pain, abdominal pain, nausea, constipation, diarrhea, problems with urination, changes in vision, speech, hearing.     Rad-onc visit in September 2016 showed continued treatment effect for brain mets with no obvious new metastatic lesions.      11/3/2016  Hospitalized on 10/27/16 for acute SOB. Discharge on 3L of oxygen  CT on admission:  1. Negative for pulmonary embolus.  2. In this patient with history of ovarian cancer and previous  metastatic disease to the chest, there is evidence for  "significantly  advanced metastatic disease within the chest when compared with exam  dated 4/11/2016. 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 Superimposed infection could be  a consideration in the appropriate clinical context.  3. Worsening perirectal adenopathy and increased size of upper  abdominal lymph nodes consistent with metastatic progression.  4. Moderate hiatal hernia is stable.    Patient has continued needing 3L of oxygen at home. Has not been able to do much due to shortness of breath and fatigue. Notes that she frequently \"panics\" which makes her breathing worse. Mentions that she does not wish resuscitation or intubation.    11/9/16: C1D1 weekly Taxol with biweekly Avastin.  143.  12/28/16: C2D1 weekly Taxol with biweekly Avastin.  44.  2/15/17: C3D1 weekly Taxol with biweekly Avastin.  pending.      Past Medical History   Diagnosis Date     Anxiety      Depression      Gastro-oesophageal reflux disease      Hyperlipidemia      Hypertension      Ovarian cancer (H) 12/09     Stage IC grade 3 ovarian cancer     Pulmonary nodules      Renal disease      insuffiency     Shortness of breath      VAIN III (vaginal intraepithelial neoplasia grade III) 3/24/11       Past Surgical History   Procedure Laterality Date     Vulva surgery  3/24/11     VAIN III     Hysterectomy radical  12/09     Stage IC grade 3 ovarian cancer,     Thoracoscopic wedge resection lung  11/16/2011     Procedure:THORACOSCOPIC WEDGE RESECTION LUNG; Left Thoracoscopic  Lower Lobe Wedge Resection with Pleura; Surgeon:TAMARA SERVIN; Location:[facility] OR     Biopsy vaginal  1/31/2013     Procedure: BIOPSY VAGINAL;  Colposcopy, CO2 Laser to Vagina, Upper Vaginal biopsies;  Surgeon: Marietta Sexton MD;  Location: [facility] OR     Laser co2 vagina  " 1/31/2013     Procedure: LASER CO2 VAGINA;;  Surgeon: Marietta Sexton MD;  Location: [facility] OR       Current Outpatient Prescriptions   Medication     oxyCODONE (OXYCONTIN) 10 MG 12 hr tablet     dexamethasone (DECADRON) 2 MG tablet     albuterol (PROAIR HFA/PROVENTIL HFA/VENTOLIN HFA) 108 (90 BASE) MCG/ACT Inhaler     warfarin (COUMADIN) 2.5 MG tablet     LORazepam (ATIVAN) 1 MG tablet     HYDROmorphone (DILAUDID) 2 MG tablet     sertraline (ZOLOFT) 50 MG tablet     sodium chloride (OCEAN) 0.65 % nasal spray     umeclidinium-vilanterol (ANORO ELLIPTA) 62.5-25 MCG/INH oral inhaler     albuterol (ALBUTEROL) 108 (90 BASE) MCG/ACT Inhaler     LORazepam (ATIVAN) 0.5 MG tablet     prochlorperazine (COMPAZINE) 5 MG tablet     senna (SENOKOT) 8.6 MG tablet     order for DME     warfarin (COUMADIN) 5 MG tablet     ipratropium - albuterol 0.5 mg/2.5 mg/3 mL (DUONEB) 0.5-2.5 (3) MG/3ML nebulization     DiphenhydrAMINE HCl (BENADRYL PO)     ondansetron (ZOFRAN) 8 MG tablet     omeprazole (PRILOSEC) 20 MG capsule     order for DME     prochlorperazine (COMPAZINE) 10 MG tablet     No current facility-administered medications for this visit.           Allergies   Allergen Reactions     Carboplatin Difficulty breathing     Reaction to Carboplatin on 8th dose.        Family History   Problem Relation Age of Onset     Cancer - colorectal Paternal Grandmother      CANCER Paternal Aunt      stomach     C.A.D. Father      Hypertension Mother      DIABETES Mother        Social History     Social History     Marital status:      Spouse name: N/A     Number of children: N/A     Years of education: N/A     Occupational History     Not on file.     Social History Main Topics     Smoking status: Former Smoker     Smokeless tobacco: Never Used      Comment: Patient hasn't smoked for over 5 years 04/23/15     Alcohol use No      Comment: no alcohol use     Drug use: No     Sexual activity: No     Other Topics Concern     Not on file      Social History Narrative       ROS  General: + weakness and weight loss, decrease in appetite. Denies fatigue, night sweats, hot flashes, fever, chills, or difficulty sleeping  HEENT: + spots/floaters and epistaxis. Denies headaches, hair loss, visual difficulty or disturbances, diplopia, masses, head injury, tinnitus, hearing loss, epistaxis, congestion, problems with teeth or gums, dysphonia, or dysphagia  Pulmonary: + cough, shortness of breath, dyspnea on exertion. Denies  hemoptysis, wheezing, or allergies  Cardiovascular: Denies chest pain, fainting, palpitations, murmurs, activity intolerance, swelling in legs, or high blood pressure  Gastrointestinal: + heartburn. Denies nausea, vomiting, constipation, diarrhea, abdominal pain, bloating, melena, hematochezia, or jaundice  Genitourinary: Denies dysuria, urinary urgency or frequency, hematuria, cloudy or malodorous urine, incontinence, repeat urinary tract infections, flank pain, pelvic pain, vaginal bleeding, vaginal discharge, or vaginal dryness  Sexual Function: Denies pain with intercourse, changes in libido, arousal difficulty, or changes in orgasm  Integumentary: Denies rashes, sores, changing moles, or scarring  Hematologic: Denies swollen lymph nodes, masses, easy bruising, or easy bleeding  Musculoskeletal: Denies falls, back pain, myalgias, arthralgias, stiffness, muscle weakness or muscle cramps  Neurologic: + trouble walking, numbness/tingling. Denies changes in memory, dizziness, seizures, or tremors  Psychiatric: + anxiety and nervousness. Denies depression, mood changes, suicidal thoughts, or difficulty concentrating  Endocrine: Denies polydipsia, polyuria, temperature intolerance, or history of thyroid disease      Physical Exam:    /53 (BP Location: Right arm, Patient Position: Chair, Cuff Size: Adult Regular)  Pulse 114  Temp 99.7  F (37.6  C) (Tympanic)  Resp 20  Wt 77.6 kg (171 lb)  SpO2 97%  BMI 31.28 kg/m2    CONSTITUTIONAL:  Alert non-toxic appearing female in no acute distress  HEAD: Normocephalic, atraumatic  EYES: PERRLA; no scleral icterus  ENT: Oropharynx pink without lesions  NECK: Neck supple without lymphadenopathy  RESPIRATORY: Lungs clear to auscultation, no increased work of breathing noted  CV: Tachycardic with regular rhythm, S1S2, no clicks, murmurs, rubs, or gallops; bilateral lower extremities with trace edema, dorsalis pedis pulses 2+ bilaterally  GASTROINTESTINAL: Normoactive bowel sounds x4 quadrants, abdomen soft, non-distended, and non-tender to palpation without masses or organomegaly  GENITOURINARY: Not indicated  LYMPHATIC: Cervical, supraclavicular, and inguinal nodes without lymphadenopathy  MUSCULOSKELETAL: Moves all extremities, no obvious muscle wasting; L ankle with full ROM, no ecchymosis, no tenderness to palpation; R ankle with full ROM, no ecchymosis, no tenderness to palpation; R podagra with warmth, erythema, and tenderness to palpation at MTP- remaining toes without warmth, erythema, or tenderness  NEUROLOGIC: No gross deficits, sitting in wheelchair  SKIN: Appropriate color for race, warm and dry, no rashes or lesions to unclothed skin  PSYCHIATRIC: Pleasant and interactive, affect bright, makes appropriate eye contact, thought process linear    Labs:      2/15/2017  Day 1   Hemoglobin 11.7 - 15.7 g/dL 8.5 (A)   Hematocrit 35.0 - 47.0 % 28.4 (A)   Platelet Count 150 - 450 10e9/L 297   Absolute Neutrophil 1.6 - 8.3 10e9/L 6.1   Sodium 133 - 144 mmol/L 140   Potassium 3.4 - 5.3 mmol/L 3.7   Chloride 94 - 109 mmol/L 105   Carbon Dioxide 20 - 32 mmol/L 25   Urea Nitrogen 7 - 30 mg/dL 10   Creatinine 0.52 - 1.04 mg/dL 1.34 (A)   Calcium 8.5 - 10.1 mg/dL 8.8   Magnesium 1.6 - 2.3 mg/dL 1.7   Bilirubin Total 0.2 - 1.3 mg/dL 0.4   ALT 0 - 50 U/L 12   AST 0 - 45 U/L 18   Alkaline Phosphatase 40 - 150 U/L 75   Albumin 3.4 - 5.0 g/dL 2.9 (A)   Protein Total 6.8 - 8.8 g/dL 6.4 (A)   Uric Acid 2.6 - 6.0 mg/dL  7.2 (A)   WBC 4.0 - 11.0 10e9/L 8.2      pending    Assessment/Plan:  1) Recurrent metastatic ovarian cancer: Proceed with cycle three of weekly Taxol with every 2 weeks Avastin. To receive total of 3 cycles followed by imaging and treatment planning with Dr. Markham. Creatinine elevated, however, this appears to be per her recent baseline. Discussed her oral intake- declines an appetite stimulant at this time. Encouraged small frequent meals that are calorically dense. To continue to see palliative care. Reviewed signs and symptoms for when she should contact the clinic or seek additional care, including but not limited to fever, chills, inability to keep down food or fluids, nausea and vomiting not controlled with antiemetics, and diarrhea leading to dehydration. Patient to contact the clinic with any questions or concerns in the interim. Reinforced with patient that she never needs to proceed with chemotherapy if she does not want to and if she ever wants to switch to hospice she may.  2) Anxiety: Reports mild to moderate improvement in anxiety. Continue with Zoloft 50mg PO daily and Ativan 1mg q6h PRN for anxiety/nausea/sleep. Declines counseling at this time.  3) Difficulty breathing: Most likely due to lung metastases- I do not suspect pneumonia at this time as her lungs are clear, her breathing appears non-labored, and her labs are largely normal per her baseline. To continue with management per palliative care.  4) Right toe pain: Suspicious for gout, uric acid elevated- I do not suspect septic arthritis at this time given her normal WBC count and the fact that it is limited to her R podagra. After discussion with pharmacy, will have her start prednisone 30mg PO x5 days. To continue taking dexamethasone daily for her breathing- this was reviewed with pharmacy.  5) Health maintenance issues discussed include to follow up with PCP for non-gynecologic concerns and co-morbid conditions. To continue  following up with palliative care.  6) Patient verbalized understanding of and agreement with plan    A total of 35 minutes of face to face time were spent with the patient with over 50% of that time spent in counseling, coordination of care, education, and symptom management.    LEANDRA Billy, FNP-C  Division of Gynecologic Oncology  [facility]  Pager: [phone]

## 2017-02-17 NOTE — TELEPHONE ENCOUNTER
Called to advised patient MD okay with 3 tabs dilaudid at a time if needed and request patient keep log of meds, but was unable to reach her. Left VM with information and with my contact phone # for any further questions/concerns.

## 2017-02-21 NOTE — TELEPHONE ENCOUNTER
Attempted to call patient one more time to check in regarding medications, but unable to reach her. Left her a VM and advised she rene call back if needed.

## 2017-02-22 NOTE — MR AVS SNAPSHOT
After Visit Summary   2/22/2017    Etelvina Jacobs    MRN: [id number]           Patient Information     Date Of Birth          1950        Visit Information        Provider Department      2/22/2017 10:00 AM  28 ATC; [facility] ONCOLOGY INFUSION [facility]        Today's Diagnoses     Ovarian cancer, left (H)    -  1    Encounter for long-term current use of medication        Deep vein thrombosis (DVT) (H)          Care Instructions    Contact Numbers    [facility] [place] [phone]  [facility] [place]:  [phone]    Call triage with chills and/or temperature greater than or equal to 100.5, uncontrolled nausea/vomiting, diarrhea, constipation, dizziness, shortness of breath, chest pain, bleeding, unexplained bruising, or any new/concerning symptoms, questions/concerns.     If you are having any concerning symptoms or wish to speak to a provider before your next infusion visit, please call your care coordinator or triage to notify them so we can adequately serve you.       After Hours: [phone]    If after hours, weekends, or holidays, call main hospital  and ask for Oncology doctor on call.         February 2017 Sunday Monday Tuesday Wednesday Thursday Friday Saturday                  1     [facility]   10:00 AM   (15 min.)    [facility] DRAW   [facility] Lab Draw     [facility] ONC INFUSION 120   10:30 AM   (120 min.)    ONCOLOGY INFUSION   [facility] 2     3     4       5     6     7     [facility] RETURN   12:45 PM   (30 min.)   Darlene Peck MD   [facility] 8     9     10     11       12     13     14     15     [facility]    7:30 AM   (15 min.)   [facility]   [facility] Lab Draw     [facility] RETURN ACTIVE TREATMENT    7:45 AM   (40 min.)   Valentina Haney APRN CNP   [facility]     [facility] ONC INFUSION 180    9:00 AM   (180 min.)   [facility] ONCOLOGY INFUSION   [facility] 16     17     18       19     20     21     22     [facility] DRAW    9:30 AM   (15 min.)    [facility] DRAW   [facility] Draw     [facility] ONC INFUSION 120   10:00 AM   (120 min.)    ONCOLOGY INFUSION   [facility] 23     24     25       26     27     28 March 2017 Sunday Monday Tuesday Wednesday Thursday Friday Saturday                  1     [facility] DRAW   10:00 AM   (15 min.)    [facility]     [facility] ONC INFUSION 180   10:30 AM   (180 min.)    ONCOLOGY INFUSION   [facility] 2     3     4       5     6     7     [facility] RETURN    1:15 PM   (30 min.)   Darlene Peck MD   [facility] 8     [facility] DRAW    9:30 AM   (15 min.)    [facility] LAB DRAW   [facility] Lab Draw     [facility] ONC INFUSION 120   10:00 AM   (120 min.)    ONCOLOGY INFUSION   [facility] 9     10     11       12     13     14     15     [facility] DRAW   10:00 AM   (15 min.)    [facility]   [facility]     [facility] ONC INFUSION 180   10:30 AM   (180 min.)    ONCOLOGY INFUSION   [facility] 16     17     18       19     20     21     22     [facility] DRAW   10:00 AM   (15 min.)    [facility]   [facility]     [facility] ONC INFUSION 120   10:30 AM   (120 min.)    ONCOLOGY INFUSION   [facility] 23     24     25       26     27     28     29     [facility] DRAW    7:30 AM   (15 min.)    [facility] LAB DRAW   [facility] Lab Draw     [facility] RETURN ACTIVE TREATMENT    8:05 AM   (40 min.)   Valentina Haney APRN CNP   [facility]     [facility] ONC INFUSION 180    9:00 AM   (180 min.)    ONCOLOGY INFUSION   [facility] 30     31                       Lab Results:  Recent Results (from the past 12 hour(s))   CBC with platelets differential     Collection Time: 02/22/17  9:51 AM   Result Value Ref Range    WBC 12.8 (H) 4.0 - 11.0 10e9/L    RBC Count 3.55 (L) 3.8 - 5.2 10e12/L    Hemoglobin 9.1 (L) 11.7 - 15.7 g/dL    Hematocrit 29.9 (L) 35.0 - 47.0 %    MCV 84 78 - 100 fl    MCH 25.6 (L) 26.5 - 33.0 pg    MCHC 30.4 (L) 31.5 - 36.5 g/dL    RDW 24.0 (H) 10.0 - 15.0 %    Platelet Count 354 150 - 450 10e9/L    Diff Method Automated Method     % Neutrophils 91.1 %    % Lymphocytes 4.0 %    % Monocytes 3.0 %    % Eosinophils 0.3 %    % Basophils 0.2 %    % Immature Granulocytes 1.4 %    Nucleated RBCs 0 0 /100    Absolute Neutrophil 11.7 (H) 1.6 - 8.3 10e9/L    Absolute Lymphocytes 0.5 (L) 0.8 - 5.3 10e9/L    Absolute Monocytes 0.4 0.0 - 1.3 10e9/L    Absolute Eosinophils 0.0 0.0 - 0.7 10e9/L    Absolute Basophils 0.0 0.0 - 0.2 10e9/L    Abs Immature Granulocytes 0.2 0 - 0.4 10e9/L    Absolute Nucleated RBC 0.0    Magnesium    Collection Time: 02/22/17  9:51 AM   Result Value Ref Range    Magnesium 2.0 1.6 - 2.3 mg/dL   Potassium    Collection Time: 02/22/17  9:51 AM   Result Value Ref Range    Potassium 3.8 3.4 - 5.3 mmol/L   INR    Collection Time: 02/22/17  9:51 AM   Result Value Ref Range    INR 2.42 (H) 0.86 - 1.14             Follow-ups after your visit        Your next 10 appointments already scheduled     Mar 01, 2017 10:00 AM CST   [facility] Draw with  [facility] DRAW   [facility] Lab Draw ([facility])    [address] [place] [id number] [phone]            Mar 01, 2017 10:30 AM CST   Infusion 180 with  ONCOLOGY INFUSION,  22 ATC   [facility] ([facility])    [address] [place] [address]  2nd Floor  [facility] [address]   [phone]            Mar 07, 2017  1:30 PM CST   (Arrive by 1:15 PM)   Return Visit with Darlene Peck MD   [facility] ([facility])    [address] [place] [address]  [facility] [address]   [phone]            Mar 08, 2017  9:30 AM CST   [facility] Lab Draw with [facility] DRAW   [facility] ([facility])    [address] [place] [address]   [phone]            Mar 08, 2017 10:00 AM CST   Infusion 120 with [facility] ONCOLOGY INFUSION, UC 28 ATC   [facility] ([facility])    [address] [place] [id number] [phone]            Mar 15, 2017 10:00 AM CDT   [facility] Lab Draw with [facility] DRAW   [facility] ([facility])    [address] [place] [address]   [phone]            Mar 15, 2017 10:30 AM CDT   Infusion 180 with UC ONCOLOGY INFUSION, UC 22 ATC   [facility] ([facility])    [address] [place] [id number] [phone]              Who to contact     If you have questions or need follow up information about today's clinic visit or your schedule please contact [facility] directly at [phone].  Normal or non-critical lab and imaging results will be communicated to you by MyChart, letter or phone within 4 business days after the clinic has received the results. If you do not hear from us within 7 days, please contact the clinic through MyChart or phone. If you have a critical or abnormal lab result, we will notify you by phone as soon as possible.  Submit refill requests through [employer] or call your pharmacy and they will forward the refill request to us. Please allow 3 business days for your refill to be completed.          Additional Information About Your Visit        [employer] Information     [employer] gives you secure access to your electronic health record. If you see a primary care provider, you can also send messages to your care team and make  appointments. If you have questions, please call your primary care clinic.  If you do not have a primary care provider, please call [phone] and they will assist you.        Care EveryWhere ID     This is your Care EveryWhere ID. This could be used by other organizations to access your [place] medical records  [id number]        Your Vitals Were     Pulse Temperature Respirations Pulse Oximetry BMI (Body Mass Index)       98 98.5  F (36.9  C) (Oral) 16 98% 31.15 kg/m2        Blood Pressure from Last 3 Encounters:   02/22/17 128/69   02/15/17 103/53   02/07/17 119/74    Weight from Last 3 Encounters:   02/22/17 77.2 kg (170 lb 4.8 oz)   02/15/17 77.6 kg (171 lb)   02/07/17 78.2 kg (172 lb 6.4 oz)              We Performed the Following     CBC with platelets differential     INR     Magnesium     Potassium     Treatment Conditions        Primary Care Provider Office Phone # Fax #    [facility] [phone] [id number] [facility] [address]        Thank you!     Thank you for choosing [facility]  for your care. Our goal is always to provide you with excellent care. Hearing back from our patients is one way we can continue to improve our services. Please take a few minutes to complete the written survey that you may receive in the mail after your visit with us. Thank you!             Your Updated Medication List - Protect others around you: Learn how to safely use, store and throw away your medicines at www.disposemymeds.org.          This list is accurate as of: 2/22/17 11:46 AM.  Always use your most recent med list.                   Brand Name Dispense Instructions for use    * albuterol 108 (90 BASE) MCG/ACT Inhaler    PROAIR HFA/PROVENTIL HFA/VENTOLIN HFA     Inhale 2 puffs into the lungs       * albuterol 108 (90 BASE) MCG/ACT Inhaler    albuterol    1 Inhaler    Inhale 2 puffs into the lungs every 6 hours       BENADRYL PO      Take 50 mg by mouth  daily as needed       dexamethasone 2 MG tablet    DECADRON    30 tablet    Take 1 tablet (2 mg) by mouth every morning       HYDROmorphone 2 MG tablet    DILAUDID    200 tablet    Take 1-2 tablets (2-4 mg) by mouth every 3 hours as needed (dyspnea and??/ or cough)       ipratropium - albuterol 0.5 mg/2.5 mg/3 mL 0.5-2.5 (3) MG/3ML neb solution    DUONEB    3 mL    Take 1 vial (3 mLs) by nebulization once for 1 dose In clinic neb       * LORazepam 0.5 MG tablet    ATIVAN    30 tablet    Take 1 tablet (0.5 mg) by mouth every 6 hours as needed (Anxiety, Nausea/Vomiting or Sleep)       * LORazepam 1 MG tablet    ATIVAN    90 tablet    Take 1 tablet (1 mg) by mouth every 6 hours as needed (Anxiety, Nausea/Vomiting or Sleep)       omeprazole 20 MG CR capsule    priLOSEC    90 capsule    Take 2 capsules (40 mg) by mouth daily       ondansetron 8 MG tablet    ZOFRAN    30 tablet    Take 1 tablet (8 mg) by mouth every 8 hours as needed for nausea       * order for DME     1 Device    Equipment being ordered: Oxygen 1-2 L NC Keep O2 sats > 90%. RA sats 88%       * order for DME     1 Device    Equipment being ordered: Oxygen 3 liters of oxygen continuous to keep sats >90%. RA sats 88%.       oxyCODONE 10 MG 12 hr tablet    OXYCONTIN    90 tablet    Take one tablet first thing in the morning, middle of the day and right before bed.       * prochlorperazine 10 MG tablet    COMPAZINE    30 tablet    Take 1 tablet (10 mg) by mouth every 6 hours as needed (nausea/vomiting)       * prochlorperazine 5 MG tablet    COMPAZINE    30 tablet    Take 1 tablet (5 mg) by mouth every 6 hours as needed (Nausea/Vomiting)       senna 8.6 MG tablet    SENOKOT    120 tablet    Take 1-2 tablets by mouth 2 times daily as needed for constipation       sertraline 50 MG tablet    ZOLOFT    30 tablet    Take 1 tablet (50 mg) by mouth daily       sodium chloride 0.65 % nasal spray    OCEAN    88 mL    Spray 1 spray into both nostrils every 2 hours as  needed for congestion       umeclidinium-vilanterol 62.5-25 MCG/INH oral inhaler    ANORO ELLIPTA    1 Inhaler    Inhale 1 puff into the lungs daily as needed Alternate with duoneb q6h Therapeutic interchange for Combivent Inhaler.       * warfarin 5 MG tablet    COUMADIN    72 tablet    Take 2.5 mg on Tues, Thurs, Sun and 5 mg Mon, Wed, Fri, Sat or as directed by anticoagulation clinic.       * warfarin 2.5 MG tablet    COUMADIN    60 tablet    Take one to two tablets daily or as directed by the Coumadin clinic       * Notice:  This list has 10 medication(s) that are the same as other medications prescribed for you. Read the directions carefully, and ask your doctor or other care provider to review them with you.

## 2017-02-22 NOTE — PROGRESS NOTES
ANTICOAGULATION FOLLOW-UP CLINIC VISIT    Patient Name:  Etelvina Jacobs  Date:  2/22/2017  Contact Type:  Telephone    SUBJECTIVE:        OBJECTIVE    INR   Date Value Ref Range Status   02/22/2017 2.42 (H) 0.86 - 1.14 Final       ASSESSMENT / PLAN  INR assessment THER    Recheck INR In: 2 WEEKS    INR Location Clinic      Anticoagulation Summary as of 2/22/2017     INR goal 2.0-3.0   Today's INR 2.42   Maintenance plan 1.25 mg (2.5 mg x 0.5) on Tue; 2.5 mg (2.5 mg x 1) all other days   Full instructions 1.25 mg on Tue; 2.5 mg all other days   Weekly total 16.25 mg   No change documented Keiko Spencer RN   Plan last modified Leana Castaneda RN (2/1/2017)   Next INR check 3/8/2017   Priority INR   Target end date     Indications   Long-term (current) use of anticoagulants [Z79.01] [Z79.01]  Deep vein thrombosis (DVT) (H) [I82.409] [I82.409]         Anticoagulation Episode Summary     INR check location     Preferred lab     Send INR reminders to [facility]    Comments Has 5mg and 2.5mg tablets       Anticoagulation Care Providers     Provider Role Specialty Phone number    Alberta Deal MD [facility] [phone]            See the Encounter Report to view Anticoagulation Flowsheet and Dosing Calendar (Go to Encounters tab in chart review, and find the Anticoagulation Therapy Visit)    Left message for patient (on home phone) with results and dosing recommendations. Asked patient to call back to report any missed doses, falls, signs and symptoms of bleeding or clotting, any changes in health, medication, or diet. Asked patient to call back with any questions or concerns.      Keiko Spencer, RN

## 2017-02-22 NOTE — NURSING NOTE
Chief Complaint   Patient presents with     Port Draw     Labs collectec via port by RN.     Port accessed, flushed and locked with NS and Heparin.  Olimpia Stock RN

## 2017-02-22 NOTE — PATIENT INSTRUCTIONS
Contact Numbers    [facility] [place] Main [place]: [phone]  [facility] [place] Triage:  [phone]    Call triage with chills and/or temperature greater than or equal to 100.5, uncontrolled nausea/vomiting, diarrhea, constipation, dizziness, shortness of breath, chest pain, bleeding, unexplained bruising, or any new/concerning symptoms, questions/concerns.     If you are having any concerning symptoms or wish to speak to a provider before your next infusion visit, please call your care coordinator or triage to notify them so we can adequately serve you.       After Hours: [phone]    If after hours, weekends, or holidays, call main hospital  and ask for Oncology doctor on call.         February 2017 Sunday Monday Tuesday Wednesday Thursday Friday Saturday                  1     [facility]   10:00 AM   (15 min.)   [facility]     [facility] ONC INFUSION 120   10:30 AM   (120 min.)    ONCOLOGY INFUSION   [facility] 2     3     4       5     6     7     [facility] RETURN   12:45 PM   (30 min.)   Darlene Peck MD   [facility] 8     9     10     11       12     13     14     15     [facility]    7:30 AM   (15 min.)   [facility]   [facility] Lab Draw     [facility] RETURN ACTIVE TREATMENT    7:45 AM   (40 min.)   Valentina Haney APRN CNP   [facility]     [facility] ONC INFUSION 180    9:00 AM   (180 min.)    ONCOLOGY INFUSION   [facility] 16     17     18       19     20     21     22     [facility]    9:30 AM   (15 min.)   [facility]     [facility] ONC INFUSION 120   10:00 AM   (120 min.)    ONCOLOGY INFUSION   [facility] 23     24     25       26     27     28                                    March 2017 Sunday Monday Tuesday Wednesday Thursday Friday Saturday                  1     [facility]   10:00 AM   (15 min.)   [facility]  [facility] DRAW   [facility] Draw     [facility] ONC INFUSION 180   10:30 AM   (180 min.)    ONCOLOGY INFUSION   [facility] 2     3     4       5     6     7     [facility] RETURN    1:15 PM   (30 min.)   Darlene Peck MD   [facility] 8     [facility] DRAW    9:30 AM   (15 min.)    [facility] LAB DRAW   [facility] Draw     [facility] ONC INFUSION 120   10:00 AM   (120 min.)    ONCOLOGY INFUSION   [facility] 9     10     11       12     13     14     15     [facility]   10:00 AM   (15 min.)    [facility]     [facility] ONC INFUSION 180   10:30 AM   (180 min.)    ONCOLOGY INFUSION   [facility] 16     17     18       19     20     21     22     [facility]   10:00 AM   (15 min.)    [facility] LAB DRAW   [facility] Lab Draw     [facility] ONC INFUSION 120   10:30 AM   (120 min.)    ONCOLOGY INFUSION   [facility] 23     24     25       26     27     28     29     [facility]    7:30 AM   (15 min.)    [facility] LAB DRAW   [facility] Lab Draw     [facility] RETURN ACTIVE TREATMENT    8:05 AM   (40 min.)   Valentina Haney APRN CNP   [facility]     [facility] ONC INFUSION 180    9:00 AM   (180 min.)    ONCOLOGY INFUSION   [facility] 30     31                       Lab Results:  Recent Results (from the past 12 hour(s))   CBC with platelets differential    Collection Time: 02/22/17  9:51 AM   Result Value Ref Range    WBC 12.8 (H) 4.0 - 11.0 10e9/L    RBC Count 3.55 (L) 3.8 - 5.2 10e12/L    Hemoglobin 9.1 (L) 11.7 - 15.7 g/dL    Hematocrit 29.9 (L) 35.0 - 47.0 %    MCV 84 78 - 100 fl    MCH 25.6 (L) 26.5 - 33.0 pg    MCHC 30.4 (L) 31.5 - 36.5 g/dL    RDW 24.0 (H) 10.0 - 15.0 %    Platelet Count 354 150 - 450 10e9/L    Diff Method Automated Method     % Neutrophils 91.1 %    % Lymphocytes 4.0 %    % Monocytes 3.0 %     % Eosinophils 0.3 %    % Basophils 0.2 %    % Immature Granulocytes 1.4 %    Nucleated RBCs 0 0 /100    Absolute Neutrophil 11.7 (H) 1.6 - 8.3 10e9/L    Absolute Lymphocytes 0.5 (L) 0.8 - 5.3 10e9/L    Absolute Monocytes 0.4 0.0 - 1.3 10e9/L    Absolute Eosinophils 0.0 0.0 - 0.7 10e9/L    Absolute Basophils 0.0 0.0 - 0.2 10e9/L    Abs Immature Granulocytes 0.2 0 - 0.4 10e9/L    Absolute Nucleated RBC 0.0    Magnesium    Collection Time: 02/22/17  9:51 AM   Result Value Ref Range    Magnesium 2.0 1.6 - 2.3 mg/dL   Potassium    Collection Time: 02/22/17  9:51 AM   Result Value Ref Range    Potassium 3.8 3.4 - 5.3 mmol/L   INR    Collection Time: 02/22/17  9:51 AM   Result Value Ref Range    INR 2.42 (H) 0.86 - 1.14

## 2017-02-22 NOTE — MR AVS SNAPSHOT
Etelvina Jacobs   2/22/2017   Anticoagulation Therapy Visit    Description:  66 year old female   Provider:  Keiko Spencer, RN   Department:  [facility]           INR as of 2/22/2017     Today's INR 2.42      Anticoagulation Summary as of 2/22/2017     INR goal 2.0-3.0   Today's INR 2.42   Full instructions 1.25 mg on Tue; 2.5 mg all other days   Next INR check 3/8/2017    Indications   Long-term (current) use of anticoagulants [Z79.01] [Z79.01]  Deep vein thrombosis (DVT) (H) [I82.409] [I82.409]         February 2017 Details    Sun Mon Tue Wed Thu Fri Sat        1               2               3               4                 5               6               7               8               9               10               11                 12               13               14               15               16               17               18                 19               20               21               22      2.5 mg   See details      23      2.5 mg         24      2.5 mg         25      2.5 mg           26      2.5 mg         27      2.5 mg         28      1.25 mg              Date Details   02/22 This INR check               How to take your warfarin dose     To take:  1.25 mg Take 0.5 of a 2.5 mg tablet.    To take:  2.5 mg Take 1 of the 2.5 mg tablets.           March 2017 Details    Sun Mon Tue Wed Thu Fri Sat        1      2.5 mg         2      2.5 mg         3      2.5 mg         4      2.5 mg           5      2.5 mg         6      2.5 mg         7      1.25 mg         8            9               10               11                 12               13               14               15               16               17               18                 19               20               21               22               23               24               25                 26               27               28               29               30               31                 Date Details    No additional details    Date of next INR:  3/8/2017         How to take your warfarin dose     To take:  1.25 mg Take 0.5 of a 2.5 mg tablet.    To take:  2.5 mg Take 1 of the 2.5 mg tablets.

## 2017-02-22 NOTE — PROGRESS NOTES
Infusion Nursing Note:  Etelvina Jacobs presents today for Cycle 3, day 8 Taxol.    Patient seen by provider today: No    Note: Patient presents to clinic with c/o worsening, occasionally productive (yellow sputum) cough, and recorded some temps in 99s this past week.  TORB 2/22/2017 Valentina Haney NP/TA Valles RN: Okay to administer Taxol with WBC=12.8 and c/o worsening cough.  Instruct patient to ED for any fever greater than or equal to 100.4, increased or change in SOB.  Patient verbalized understanding.    Intravenous Access:  Implanted Port.    Treatment Conditions:  Lab Results   Component Value Date    HGB 9.1 02/22/2017     Lab Results   Component Value Date    WBC 12.8 02/22/2017      Lab Results   Component Value Date    ANEU 11.7 02/22/2017     Lab Results   Component Value Date     02/22/2017      Lab Results   Component Value Date     02/15/2017                   Lab Results   Component Value Date    POTASSIUM 3.8 02/22/2017           Lab Results   Component Value Date    MAG 2.0 02/22/2017            Lab Results   Component Value Date    CR 1.34 02/15/2017                   Lab Results   Component Value Date    LUDWIG 8.8 02/15/2017                Lab Results   Component Value Date    BILITOTAL 0.4 02/15/2017           Lab Results   Component Value Date    ALBUMIN 2.9 02/15/2017                    Lab Results   Component Value Date    ALT 12 02/15/2017           Lab Results   Component Value Date    AST 18 02/15/2017     Results reviewed, labs MET treatment parameters, ok to proceed with treatment.    Post Infusion Assessment:  Patient tolerated infusion without incident.  Blood return noted pre and post infusion.  Site patent and intact, free from redness, edema or discomfort.  No evidence of extravasations.  Access discontinued per protocol.    Discharge Plan:   Patient declined prescription refills.  Discharge instructions reviewed with: Patient.  Patient and/or family verbalized  understanding of discharge instructions and all questions answered.  Copy of AVS reviewed with patient and/or family.  Patient will return 3/1/2017 for next appointment.  Departure Mode: Ambulatory.    Katarina Valles RN

## 2017-02-25 NOTE — MR AVS SNAPSHOT
After Visit Summary   2/25/2017    Etelvina Jacobs    MRN: [id number]           Patient Information     Date Of Birth          1950        Visit Information        Provider Department      2/25/2017 1:20 PM Mary Llanos MD [facility]        Today's Diagnoses     Wheezing    -  1    Acute bronchitis with coexisting condition requiring prophylactic treatment        Ovarian cancer, unspecified laterality (H)        Encounter for long-term current use of medication           Follow-ups after your visit        Your next 10 appointments already scheduled     Mar 01, 2017 10:00 AM CST   [facility] Draw with [facility]RAW   [facility] Draw ([facility])    [address] [place] [id number]   [phone]            Mar 01, 2017 10:30 AM CST   Infusion 180 with UC ONCOLOGY INFUSION, UC 22 ATC   [facility] ([facility])    [address] [place] [address]   [phone]            Mar 07, 2017  1:30 PM CST   (Arrive by 1:15 PM)   Return Visit with Darlene Peck MD   [facility] ([facility])    [address] [place] [address]   [phone]            Mar 08, 2017  9:30 AM CST   [facility] Lab Draw with [facility] DRAW   [facility] Draw ([facility])    [address] [place] [id number]   [phone]            Mar 08, 2017 10:00 AM CST   Infusion 120 with UC ONCOLOGY INFUSION, UC 28 ATC   [facility] ([facility])    [address] [place] [address]   [phone]            Mar 15, 2017 10:00 AM CDT   [facility] Draw with [facility]   [facility]    [address] [place] [address]  Floor  [facility] [address] [phone]            Mar 15, 2017 10:30 AM CDT   Infusion 180 with UC ONCOLOGY INFUSION, UC 22 ATC   [facility] ([facility])    [address] [facility] [address] [facility]  2nd Floor  [facility] [id number] [phone]              Who to contact     If you have questions or need follow up information about today's clinic visit or your schedule please contact [facility] directly at [phone].  Normal or non-critical lab and imaging results will be communicated to you by Vusionhart, letter or phone within 4 business days after the clinic has received the results. If you do not hear from us within 7 days, please contact the clinic through [employer] or phone. If you have a critical or abnormal lab result, we will notify you by phone as soon as possible.  Submit refill requests through sofatronic or call your pharmacy and they will forward the refill request to us. Please allow 3 business days for your refill to be completed.          Additional Information About Your Visit        Vusionhart Information     sofatronic gives you secure access to your electronic health record. If you see a primary care provider, you can also send messages to your care team and make appointments. If you have questions, please call your primary care clinic.  If you do not have a primary care provider, please call [phone] and they will assist you.        Care EveryWhere ID     This is your Care EveryWhere ID. This could be used by other organizations to access your [place] medical records  [id number]        Your Vitals Were     Pulse Temperature Pulse Oximetry BMI (Body Mass Index)          107 99.8  F (37.7  C) (Tympanic) 92% 30.22 kg/m2         Blood Pressure from Last 3 Encounters:   02/25/17 137/78   02/22/17 128/69   02/15/17 103/53    Weight from Last 3 Encounters:   02/25/17 165 lb 4 oz (75 kg)   02/22/17 170 lb 4.8 oz (77.2 kg)   02/15/17 171 lb (77.6 kg)               We Performed the Following     Influenza A/B antigen          Today's Medication Changes          These changes are accurate as of: 2/25/17  9:01 PM.  If you have any questions, ask your nurse or doctor.               Start taking these medicines.        Dose/Directions    cefdinir 300 MG capsule   Commonly known as:  OMNICEF   Used for:  Wheezing   Started by:  Mary Llanos MD        Dose:  300 mg   Take 1 capsule (300 mg) by mouth daily for 10 days   Quantity:  10 capsule   Refills:  0       predniSONE 20 MG tablet   Commonly known as:  DELTASONE   Used for:  Wheezing   Started by:  Mary Llanos MD        20mg/daily: 3 tablets the first 3 days, then 2 tabs the next 3 days, then 1 tab daily for the last 3 days   Quantity:  18 tablet   Refills:  0       spacer/aero-hold chamber mask Alexandra   Used for:  Wheezing   Started by:  Mary Llanos MD        1 Adult size spacer to use with MDI inhalers.   Quantity:  1 each   Refills:  0         These medicines have changed or have updated prescriptions.        Dose/Directions    * albuterol 108 (90 BASE) MCG/ACT Inhaler   Commonly known as:  PROAIR HFA/PROVENTIL HFA/VENTOLIN HFA   This may have changed:  Another medication with the same name was added. Make sure you understand how and when to take each.   Used for:  Ovarian cancer, left (H), Malignant neoplasm metastatic to lung, unspecified laterality (H)   Changed by:  Erica Markham MD        Dose:  2 puff   Inhale 2 puffs into the lungs   Refills:  0       * albuterol 108 (90 BASE) MCG/ACT Inhaler   Commonly known as:  albuterol   This may have changed:  Another medication with the same name was added. Make sure you understand how and when to take each.   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 Make sure you understand how and when to take each.   Used for:  Wheezing   Changed by:  Mary Llanos MD        Dose:  2 puff   Inhale 2 puffs into the lungs every 4 hours as needed for shortness of breath / dyspnea or wheezing Use with spacer   Quantity:  1 Inhaler   Refills:  0       * albuterol (2.5 MG/3ML) 0.083% neb solution   This may have changed:  You were already taking a medication with the same name, and this prescription was added. Make sure you understand how and when to take each.   Used for:  Wheezing   Changed by:  Mary Llanos MD        Dose:  1 vial   Take 1 vial (2.5 mg) by nebulization every 6 hours as needed for shortness of breath / dyspnea or wheezing   Quantity:  1 Box   Refills:  3       * order for DME   This may have changed:  Another medication with the same name was added. Make sure you understand how and when to take each.   Used for:  PE (pulmonary embolism)        Equipment being ordered: Oxygen 1-2 L NC Keep O2 sats > 90%. RA sats 88%   Quantity:  1 Device   Refills:  0       * order for DME   This may have changed:  Another medication with the same name was added. Make sure you understand how and when to take each.   Used for:  Shortness of breath, Pulmonary nodule, SOB (shortness of breath)        Equipment being ordered: Oxygen 3 liters of oxygen continuous to keep sats >90%. RA sats 88%.   Quantity:  1 Device   Refills:  0       * order for DME   This may have changed:  You were already taking a medication with the same name, and this prescription was added. Make sure you understand how and when to take each.   Used for:  Wheezing   Changed by:  Mary Llanos MD         Equipment being ordered: Nebulizer   Quantity:  1 Units   Refills:  0       * Notice:  This list has 7 medication(s) that are the same as other medications prescribed for you. Read the directions carefully, and ask your doctor or other care provider to review them with you.         Where to get your medicines      These medications were sent to [id number] - [place], [place] - [address] [address]  [address] [facility] [address]    Hours:  24-hours Phone:  [phone]     albuterol (2.5 MG/3ML) 0.083% neb solution    albuterol 108 (90 BASE) MCG/ACT Inhaler    cefdinir 300 MG capsule    predniSONE 20 MG tablet    spacer/aero-hold chamber mask Alexandra         Some of these will need a paper prescription and others can be bought over the counter.  Ask your nurse if you have questions.     Bring a paper prescription for each of these medications     order for DME                Primary Care Provider Office Phone # Fax #    [facility] [phone] [address] [address] [address]        Thank you!     Thank you for choosing [facility]  for your care. Our goal is always to provide you with excellent care. Hearing back from our patients is one way we can continue to improve our services. Please take a few minutes to complete the written survey that you may receive in the mail after your visit with us. Thank you!             Your Updated Medication List - Protect others around you: Learn how to safely use, store and throw away your medicines at www.disposemymeds.org.          This list is accurate as of: 2/25/17  9:01 PM.  Always use your most recent med list.                   Brand Name Dispense Instructions for use    * albuterol 108 (90 BASE) MCG/ACT Inhaler    PROAIR HFA/PROVENTIL HFA/VENTOLIN HFA     Inhale 2 puffs into the lungs       * albuterol 108 (90 BASE) MCG/ACT Inhaler    albuterol    1 Inhaler     Inhale 2 puffs into the lungs every 6 hours       * albuterol 108 (90 BASE) MCG/ACT Inhaler    PROAIR HFA/PROVENTIL HFA/VENTOLIN HFA    1 Inhaler    Inhale 2 puffs into the lungs every 4 hours as needed for shortness of breath / dyspnea or wheezing Use with spacer       * albuterol (2.5 MG/3ML) 0.083% neb solution     1 Box    Take 1 vial (2.5 mg) by nebulization every 6 hours as needed for shortness of breath / dyspnea or wheezing       BENADRYL PO      Take 50 mg by mouth daily as needed       cefdinir 300 MG capsule    OMNICEF    10 capsule    Take 1 capsule (300 mg) by mouth daily for 10 days       dexamethasone 2 MG tablet    DECADRON    30 tablet    Take 1 tablet (2 mg) by mouth every morning       HYDROmorphone 2 MG tablet    DILAUDID    200 tablet    Take 1-2 tablets (2-4 mg) by mouth every 3 hours as needed (dyspnea and??/ or cough)       * ipratropium - albuterol 0.5 mg/2.5 mg/3 mL 0.5-2.5 (3) MG/3ML neb solution    DUONEB    3 mL    Take 1 vial (3 mLs) by nebulization once for 1 dose In clinic neb       * DUONEB 0.5-2.5 (3) MG/3ML neb solution   Generic drug:  ipratropium - albuterol 0.5 mg/2.5 mg/3 mL     1 vial    Take 1 vial (3 mLs) by nebulization daily       * LORazepam 0.5 MG tablet    ATIVAN    30 tablet    Take 1 tablet (0.5 mg) by mouth every 6 hours as needed (Anxiety, Nausea/Vomiting or Sleep)       * LORazepam 1 MG tablet    ATIVAN    90 tablet    Take 1 tablet (1 mg) by mouth every 6 hours as needed (Anxiety, Nausea/Vomiting or Sleep)       omeprazole 20 MG CR capsule    priLOSEC    90 capsule    Take 2 capsules (40 mg) by mouth daily       ondansetron 8 MG tablet    ZOFRAN    30 tablet    Take 1 tablet (8 mg) by mouth every 8 hours as needed for nausea       * order for DME     1 Device    Equipment being ordered: Oxygen 1-2 L NC Keep O2 sats > 90%. RA sats 88%       * order for DME     1 Device    Equipment being ordered: Oxygen 3 liters of oxygen continuous to keep sats >90%. RA sats  88%.       * order for DME     1 Units    Equipment being ordered: Nebulizer       oxyCODONE 10 MG 12 hr tablet    OXYCONTIN    90 tablet    Take one tablet first thing in the morning, middle of the day and right before bed.       predniSONE 20 MG tablet    DELTASONE    18 tablet    20mg/daily: 3 tablets the first 3 days, then 2 tabs the next 3 days, then 1 tab daily for the last 3 days       * prochlorperazine 10 MG tablet    COMPAZINE    30 tablet    Take 1 tablet (10 mg) by mouth every 6 hours as needed (nausea/vomiting)       * prochlorperazine 5 MG tablet    COMPAZINE    30 tablet    Take 1 tablet (5 mg) by mouth every 6 hours as needed (Nausea/Vomiting)       senna 8.6 MG tablet    SENOKOT    120 tablet    Take 1-2 tablets by mouth 2 times daily as needed for constipation       sertraline 50 MG tablet    ZOLOFT    30 tablet    Take 1 tablet (50 mg) by mouth daily       sodium chloride 0.65 % nasal spray    OCEAN    88 mL    Spray 1 spray into both nostrils every 2 hours as needed for congestion       spacer/aero-hold chamber mask Alexandra     1 each    1 Adult size spacer to use with MDI inhalers.       umeclidinium-vilanterol 62.5-25 MCG/INH oral inhaler    ANORO ELLIPTA    1 Inhaler    Inhale 1 puff into the lungs daily as needed Alternate with duoneb q6h Therapeutic interchange for Combivent Inhaler.       * warfarin 5 MG tablet    COUMADIN    72 tablet    Take 2.5 mg on Tues, Thurs, Sun and 5 mg Mon, Wed, Fri, Sat or as directed by anticoagulation clinic.       * warfarin 2.5 MG tablet    COUMADIN    60 tablet    Take one to two tablets daily or as directed by the Coumadin clinic       * Notice:  This list has 15 medication(s) that are the same as other medications prescribed for you. Read the directions carefully, and ask your doctor or other care provider to review them with you.

## 2017-02-25 NOTE — PROGRESS NOTES
SUBJECTIVE:                                                    Etelvina Jacobs is a 66 year old female who presents to clinic today for the following health issues:      RESPIRATORY SYMPTOMS      Duration: started yesterday    Description  nasal congestion, rhinorrhea, facial pain/pressure, cough, wheezing, headache, fatigue/malaise and phlegm coming out - greenish/yellow    Severity: mild    Accompanying signs and symptoms: None    History (predisposing factors):  none    Precipitating or alleviating factors: None    Therapies tried and outcome:  None-not sure what she can take     Has oxygen at home and is supposed to use it as needed    Has been having sinus pressure and her eyes pressure and greeenish discharge   Started yesterday  Also having shortness of breath and wheezing  Has had a low grade fever all day last night.   Chest pain or exertional shortness of breath: NO  Exposure to pertussis or pertussis like symptoms: NO  Orthopnea, worsening edema, pnd: NO  Rash: NO  Tried OTC medications without relief  Worsening symptoms hence patient came in to be seen     Problem list and histories reviewed & adjusted, as indicated.  Additional history: as documented    Problem list, Medication list, Allergies, and Medical/Social/Surgical histories reviewed in [facility] and updated as appropriate.    ROS:  Constitutional, HEENT, cardiovascular, pulmonary, gi and gu systems are negative, except as otherwise noted.    OBJECTIVE:                                                    /78  Pulse 107  Temp 99.8  F (37.7  C) (Tympanic)  Wt 165 lb 4 oz (75 kg)  SpO2 92%  BMI 30.22 kg/m2  Body mass index is 30.22 kg/(m^2).  GENERAL: healthy, alert and no distress  EYES: Pink palpebral conjunctiva, anicteric sclera  ENT: midline nasal septum normal ear exam. Normal sinuses  NECK: no adenopathy, no asymmetry, masses, or scars and thyroid normal to palpation  RESP: symmetrical chest expansion, no retractions, increased expiratory  phase wheezes heard at bilateral lungs allthroughout  CV: regular rate and rhythm, normal S1 S2, no S3 or S4, no murmur, click or rub, no peripheral edema and peripheral pulses strong  SKIN: no readily visible rashes noted  MS: no gross musculoskeletal defects noted    Diagnostic Test Results:  Results for orders placed or performed in visit on 02/25/17 (from the past 24 hour(s))   Influenza A/B antigen   Result Value Ref Range    Influenza A/B Agn Specimen Nasal     Influenza A Negative NEG    Influenza B  NEG     Negative   Test results must be correlated with clinical data. If necessary, results   should be confirmed by a molecular assay or viral culture.          ASSESSMENT/PLAN:                                                        ICD-10-CM    1. Wheezing R06.2 ipratropium - albuterol 0.5 mg/2.5 mg/3 mL (DUONEB) 0.5-2.5 (3) MG/3ML neb solution     Influenza A/B antigen     albuterol (PROAIR HFA/PROVENTIL HFA/VENTOLIN HFA) 108 (90 BASE) MCG/ACT Inhaler     Spacer/Aero-Holding Chambers (SPACER/AERO-HOLD CHAMBER MASK) SHERI     predniSONE (DELTASONE) 20 MG tablet     cefdinir (OMNICEF) 300 MG capsule     albuterol (2.5 MG/3ML) 0.083% neb solution     order for DME   2. Acute bronchitis with coexisting condition requiring prophylactic treatment J20.9    3. Ovarian cancer, unspecified laterality (H) C56.9    4. Encounter for long-term current use of medication Z79.899        Prescribed with above.  Patient refilled albuterol however requested nebs. Neb machine given instructions done  Patient had duoneb and had good relief of wheezing  Prescribed with pred taper and omnicef  Aware meds will interact with her coumadin. Patient will discuss with inr nurses   Follow up with primary care provider recommended in 3 days  Advised that prolonged cough > 3 weeks recommend chest xray, offered today, declined.   Adverse reactions of medications discussed.  Over the counter medications discussed.   Aware to come back in if  "with worsening symptoms or if no relief despite treatment plan  Patient voiced understanding and had no further questions.     Patient ovarian cancer and started crying as she was worried as she states her primary care provider started recommending hospice  No thoughts of harming self or others   She states that she is worried that this means that they are \"giving up\"  I discussed that I am not in the position to commend on her need for hospice however I did inform her that a lot of patients who go on hospice receive so much better access to care and services that their health and quality of life significantly improve. Patient voiced understanding and would discuss with her primary care provider     MD Mary Victoria MD  [facility]    "

## 2017-02-25 NOTE — NURSING NOTE
"Chief Complaint   Patient presents with     Sinus Problem     sx started yesterday       Initial /78  Pulse 107  Temp 99.8  F (37.7  C) (Tympanic)  Wt 165 lb 4 oz (75 kg)  SpO2 92%  BMI 30.22 kg/m2 Estimated body mass index is 30.22 kg/(m^2) as calculated from the following:    Height as of 2/7/17: 5' 2\" (1.575 m).    Weight as of this encounter: 165 lb 4 oz (75 kg).  Medication Reconciliation: complete   Celi Garg CMA      "

## 2017-02-25 NOTE — NURSING NOTE
The following medication was given:     MEDICATION: Ipratropium Albuterol Inhalation Solution  ROUTE: inhalation/nebulizer  SITE: inhalation/nebulizer  DOSE: 3mL  LOT #: [id number]  :  [employer]  EXPIRATION DATE:  10/2017  NDC#: 6236-1225-03   Celi Garg CMA

## 2017-02-26 NOTE — TELEPHONE ENCOUNTER
Call Type: Triage Call    Presenting Problem: Pt in [facility] today and diagnosed with a sinus  infection she is wondering if the cefdinir she was prescribed will  interfer with her warfarin.  According to [employer] there is a good  change that there could be an interaction.  Paged on call oncologist  via the switchboard.  Paged on call via the switchboard to call pt  at 7:10 P.M.  Triage Note:  Guideline Title: Medication Questions - Adult ; Medication Questions -  Adult  Recommended Disposition: Speak with Provider or Pharmacist  within 24 hours  Original Inclination: Would have called clinic  Override Disposition: Call Provider Immediately  Intended Action: Call PCP/HCP  Physician Contacted: No  Has questions about prescribed and/or nonprescribed medications not covered by  available resources ?  YES  Pregnant and has medication questions regarding prescribed and/or nonprescribed  medication(s) not covered by available resources ? NO  Breastfeeding and has medication questions regarding prescribed and/or  nonprescribed medication(s) not covered by available resources ? NO  Sign(s) or symptom(s) associated with a diagnosed condition or with a new illness  ? NO  Prescription ordered today and not available at pharmacy putting patient at  clinical risk ? NO  Recurrence of a symptom(s) or illness post prescribed medication treatment AND  provider instructed patient to call if symptom(s) returned. ? NO  Unable to obtain prescribed medication related to available resources AND  situation poses immediate clinical risk ? NO  Pharmacy calling to clarify prescription order. ? NO  Requests refill of prescribed medication that does NOT have a valid refill; lack  of medication may cause clinical risk to patient if not available. ? NO  Requests refill of prescribed medication without valid refills OR requests refill  of prescribed medication with valid refills but does not have prescription number  (no RX container); lack of  medication does not put patient at clinical risk ? NO  Physician Instructions:  Care Advice: Safe Use of Medications: - Keep a list of your medications,  listing both brand and generic names, the prescribed dosage, common side  effects, recommended action for side effects, when to call their provider,  and any other specific instructions. This medication list should be updated  if any prescriptions change or if new medications are added. Your list  should include nonprescription medications, vitamins and supplements. An  online resource for a medication list is:  http://www.ahrq.gov/patients-consumers/diagnosis-treatment/treatments/safeme  ds/walletform.html or  http://www.ahrq.gov/patients-consumers/diagnosis-treatment/treatments/safeme  ds/yourmeds.pdf     Ask about your medications interaction with other  medications, supplements or foods. - Take the medication list to each  provider visit, especially if seeing more than one doctor. Make note of any  known allergies on this list. - Use your medication exactly as directed.  Any concerns about side effects should be discussed with your pharmacist or  prescribing provider before changing doses or stopping the medication. -  Don't chew or crush tablets or open capsules unless told to do so.  Some  long-acting medications can be absorbed too quickly when chewed or crushed.  Other medications either won't be effective or could cause side effects. -  If you have difficulty swallowing pills, ask your provider or the  pharmacist if the medication is available in liquid form. - For liquid  medications, only use measuring device that came with it or was provided by  the pharmacy. Household teaspoons and tablespoons can be inaccurate. -  Never take anyone else's medication.

## 2017-02-26 NOTE — TELEPHONE ENCOUNTER
Telephone encounter  Paged by Rn line re: patient's questions.     Called Etelvina on cell # provided: [phone]. Patient identified herself.     Etelvina had questions regarding affect on INR of medications she received for her URI today in clinic.  She was started on cefepime daily and steroid burst after being seen today in clinic.  She was wondering if she could start these medications today. Instructed patient that it is ok to use these medications as directed.     Per patient, her URI symptoms of cough and congestion has been ongoing for at least several days to a week. Some low-grade fevers.  She is recently s/p Cycle #3, Day 8 taxol on 02/22/17 for metastatic ovarian cancer with brain and lung mets and lymphangitic carcinomatosis.    Patient states she has oxygen at home and is using it.  Shortness of breath has been present.  Unsure if it is worsening.  She has been seen by palliative care for dyspnea and cough.  Directed patient to present to the ED for fever >100.4deg F, or worsening shortness of breath.     Patient stated understanding.     Carol Elizondo MD MPH  OB/GYN, PGY2  Pager: [phone]  2/25/2017  7:34 PM

## 2017-02-27 NOTE — PROGRESS NOTES
Etelvina was started on omnicef and prednisone.  She has a scheduled infusion on 3/1 and will have her INR tested then.  According to the literature the omnicef can have an increased risk of bleeding and prednisone can either increase or decrease the INR.  Spoke with Etelvina and she reports feeling much better today.

## 2017-02-27 NOTE — MR AVS SNAPSHOT
Etelvina Jacobs   2/27/2017   Anticoagulation Therapy Visit    Description:  66 year old female   Provider:  Leana Castaneda, RN   Department:  [facility]           INR as of 2/27/2017     Today's INR No new INR was available at the time of this encounter.      Anticoagulation Summary as of 2/27/2017     INR goal 2.0-3.0   Today's INR No new INR was available at the time of this encounter.   Full instructions 1.25 mg on Tue; 2.5 mg all other days   Next INR check 3/1/2017    Indications   Long-term (current) use of anticoagulants [Z79.01] [Z79.01]  Deep vein thrombosis (DVT) (H) [I82.409] [I82.409]         February 2017 Details    Sun Mon Tue Wed Thu Fri Sat        1               2               3               4                 5               6               7               8               9               10               11                 12               13               14               15               16               17               18                 19               20               21               22               23               24               25                 26               27      2.5 mg   See details      28      1.25 mg              Date Details   02/27 This INR check               How to take your warfarin dose     To take:  1.25 mg Take 0.5 of a 2.5 mg tablet.    To take:  2.5 mg Take 1 of the 2.5 mg tablets.           March 2017 Details    Sun Mon Tue Wed Thu Fri Sat        1            2               3               4                 5               6               7               8               9               10               11                 12               13               14               15               16               17               18                 19               20               21               22               23               24               25                 26               27               28               29               30               31                  Date Details   No additional details    Date of next INR:  3/1/2017         How to take your warfarin dose     To take:  2.5 mg Take 1 of the 2.5 mg tablets.

## 2017-03-01 NOTE — MR AVS SNAPSHOT
Etelvina Jacobs   3/1/2017   Anticoagulation Therapy Visit    Description:  66 year old female   Provider:  Keiko Shoemaker, RN   Department:  [facility]           INR as of 3/1/2017     Today's INR 2.83      Anticoagulation Summary as of 3/1/2017     INR goal 2.0-3.0   Today's INR 2.83   Full instructions 1.25 mg on Tue; 2.5 mg all other days   Next INR check 3/8/2017    Indications   Long-term (current) use of anticoagulants [Z79.01] [Z79.01]  Deep vein thrombosis (DVT) (H) [I82.409] [I82.409]         March 2017 Details    Sun Mon Tue Wed Thu Fri Sat        1      2.5 mg   See details      2      2.5 mg         3      2.5 mg         4      2.5 mg           5      2.5 mg         6      2.5 mg         7      1.25 mg         8            9               10               11                 12               13               14               15               16               17               18                 19               20               21               22               23               24               25                 26               27               28               29               30               31                 Date Details   03/01 This INR check       Date of next INR:  3/8/2017         How to take your warfarin dose     To take:  1.25 mg Take 0.5 of a 2.5 mg tablet.    To take:  2.5 mg Take 1 of the 2.5 mg tablets.

## 2017-03-01 NOTE — PATIENT INSTRUCTIONS
Contact Numbers    [facility] [place] [phone]  [facility] [place]:  [phone]    Call triage with chills and/or temperature greater than or equal to 100.5, uncontrolled nausea/vomiting, diarrhea, constipation, dizziness, shortness of breath, chest pain, bleeding, unexplained bruising, or any new/concerning symptoms, questions/concerns.     If you are having any concerning symptoms or wish to speak to a provider before your next infusion visit, please call your care coordinator or triage to notify them so we can adequately serve you.       After Hours: [phone]    If after hours, weekends, or holidays, call main hospital  and ask for Oncology doctor on call.         March 2017 Sunday Monday Tuesday Wednesday Thursday Friday Saturday                  1     [facility] D[facility]   10:00 AM   (15 min.)    [facility] DRAW   [facility] Draw     [facility] ONC INFUSION 180   10:30 AM   (180 min.)    ONCOLOGY INFUSION   [facility] 2     3     4       5     6     7     [facility] RETURN    1:15 PM   (30 min.)   Darlene Peck MD   [facility] 8     [facility]    9:30 AM   (15 min.)    [facility]     [facility] ONC INFUSION 120   10:00 AM   (120 min.)    ONCOLOGY INFUSION   [facility] 9     10     11       12     13     14     15     [facility] DRAW   10:00 AM   (15 min.)    [facility] DRAW   [facility] Draw     [facility] ONC INFUSION 180   10:30 AM   (180 min.)    ONCOLOGY INFUSION   [facility] 16     17     18       19     20     21     22     [facility] DRAW   10:00 AM   (15 min.)    [facility] DRAW   [facility] Draw     [facility] ONC INFUSION 120   10:30 AM   (120 min.)    ONCOLOGY INFUSION   [facility] 23     24     25       26     27     28     29     [facility] D[facility]    7:30 AM   (15 min.)    [facility]  Draw     [facility] RETURN ACTIVE TREATMENT    8:05 AM   (40 min.)   Valentina Haney APRN CNP   [facility]     [facility] ONC INFUSION 180    9:00 AM   (180 min.)   [facility]FUSION   [facility] 30 31 April 2017 Sunday Monday Tuesday Wednesday Thursday Friday Saturday                                 1       2     3     [facility] DRAW    9:30 AM   (15 min.)    [facility]     CT CHEST/ABDOMEN/PELVIS W    9:45 AM   (20 min.)   [facility]CT2   [facility] CT     [facility] RETURN ACTIVE TREATMENT    1:25 PM   (20 min.)   Erica Markham MD   [facility] 4     5     [facility] DRAW    9:00 AM   (15 min.)    [facility]     [facility] RETURN ACTIVE TREATMENT    9:25 AM   (40 min.)   Valentina Haney APRN CNP   [facility]     [facility] ONC INFUSION 180   10:30 AM   (180 min.)   [facility] ONCOLOGY INFUSION   [facility] 6     7     8       9     10     11     12     [facility] DRAW    8:30 AM   (15 min.)    [facility]   [facility]     [facility] ONC INFUSION 120    9:00 AM   (120 min.)    ONCOLOGY INFUSION   [facility] 13     14     15       16     17     18     19     [facility] DRAW    8:30 AM   (15 min.)    [facility]RAW   [facility]     [facility] ON[facility] INFUSION 180    9:00 AM   (180 min.)    ONCOLOGY INFUSION   [facility] 20     21     22       23     24     25     26     [facility] DRAW    8:30 AM   (15 min.)    [facility]RAW   [facility]     [facility] ONC INFUSION 120    9:00 AM   (120 min.)    ONCOLOGY INFUSION   [facility] 27     28     29       30                                               Recent Results (from the past 24 hour(s))   CBC with platelets differential    Collection Time: 03/01/17 10:19 AM   Result  Value Ref Range    WBC 7.6 4.0 - 11.0 10e9/L    RBC Count 3.38 (L) 3.8 - 5.2 10e12/L    Hemoglobin 8.6 (L) 11.7 - 15.7 g/dL    Hematocrit 28.9 (L) 35.0 - 47.0 %    MCV 86 78 - 100 fl    MCH 25.4 (L) 26.5 - 33.0 pg    MCHC 29.8 (L) 31.5 - 36.5 g/dL    RDW 23.7 (H) 10.0 - 15.0 %    Platelet Count 310 150 - 450 10e9/L    Diff Method Automated Method     % Neutrophils 85.7 %    % Lymphocytes 4.5 %    % Monocytes 8.3 %    % Eosinophils 0.3 %    % Basophils 0.1 %    % Immature Granulocytes 1.1 %    Nucleated RBCs 0 0 /100    Absolute Neutrophil 6.5 1.6 - 8.3 10e9/L    Absolute Lymphocytes 0.3 (L) 0.8 - 5.3 10e9/L    Absolute Monocytes 0.6 0.0 - 1.3 10e9/L    Absolute Eosinophils 0.0 0.0 - 0.7 10e9/L    Absolute Basophils 0.0 0.0 - 0.2 10e9/L    Abs Immature Granulocytes 0.1 0 - 0.4 10e9/L    Absolute Nucleated RBC 0.0    Magnesium    Collection Time: 03/01/17 10:19 AM   Result Value Ref Range    Magnesium 1.8 1.6 - 2.3 mg/dL   Potassium    Collection Time: 03/01/17 10:19 AM   Result Value Ref Range    Potassium 3.3 (L) 3.4 - 5.3 mmol/L   INR    Collection Time: 03/01/17 10:19 AM   Result Value Ref Range    INR 2.83 (H) 0.86 - 1.14   Protein qualitative urine    Collection Time: 03/01/17 11:00 AM   Result Value Ref Range    Protein Albumin Urine Negative NEG mg/dL

## 2017-03-01 NOTE — PROGRESS NOTES
ANTICOAGULATION FOLLOW-UP CLINIC VISIT    Patient Name:  Etelvina Jacobs  Date:  3/1/2017  Contact Type:  Telephone    SUBJECTIVE:        OBJECTIVE    INR   Date Value Ref Range Status   03/01/2017 2.83 (H) 0.86 - 1.14 Final       ASSESSMENT / PLAN  INR assessment THER    Recheck INR In: 1 WEEK    INR Location Clinic      Anticoagulation Summary as of 3/1/2017     INR goal 2.0-3.0   Today's INR 2.83   Maintenance plan 1.25 mg (2.5 mg x 0.5) on Tue; 2.5 mg (2.5 mg x 1) all other days   Full instructions 1.25 mg on Tue; 2.5 mg all other days   Weekly total 16.25 mg   No change documented Keiko Shoemaker RN   Plan last modified Leana Castaneda RN (2/1/2017)   Next INR check 3/8/2017   Priority INR   Target end date     Indications   Long-term (current) use of anticoagulants [Z79.01] [Z79.01]  Deep vein thrombosis (DVT) (H) [I82.409] [I82.409]         Anticoagulation Episode Summary     INR check location     Preferred lab     Send INR reminders to [facility]    Comments Has 5mg and 2.5mg tablets       Anticoagulation Care Providers     Provider Role Specialty Phone number    Alberta Deal MD [facility] Internal Medicine [phone]            See the Encounter Report to view Anticoagulation Flowsheet and Dosing Calendar (Go to Encounters tab in chart review, and find the Anticoagulation Therapy Visit)    Left message with results and dosing recommendations. Asked patient to call back to report any missed doses, falls, signs and symptoms of bleeding or clotting, or any changes to health or diet.     Keiko Shoemaker, ODESSA

## 2017-03-01 NOTE — MR AVS SNAPSHOT
After Visit Summary   3/1/2017    Etelvina Jacobs    MRN: [id number]           Patient Information     Date Of Birth          1950        Visit Information        Provider Department      3/1/2017 10:30 AM  22 ATC;  ONCOLOGY INFUSION [facility]        Today's Diagnoses     Ovarian cancer, left (H)    -  1    Encounter for long-term current use of medication        Deep vein thrombosis (DVT) (H)          Care Instructions    Contact Numbers    [facility] [place]: [phone]  [facility] [place]:  [phone]    Call triage with chills and/or temperature greater than or equal to 100.5, uncontrolled nausea/vomiting, diarrhea, constipation, dizziness, shortness of breath, chest pain, bleeding, unexplained bruising, or any new/concerning symptoms, questions/concerns.     If you are having any concerning symptoms or wish to speak to a provider before your next infusion visit, please call your care coordinator or triage to notify them so we can adequately serve you.       After Hours: [phone]    If after hours, weekends, or holidays, call main hospital  and ask for Oncology doctor on call.         March 2017 Sunday Monday Tuesday Wednesday Thursday Friday Saturday                  1     [facility]   10:00 AM   (15 min.)    [facility] DRAW   [facility] Lab Draw     [facility] ONC INFUSION 180   10:30 AM   (180 min.)    ONCOLOGY INFUSION   [facility] 2     3     4       5     6     7     [facility] RETURN    1:15 PM   (30 min.)   Darlene Peck MD   [facility] 8     [facility] DRAW    9:30 AM   (15 min.)    [facility]   [facility]     [facility] ONC INFUSION 120   10:00 AM   (120 min.)    ONCOLOGY INFUSION   [facility] 9     10     11       12     13     14     15     [facility] DRAW   10:00 AM   (15 min.)    [facility] LAB DRAW   [facility] Draw     [facility] ONC INFUSION  180   10:30 AM   (180 min.)   [facility] ONCOLOGY INFUSION   [facility] 16     17     18       19     20     21     22     [facility] DRAW   10:00 AM   (15 min.)   [facility] DRAW   [facility] Draw     [facility] ONC INFUSION 120   10:30 AM   (120 min.)   [facility] ONCOLOGY INFUSION   [facility] 23     24     25       26     27     28     29     [facility] DRAW    7:30 AM   (15 min.)   [facility] DRAW   [facility] Lab Draw     [facility] RETURN ACTIVE TREATMENT    8:05 AM   (40 min.)   Valentina Haney APRN CNP   [facility]     [facility] ONC INFUSION 180    9:00 AM   (180 min.)   [facility] ONCOLOGY INFUSION   [facility] 30 31 April 2017 Sunday Monday Tuesday Wednesday Thursday Friday Saturday                                 1       2     3     [facility] DRAW    9:30 AM   (15 min.)   [facility] DRAW   [facility] Lab Draw     CT CHEST/ABDOMEN/PELVIS W    9:45 AM   (20 min.)   UCCT2   [facility] CT     [facility] RETURN ACTIVE TREATMENT    1:25 PM   (20 min.)   Erica Markham MD   [facility] 4     5     [facility] DRAW    9:00 AM   (15 min.)   [facility] DRAW   [facility] Lab Draw     [facility] RETURN ACTIVE TREATMENT    9:25 AM   (40 min.)   Valentina Haney APRN CNP   [facility]     [facility] ONC INFUSION 180   10:30 AM   (180 min.)   UC ONCOLOGY INFUSION   [facility] 6     7     8       9     10     11     12     [facility] DRAW    8:30 AM   (15 min.)   [facility]   [facility]     [facility] ONC INFUSION 120    9:00 AM   (120 min.)   [facility] ONCOLOGY INFUSION   [facility] 13     14     15       16     17     18     19     [facility] DRAW    8:30 AM   (15 min.)   [facility] DRAW   [facility] Draw     [facility] ONC INFUSION 180    9:00 AM   (180 min.)   UC ONCOLOGY INFUSION     [facility] 20     21     22       23     24     25     26     [facility] LAB DRAW    8:30 AM   (15 min.)   [facility] LAB DRAW   [facility] Lab Draw     [facility] ONC INFUSION 120    9:00 AM   (120 min.)    ONCOLOGY INFUSION   [facility] 27     28     29       30                                               Recent Results (from the past 24 hour(s))   CBC with platelets differential    Collection Time: 03/01/17 10:19 AM   Result Value Ref Range    WBC 7.6 4.0 - 11.0 10e9/L    RBC Count 3.38 (L) 3.8 - 5.2 10e12/L    Hemoglobin 8.6 (L) 11.7 - 15.7 g/dL    Hematocrit 28.9 (L) 35.0 - 47.0 %    MCV 86 78 - 100 fl    MCH 25.4 (L) 26.5 - 33.0 pg    MCHC 29.8 (L) 31.5 - 36.5 g/dL    RDW 23.7 (H) 10.0 - 15.0 %    Platelet Count 310 150 - 450 10e9/L    Diff Method Automated Method     % Neutrophils 85.7 %    % Lymphocytes 4.5 %    % Monocytes 8.3 %    % Eosinophils 0.3 %    % Basophils 0.1 %    % Immature Granulocytes 1.1 %    Nucleated RBCs 0 0 /100    Absolute Neutrophil 6.5 1.6 - 8.3 10e9/L    Absolute Lymphocytes 0.3 (L) 0.8 - 5.3 10e9/L    Absolute Monocytes 0.6 0.0 - 1.3 10e9/L    Absolute Eosinophils 0.0 0.0 - 0.7 10e9/L    Absolute Basophils 0.0 0.0 - 0.2 10e9/L    Abs Immature Granulocytes 0.1 0 - 0.4 10e9/L    Absolute Nucleated RBC 0.0    Magnesium    Collection Time: 03/01/17 10:19 AM   Result Value Ref Range    Magnesium 1.8 1.6 - 2.3 mg/dL   Potassium    Collection Time: 03/01/17 10:19 AM   Result Value Ref Range    Potassium 3.3 (L) 3.4 - 5.3 mmol/L   INR    Collection Time: 03/01/17 10:19 AM   Result Value Ref Range    INR 2.83 (H) 0.86 - 1.14   Protein qualitative urine    Collection Time: 03/01/17 11:00 AM   Result Value Ref Range    Protein Albumin Urine Negative NEG mg/dL               Follow-ups after your visit        Your next 10 appointments already scheduled     Mar 07, 2017  1:30 PM CST   (Arrive by 1:15 PM)   Return Visit with Darlene Peck MD   [facility] ([facility])    [address] [address]   [phone]            Mar 08, 2017  9:30 AM CST   [facility] Lab Draw with [facility] DRAW   [facility] Lab Draw ([facility])    [address] [place] [address]   [phone]            Mar 08, 2017 10:00 AM CST   Infusion 120 with UC ONCOLOGY INFUSION, UC 28 ATC   [facility] ([facility])    [address] [place] [address]   [phone]            Mar 15, 2017 10:00 AM CDT   [facility] Lab Draw with [facility] DRAW   [facility] Draw ([facility])    [address] [place] [address]   [phone]            Mar 15, 2017 10:30 AM CDT   Infusion 180 with UC ONCOLOGY INFUSION, UC 22 ATC   [facility] ([facility])    [address] [place] [address]   [phone]            Mar 22, 2017 10:00 AM CDT   [facility] Lab Draw with [facility] DRAW   [facility] Draw ([facility])    [address] [place] [address]   [phone]            Mar 22, 2017 10:30 AM CDT   Infusion 120 with UC ONCOLOGY INFUSION, UC 30 ATC   [facility] ([facility])    [address] [place] [address]   [phone]              Who to contact     If you have questions or need follow up information about today's clinic visit or your schedule please contact [facility] directly at [phone].  Normal or non-critical lab and imaging results will be communicated to you by MyChart, letter or phone within 4 business days after the clinic has received the results. If you do not hear from us within 7 days, please  contact the clinic through [employer] or phone. If you have a critical or abnormal lab result, we will notify you by phone as soon as possible.  Submit refill requests through [employer] or call your pharmacy and they will forward the refill request to us. Please allow 3 business days for your refill to be completed.          Additional Information About Your Visit        [employer] Information     [employer] gives you secure access to your electronic health record. If you see a primary care provider, you can also send messages to your care team and make appointments. If you have questions, please call your primary care clinic.  If you do not have a primary care provider, please call [phone] and they will assist you.        Care EveryWhere ID     This is your Care EveryWhere ID. This could be used by other organizations to access your [place] medical records  [id number]        Your Vitals Were     Pulse Temperature Respirations Pulse Oximetry BMI (Body Mass Index)       98 98.7  F (37.1  C) 20 93% 30.81 kg/m2        Blood Pressure from Last 3 Encounters:   03/01/17 144/81   02/25/17 137/78   02/22/17 128/69    Weight from Last 3 Encounters:   03/01/17 76.4 kg (168 lb 6.9 oz)   02/25/17 75 kg (165 lb 4 oz)   02/22/17 77.2 kg (170 lb 4.8 oz)              We Performed the Following     CBC with platelets differential     INR     Magnesium     Potassium     Protein qualitative urine     Protein qualitative urine     Protein qualitative urine     Treatment Conditions        Primary Care Provider Office Phone # Fax #    [facility] [phone]       [address] [address] [facility] [address]        Thank you!     Thank you for choosing [facility]  for your care. Our goal is always to provide you with excellent care. Hearing back from our patients is one way we can continue to improve our services. Please take a few minutes to complete the written survey that you may receive in the  mail after your visit with us. Thank you!             Your Updated Medication List - Protect others around you: Learn how to safely use, store and throw away your medicines at www.disposemymeds.org.          This list is accurate as of: 3/1/17  2:43 PM.  Always use your most recent med list.                   Brand Name Dispense Instructions for use    * albuterol 108 (90 BASE) MCG/ACT Inhaler    PROAIR HFA/PROVENTIL HFA/VENTOLIN HFA     Inhale 2 puffs into the lungs       * albuterol 108 (90 BASE) MCG/ACT Inhaler    albuterol    1 Inhaler    Inhale 2 puffs into the lungs every 6 hours       * albuterol 108 (90 BASE) MCG/ACT Inhaler    PROAIR HFA/PROVENTIL HFA/VENTOLIN HFA    1 Inhaler    Inhale 2 puffs into the lungs every 4 hours as needed for shortness of breath / dyspnea or wheezing Use with spacer       * albuterol (2.5 MG/3ML) 0.083% neb solution     1 Box    Take 1 vial (2.5 mg) by nebulization every 6 hours as needed for shortness of breath / dyspnea or wheezing       BENADRYL PO      Take 50 mg by mouth daily as needed       cefdinir 300 MG capsule    OMNICEF    10 capsule    Take 1 capsule (300 mg) by mouth daily for 10 days       dexamethasone 2 MG tablet    DECADRON    30 tablet    Take 1 tablet (2 mg) by mouth every morning       DUONEB 0.5-2.5 (3) MG/3ML neb solution   Generic drug:  ipratropium - albuterol 0.5 mg/2.5 mg/3 mL     1 vial    Take 1 vial (3 mLs) by nebulization daily       HYDROmorphone 2 MG tablet    DILAUDID    200 tablet    Take 1-2 tablets (2-4 mg) by mouth every 3 hours as needed (dyspnea and??/ or cough)       * LORazepam 0.5 MG tablet    ATIVAN    30 tablet    Take 1 tablet (0.5 mg) by mouth every 6 hours as needed (Anxiety, Nausea/Vomiting or Sleep)       * LORazepam 1 MG tablet    ATIVAN    90 tablet    Take 1 tablet (1 mg) by mouth every 6 hours as needed (Anxiety, Nausea/Vomiting or Sleep)       omeprazole 20 MG CR capsule    priLOSEC    90 capsule    Take 2 capsules (40 mg) by  mouth daily       ondansetron 8 MG tablet    ZOFRAN    30 tablet    Take 1 tablet (8 mg) by mouth every 8 hours as needed for nausea       * order for DME     1 Device    Equipment being ordered: Oxygen 1-2 L NC Keep O2 sats > 90%. RA sats 88%       * order for DME     1 Device    Equipment being ordered: Oxygen 3 liters of oxygen continuous to keep sats >90%. RA sats 88%.       * order for DME     1 Units    Equipment being ordered: Nebulizer       oxyCODONE 10 MG 12 hr tablet    OXYCONTIN    90 tablet    Take one tablet first thing in the morning, middle of the day and right before bed.       predniSONE 20 MG tablet    DELTASONE    18 tablet    20mg/daily: 3 tablets the first 3 days, then 2 tabs the next 3 days, then 1 tab daily for the last 3 days       * prochlorperazine 10 MG tablet    COMPAZINE    30 tablet    Take 1 tablet (10 mg) by mouth every 6 hours as needed (nausea/vomiting)       * prochlorperazine 5 MG tablet    COMPAZINE    30 tablet    Take 1 tablet (5 mg) by mouth every 6 hours as needed (Nausea/Vomiting)       senna 8.6 MG tablet    SENOKOT    120 tablet    Take 1-2 tablets by mouth 2 times daily as needed for constipation       sertraline 50 MG tablet    ZOLOFT    30 tablet    Take 1 tablet (50 mg) by mouth daily       sodium chloride 0.65 % nasal spray    OCEAN    88 mL    Spray 1 spray into both nostrils every 2 hours as needed for congestion       spacer/aero-hold chamber mask Alexandra     1 each    1 Adult size spacer to use with MDI inhalers.       umeclidinium-vilanterol 62.5-25 MCG/INH oral inhaler    ANORO ELLIPTA    1 Inhaler    Inhale 1 puff into the lungs daily as needed Alternate with duoneb q6h Therapeutic interchange for Combivent Inhaler.       * warfarin 5 MG tablet    COUMADIN    72 tablet    Take 2.5 mg on Tues, Thurs, Sun and 5 mg Mon, Wed, Fri, Sat or as directed by anticoagulation clinic.       * warfarin 2.5 MG tablet    COUMADIN    60 tablet    Take one to two tablets daily or  as directed by the Coumadin clinic       * Notice:  This list has 13 medication(s) that are the same as other medications prescribed for you. Read the directions carefully, and ask your doctor or other care provider to review them with you.

## 2017-03-01 NOTE — PROGRESS NOTES
Infusion Nursing Note:  Etelvina Jacobs presents today for C3D15 Avastin, taxol.    Patient seen by provider today: No   present during visit today: Not Applicable.    Note: pt has no new c/o today. She reports she is eating better and is not having any nausea this week, she continues to c/o feeling full easily and she has pain in her left toe that she reports is not new. Potassium replace po today per TORB/VORB: Valentina Haney NP/Etelvina Newton RN to replace K po today per protocol.    Intravenous Access:  Implanted Port.    Treatment Conditions:  Lab Results   Component Value Date    HGB 8.6 03/01/2017     Lab Results   Component Value Date    WBC 7.6 03/01/2017      Lab Results   Component Value Date    ANEU 6.5 03/01/2017     Lab Results   Component Value Date     03/01/2017      Lab Results   Component Value Date     02/15/2017                   Lab Results   Component Value Date    POTASSIUM 3.3 03/01/2017           Lab Results   Component Value Date    MAG 1.8 03/01/2017            Lab Results   Component Value Date    CR 1.34 02/15/2017                   Lab Results   Component Value Date    LUDWIG 8.8 02/15/2017                Lab Results   Component Value Date    BILITOTAL 0.4 02/15/2017           Lab Results   Component Value Date    ALBUMIN 2.9 02/15/2017                    Lab Results   Component Value Date    ALT 12 02/15/2017           Lab Results   Component Value Date    AST 18 02/15/2017     Urine negative for protein.     Results reviewed, labs MET treatment parameters, ok to proceed with treatment.      Post Infusion Assessment:  Patient tolerated infusion without incident.  Blood return noted pre and post infusion.  Site patent and intact, free from redness, edema or discomfort.  No evidence of extravasations.  Access discontinued per protocol.    Discharge Plan:   Patient declined prescription refills.  Discharge instructions reviewed with: Patient.  Patient and/or family  verbalized understanding of discharge instructions and all questions answered.  Copy of AVS reviewed with patient and/or family.  Patient will return 3/8/17 for next appointment.  Patient discharged in stable condition accompanied by: self and .  Departure Mode: Wheelchair.    Etelvina Newton RN

## 2017-03-01 NOTE — NURSING NOTE
Chief Complaint   Patient presents with     Port Draw     Labs drawn by RN from port. VS taken.      INGRID KAPLAN RN

## 2017-03-01 NOTE — PROGRESS NOTES
Labs reviewed, okay for chemotherapy. Oral potassium replacement in infusino. Valentina Haney FNP-C

## 2017-03-07 NOTE — MR AVS SNAPSHOT
After Visit Summary   3/7/2017    Etelvina Jacobs    MRN: [id number]           Patient Information     Date Of Birth          1950        Visit Information        Provider Department      3/7/2017 1:30 PM Darlene Peck MD [facility]        Today's Diagnoses     Shortness of breath        Malignant neoplasm metastatic to both lungs (H)          Care Instructions    Cough:   - increase Oxycontin 10mg TID  - continue hydromorphone 2-4 mg every 3 hours as needed for cough and shortness of breath.      Anxiety / depression   - continue zoloft 50mg daily  - lorazepam 1 tablet as needed twice a day anxiety     Constipation:   - senna 1 tablet twice a day    Meet with palliative care , Vicki Chris.    Consider hospice consult for more information and we will try to figure out if you can meet with hospice outside of your home.      Follow up:    4-8 weeks    Reasons to Call    If you are having worsening/uncontrolled symptoms we want you to call!    You or your other physicians make any changes to medications we have prescribed.      Important Phone Numbers    Celi Lamas. Brenda SAXENA. Answered 8 am to 4 pm Tuesday - Friday.    Appointment Line.[phone]     After hours. Will connect you with on call MD. [phone]      Darlene Peck MD  Palliative Care Physician  Clinic Number [phone]                    Follow-ups after your visit        Your next 10 appointments already scheduled     Mar 08, 2017  9:30 AM CST   [facility] Lab Draw with [facility] LAB DRAW   [facility] ([facility])    [address] [place] [id number] [phone]            Mar 08, 2017 10:00 AM CST   Infusion 120 with UC ONCOLOGY INFUSION, UC 28 ATC   [facility] ([facility])    [address] [id number] [phone]            Mar  15, 2017 10:00 AM CDT   [facility] Lab Draw with [facility] LAB DRAW   [facility] Lab Draw ([facility])    [address] [address]   [phone]            Mar 15, 2017 10:30 AM CDT   Infusion 180 with UC ONCOLOGY INFUSION, UC 22 ATC   [facility] ([facility])    [address] [address] [phone]            Mar 22, 2017 10:00 AM CDT   [facility] Lab Draw with [facility] LAB DRAW   [facility] Lab Draw ([facility])    [address] [address] [phone]            Mar 22, 2017 10:30 AM CDT   Infusion 120 with UC ONCOLOGY INFUSION, UC 30 ATC   [facility] ([facility])    [address] [address] [phone]            Mar 29, 2017  7:30 AM CDT   [facility] Lab Draw with UC MASONIC LAB DRAW   [facility] Lab Draw ([facility])    [address] [address] [phone]              Who to contact     If you have questions or need follow up information about today's clinic visit or your schedule please contact [facility] directly at [phone].  Normal or non-critical lab and imaging results will be communicated to you by MyChart, letter or phone within 4 business days after the clinic has received the results. If you do not hear from us within 7 days, please contact the clinic through MyChart or phone. If you have a critical or abnormal lab result, we will notify you by phone as soon as possible.  Submit refill requests through [facility] or call your pharmacy and they will forward the refill request to us. Please allow 3 business days for your refill to be completed.          Additional Information About Your Visit        MyChart Information      [facility] gives you secure access to your electronic health record. If you see a primary care provider, you can also send messages to your care team and make appointments. If you have questions, please call your primary care clinic.  If you do not have a primary care provider, please call [phone] and they will assist you.        Care EveryWhere ID     This is your Care EveryWhere ID. This could be used by other organizations to access your [facility] medical records  [id number]        Your Vitals Were     Pulse Temperature Respirations Pulse Oximetry BMI (Body Mass Index)       119 100.3  F (37.9  C) (Oral) 18 93% 30.6 kg/m2        Blood Pressure from Last 3 Encounters:   03/07/17 115/62   03/01/17 144/81   02/25/17 137/78    Weight from Last 3 Encounters:   03/07/17 75.9 kg (167 lb 4.8 oz)   03/01/17 76.4 kg (168 lb 6.9 oz)   02/25/17 75 kg (165 lb 4 oz)              Today, you had the following     No orders found for display         Today's Medication Changes          These changes are accurate as of: 3/7/17  1:53 PM.  If you have any questions, ask your nurse or doctor.               These medicines have changed or have updated prescriptions.        Dose/Directions    * albuterol 108 (90 BASE) MCG/ACT Inhaler   Commonly known as:  albuterol   This may have changed:  Another medication with the same name was removed. Continue taking this medication, and follow the directions you see here.   Used for:  Shortness of breath, Malignant neoplasm metastatic to both lungs (H), Ovarian cancer, unspecified laterality (H)   Changed by:  Darlene Peck MD        Dose:  2 puff   Inhale 2 puffs into the lungs every 6 hours   Quantity:  1 Inhaler   Refills:  11       * albuterol (2.5 MG/3ML) 0.083% neb solution   This may have changed:  Another medication with the same name was removed. Continue taking this medication, and follow the directions you see here.   Used for:  Wheezing   Changed by:  Mary Llanos  MD Madeline        Dose:  1 vial   Take 1 vial (2.5 mg) by nebulization every 6 hours as needed for shortness of breath / dyspnea or wheezing   Quantity:  1 Box   Refills:  3       LORazepam 1 MG tablet   Commonly known as:  ATIVAN   This may have changed:  Another medication with the same name was removed. Continue taking this medication, and follow the directions you see here.   Used for:  Ovarian cancer, unspecified laterality (H)   Changed by:  Valentina Haney APRN CNP        Dose:  1 mg   Take 1 tablet (1 mg) by mouth every 6 hours as needed (Anxiety, Nausea/Vomiting or Sleep)   Quantity:  90 tablet   Refills:  1       prochlorperazine 5 MG tablet   Commonly known as:  COMPAZINE   This may have changed:  Another medication with the same name was removed. Continue taking this medication, and follow the directions you see here.   Used for:  Ovarian cancer, left (H), Encounter for long-term current use of medication        Dose:  5 mg   Take 1 tablet (5 mg) by mouth every 6 hours as needed (Nausea/Vomiting)   Quantity:  30 tablet   Refills:  2       * Notice:  This list has 2 medication(s) that are the same as other medications prescribed for you. Read the directions carefully, and ask your doctor or other care provider to review them with you.      Stop taking these medicines if you haven't already. Please contact your care team if you have questions.     cefdinir 300 MG capsule   Commonly known as:  OMNICEF   Stopped by:  Darlene Peck MD           DUONEB 0.5-2.5 (3) MG/3ML neb solution   Generic drug:  ipratropium - albuterol 0.5 mg/2.5 mg/3 mL   Stopped by:  Darlene Peck MD                Where to get your medicines      Some of these will need a paper prescription and others can be bought over the counter.  Ask your nurse if you have questions.     Bring a paper prescription for each of these medications     oxyCODONE 10 MG 12 hr tablet                Primary Care Provider Office Phone #  Fax #    [facility] [phone] [address] [facility] [address]        Thank you!     Thank you for choosing [facility]  for your care. Our goal is always to provide you with excellent care. Hearing back from our patients is one way we can continue to improve our services. Please take a few minutes to complete the written survey that you may receive in the mail after your visit with us. Thank you!             Your Updated Medication List - Protect others around you: Learn how to safely use, store and throw away your medicines at www.disposemymeds.org.          This list is accurate as of: 3/7/17  1:53 PM.  Always use your most recent med list.                   Brand Name Dispense Instructions for use    * albuterol 108 (90 BASE) MCG/ACT Inhaler    albuterol    1 Inhaler    Inhale 2 puffs into the lungs every 6 hours       * albuterol (2.5 MG/3ML) 0.083% neb solution     1 Box    Take 1 vial (2.5 mg) by nebulization every 6 hours as needed for shortness of breath / dyspnea or wheezing       BENADRYL PO      Take 50 mg by mouth daily as needed       dexamethasone 2 MG tablet    DECADRON    30 tablet    Take 1 tablet (2 mg) by mouth every morning       HYDROmorphone 2 MG tablet    DILAUDID    200 tablet    Take 1-2 tablets (2-4 mg) by mouth every 3 hours as needed (dyspnea and??/ or cough)       LORazepam 1 MG tablet    ATIVAN    90 tablet    Take 1 tablet (1 mg) by mouth every 6 hours as needed (Anxiety, Nausea/Vomiting or Sleep)       omeprazole 20 MG CR capsule    priLOSEC    90 capsule    Take 2 capsules (40 mg) by mouth daily       ondansetron 8 MG tablet    ZOFRAN    30 tablet    Take 1 tablet (8 mg) by mouth every 8 hours as needed for nausea       * order for DME     1 Device    Equipment being ordered: Oxygen 1-2 L NC Keep O2 sats > 90%. RA sats 88%       * order for DME     1 Device    Equipment being ordered: Oxygen 3 liters of oxygen continuous  to keep sats >90%. RA sats 88%.       * order for DME     1 Units    Equipment being ordered: Nebulizer       oxyCODONE 10 MG 12 hr tablet    OXYCONTIN    90 tablet    Take one tablet first thing in the morning, middle of the day and right before bed.       predniSONE 20 MG tablet    DELTASONE    18 tablet    20mg/daily: 3 tablets the first 3 days, then 2 tabs the next 3 days, then 1 tab daily for the last 3 days       prochlorperazine 5 MG tablet    COMPAZINE    30 tablet    Take 1 tablet (5 mg) by mouth every 6 hours as needed (Nausea/Vomiting)       senna 8.6 MG tablet    SENOKOT    120 tablet    Take 1-2 tablets by mouth 2 times daily as needed for constipation       sertraline 50 MG tablet    ZOLOFT    30 tablet    Take 1 tablet (50 mg) by mouth daily       sodium chloride 0.65 % nasal spray    OCEAN    88 mL    Spray 1 spray into both nostrils every 2 hours as needed for congestion       spacer/aero-hold chamber mask Alexandra     1 each    1 Adult size spacer to use with [facility]I inhalers.       umeclidinium-vilanterol 62.5-25 MCG/INH oral inhaler    ANORO ELLIPTA    1 Inhaler    Inhale 1 puff into the lungs daily as needed Alternate with duoneb q6h Therapeutic interchange for Combivent Inhaler.       VIOS AEROSOL DELIVERY SYSTEM [facility]      UTD       * warfarin 5 MG tablet    COUMADIN    72 tablet    Take 2.5 mg on Tues, Thurs, Sun and 5 mg Mon, Wed, Fri, Sat or as directed by anticoagulation clinic.       * warfarin 2.5 MG tablet    COUMADIN    60 tablet    Take one to two tablets daily or as directed by the Coumadin clinic       * Notice:  This list has 7 medication(s) that are the same as other medications prescribed for you. Read the directions carefully, and ask your doctor or other care provider to review them with you.

## 2017-03-07 NOTE — PROGRESS NOTES
"Palliative Care Clinical Social Work Return Appointment    PLEASE NOTE:  THIS IS A MENTAL HEALTH NOTE.  OTHER PROVIDERS VIEWING THIS NOTE SHOULD USE THIS ONLY FOR UNDERSTANDING THE CONTEXT OF THE PATIENT S EXPERIENCE.  TOPICS DESCRIBED IN THIS NOTE SHOULD NOT BE REFERENCED TO THE PATIENT BY MEDICAL PROVIDERS.    Etelvina is a 66 year old woman with metastatic ovarian cancer, seen today for a return appointment.  present and supportive.    Mental Status Exam:(List all that apply)      Appearance: Appropriate      Eye Contact: Good       Orientation: Yes, x4      Mood: Anxious      Affect: Anxious      Thought Content: Clear         Thought Form: Logical      Psychomotor Behavior: Normal    Visit summary:   Mental Health (Anxiety, depression, other symptoms):   Etelvina voices significant anxiety both about the future and about current medical decisions. She understands that her oncologist recommends hospice care at this point. However, as long as her team is willing and she is physically able, Etelvina talks about wanting to continue chemotherapy, largely because of fear of dying and fear of having given up rather than \"fighting to the end\". Her anxiety is overwhelming at times and she expresses distress with not being able to know at this point whether chemo is causing her more harm, shortening her life through side effects, or whether it is extending her life.     Etelvina tells me that she continues to receive relief and comfort from spending time with their little dog, Carlin. Her  remains a support. She loves being outdoors, in the woods and by lakes, and going to the [facility]. She shares photos of travel up [place] last summer. Recently, although they have gone to [place] they enjoy, she has not been getting out of the vehicle due to fatigue and shortness of breath.    Adjustment to Illness: Etelvina tells me that she feels like she has had a bad cold she cannot kick since late last summer. She does understand " "that her oncologist thinks it may be the cancer that is causing her symptoms of shortness of breath and coughing. She continues to feel unsure, for herself. She continues to hope that perhaps if she increases exercise it will help her to get better, as it has in the past. She also continues to hope that even though her oncologist does not expect this, she could possibly get better from the chemo. She remembers times in the past when she was able to get better after going through chemo.    During our discussion she tells me she has decided to continue with the chemo plan for now. However, she is also receptive to meeting a hospice  to learn more about it. A big barrier for her is feeling embarrassed about their house being cluttered and not as clean as she used to keep it when feeling well. We discuss the idea of seeing if hospice may be able to do an initial meeting with her in a community setting instead of at her house. She agrees she would be open to this and she and her  come up with [facility] in [place] as a place they feel comfortable and could do a hospice initial meeting.    Relationships: She is appreciative of support from her  and sister. Her sister recently gave her a walker which she is using today. She finds it very helpful and plans to try using it to take short walks outdoors. She has found regular walks very helpful for mood and for physical well being in the past and hopes they will help now.    End of Life and Advance Care Planning: Much fear about dying expressed. She is trying to figure out what decisions and actions are likely to lead to longest possible life at this point. Her priority is quantity, not just quality, she tells me. She expresses fears about her 's coping after she dies - \"He is disabled, who will take care of him?\" She expresses that belief in God gives her a bit of comfort in terms of facing death herself.    Internal Resources: Good memories " of travel and being in nature, desire to continue enjoying aspects of her life she enjoys, like being with her dog and , getting outdoors.     Main therapeutic interventions provided this session include:  Facilitating processing of feelings and thoughts, facilitating life review; re-framing; motivational interviewing; psychoeducation    Plan:  I will discuss options for an initial hospice visit with [facility] per Etelvina's request, and will reach out to her to share options. We also agree that I will follow up during upcoming infusion visits to see Etelvina, since she has not felt up to scheduling recently, and has not been replying to phone messages.    Time spent with patient/family:  50 minutes    Palliative Care Counseling Treatment Plan    Client's Name: Etelvina Jacobs  YOB: 1950    Date: 11/23/2016    Initial DSM5 Diagnoses:   296.32 Major Depressive Disorder, Recurrent Episode, Moderate With mixed features  300.01 (F41.0) Panic Disorder  300.02 (F41.1) Generalized Anxiety Disorder      Referral / Collaboration:  .Referral to another professional/service is not indicated at this time.      Anticipated number of sessions to complete episode of care:  10      Treatment Goal(s)  Date Goal Dates  Reviewed Status    Goal 1:  Client will Experience reduction in anxiety.    Continued    Objective #1A (Client Action)  Client will Identify triggers for anxiety/panic attacks, Identify early signs/symptoms of anxiety and Practice self awareness exercises.    Intervention(s)  Therapist will Provide psychoeducation and Facilitate structured problem solving .    Continued    Objective #1B  Client will Identify effective coping strategies.    Intervention(s)  Therapist will Provide psychoeducation, Provide behavioral intervention training and Facilitate structured problem solving. Provide motivational interviewing.    Continued    Objective #1C: Client will complete advance health  care directive.    Interventions:  Therapist will facilitate advance care planning as well as supporting patient in facing anxiety related barriers to addressing this.             Date Goal Dates  Reviewed Status    Goal 2:  Client will effectively address grief / loss and depressed mood.    Continued    Objective #2A (Client Action)  Client will Increase understanding of loss and grief and Develop strategies for coping with grief/loss.    Intervention(s) (Therapist Action)  Therapist will Provide psychoeducation, Facilitate processing of thoughts and feelings and Facilitate structured problem solving.    Continued    Objective #2B  Client will Identify and use personally meaningful ritual(s) and Participate in Life Legacy work and Participate in life review.    Intervention(s)  Therapist will Provide psychoeducation, Facilitate Life Legacy work and Facilitate processing of thoughts and feelings.    Continued       Client reviewed and agreed to this plan on Dec 14, 2016.    PARAM Jiang, LICSW      DO NOT SEND ANY LETTERS

## 2017-03-07 NOTE — PATIENT INSTRUCTIONS
Cough:   - continue Oxycontin 10mg three times a day  - continue hydromorphone 2-4 mg every 3 hours as needed for cough and shortness of breath.      Anxiety / depression   - continue zoloft 50mg daily  - lorazepam 1 tablet as needed twice a day anxiety     Constipation:   - senna 1 tablet twice a day    Meet with palliative care , Vicki Chris.    Consider hospice consult for more information and we will try to figure out if you can meet with hospice outside of your home.      Follow up:    4-8 weeks    Reasons to Call    If you are having worsening/uncontrolled symptoms we want you to call!    You or your other physicians make any changes to medications we have prescribed.      Important Phone Numbers    Celi Lamas. Brenda SAXENA. Answered 8 am to 4 pm Tuesday - Friday.    Appointment Line.[phone]     After hours. Will connect you with on call MD. [phone]      Darlene Peck MD  Palliative Care Physician  Clinic Number [phone]

## 2017-03-07 NOTE — MR AVS SNAPSHOT
After Visit Summary   3/7/2017    Etelvina Jacobs    MRN: [id number]           Patient Information     Date Of Birth          1950        Visit Information        Provider Department      3/7/2017 2:33 PM Vicki Chris LICSW [facility]        Today's Diagnoses     Encounter for palliative care    -  1    Generalized anxiety disorder           Follow-ups after your visit        Your next 10 appointments already scheduled     Mar 15, 2017 10:00 AM CDT   [facility] Lab Draw with [facility] LAB DRAW   [facility] ([facility])    [address] [place] [address]   [phone]            Mar 15, 2017 10:30 AM CDT   Infusion 180 with UC ONCOLOGY INFUSION, UC 22 ATC   [facility] ([facility])    [address] [place] [address]   [phone]            Mar 22, 2017 10:00 AM CDT   [facility] Lab Draw with UC MASON[facility] LAB DRAW   [facility] ([facility])    [address] [place] [address]   [phone]            Mar 22, 2017 10:30 AM CDT   Infusion 120 with UC ONCOLOGY INFUSION, UC 30 ATC   [facility] ([facility])    [address] [place] [address]   [phone]            Mar 29, 2017  7:30 AM CDT   [facility] Lab Draw with [facility] LAB DRAW   [facility]    [address] [place] [address]   [phone]            Mar 29, 2017  8:20 AM CDT   (Arrive by 8:05 AM)   Return Active Treatment with LEANDRA Billy CNP   [facility] ([facility])    [address] [place] [address]   [phone]            Mar 29, 2017  9:00 AM CDT   Infusion 180 with UC ONCOLOGY  INFUSION, UC 24 ATC   [facility] ([facility])    [address]  [address]  [facility] [address] [phone]              Who to contact     If you have questions or need follow up information about today's clinic visit or your schedule please contact [facility] directly at [phone].  Normal or non-critical lab and imaging results will be communicated to you by MyChart, letter or phone within 4 business days after the clinic has received the results. If you do not hear from us within 7 days, please contact the clinic through [employer] or phone. If you have a critical or abnormal lab result, we will notify you by phone as soon as possible.  Submit refill requests through [employer] or call your pharmacy and they will forward the refill request to us. Please allow 3 business days for your refill to be completed.          Additional Information About Your Visit        [employer] Information     [employer] gives you secure access to your electronic health record. If you see a primary care provider, you can also send messages to your care team and make appointments. If you have questions, please call your primary care clinic.  If you do not have a primary care provider, please call [phone] and they will assist you.        Care EveryWhere ID     This is your Care EveryWhere ID. This could be used by other organizations to access your [place] medical records  [id number]         Blood Pressure from Last 3 Encounters:   03/08/17 130/73   03/07/17 115/62   03/01/17 144/81    Weight from Last 3 Encounters:   03/08/17 76.7 kg (169 lb 3.2 oz)   03/07/17 75.9 kg (167 lb 4.8 oz)   03/01/17 76.4 kg (168 lb 6.9 oz)              Today, you had the following     No orders found for display         Today's Medication Changes          These changes are accurate as of: 3/7/17 11:59 PM.  If you have any questions, ask your nurse or doctor.               These  medicines have changed or have updated prescriptions.        Dose/Directions    * albuterol 108 (90 BASE) MCG/ACT Inhaler   Commonly known as:  albuterol   This may have changed:  Another medication with the same name was removed. Continue taking this medication, and follow the directions you see here.   Used for:  Shortness of breath, Malignant neoplasm metastatic to both lungs (H), Ovarian cancer, unspecified laterality (H)   Changed by:  Darlene Peck MD        Dose:  2 puff   Inhale 2 puffs into the lungs every 6 hours   Quantity:  1 Inhaler   Refills:  11       * albuterol (2.5 MG/3ML) 0.083% neb solution   This may have changed:  Another medication with the same name was removed. Continue taking this medication, and follow the directions you see here.   Used for:  Wheezing   Changed by:  Mary Llanos MD        Dose:  1 vial   Take 1 vial (2.5 mg) by nebulization every 6 hours as needed for shortness of breath / dyspnea or wheezing   Quantity:  1 Box   Refills:  3       LORazepam 1 MG tablet   Commonly known as:  ATIVAN   This may have changed:  Another medication with the same name was removed. Continue taking this medication, and follow the directions you see here.   Used for:  Ovarian cancer, unspecified laterality (H)   Changed by:  Valentina Haney APRN CNP        Dose:  1 mg   Take 1 tablet (1 mg) by mouth every 6 hours as needed (Anxiety, Nausea/Vomiting or Sleep)   Quantity:  90 tablet   Refills:  1       prochlorperazine 5 MG tablet   Commonly known as:  COMPAZINE   This may have changed:  Another medication with the same name was removed. Continue taking this medication, and follow the directions you see here.   Used for:  Ovarian cancer, left (H), Encounter for long-term current use of medication        Dose:  5 mg   Take 1 tablet (5 mg) by mouth every 6 hours as needed (Nausea/Vomiting)   Quantity:  30 tablet   Refills:  2       * Notice:  This list has 2 medication(s) that are  the same as other medications prescribed for you. Read the directions carefully, and ask your doctor or other care provider to review them with you.      Stop taking these medicines if you haven't already. Please contact your care team if you have questions.     cefdinir 300 MG capsule   Commonly known as:  OMNICEF   Stopped by:  Darlene Peck MD           DUONEB 0.5-2.5 (3) MG/3ML neb solution   Generic drug:  ipratropium - albuterol 0.5 mg/2.5 mg/3 mL   Stopped by:  Darlene Peck MD                Where to get your medicines      Some of these will need a paper prescription and others can be bought over the counter.  Ask your nurse if you have questions.     Bring a paper prescription for each of these medications     oxyCODONE 10 MG 12 hr tablet                Primary Care Provider Office Phone # Fax #    [facility] [phone] [id number] [address] [facility] [facility] [address]        Thank you!     Thank you for choosing [facility]  for your care. Our goal is always to provide you with excellent care. Hearing back from our patients is one way we can continue to improve our services. Please take a few minutes to complete the written survey that you may receive in the mail after your visit with us. Thank you!             Your Updated Medication List - Protect others around you: Learn how to safely use, store and throw away your medicines at www.disposemymeds.org.          This list is accurate as of: 3/7/17 11:59 PM.  Always use your most recent med list.                   Brand Name Dispense Instructions for use    * albuterol 108 (90 BASE) MCG/ACT Inhaler    albuterol    1 Inhaler    Inhale 2 puffs into the lungs every 6 hours       * albuterol (2.5 MG/3ML) 0.083% neb solution     1 Box    Take 1 vial (2.5 mg) by nebulization every 6 hours as needed for shortness of breath / dyspnea or wheezing       BENADRYL PO      Take 50 mg by mouth daily as needed        dexamethasone 2 MG tablet    DECADRON    30 tablet    Take 1 tablet (2 mg) by mouth every morning       HYDROmorphone 2 MG tablet    DILAUDID    200 tablet    Take 1-2 tablets (2-4 mg) by mouth every 3 hours as needed (dyspnea and??/ or cough)       LORazepam 1 MG tablet    ATIVAN    90 tablet    Take 1 tablet (1 mg) by mouth every 6 hours as needed (Anxiety, Nausea/Vomiting or Sleep)       omeprazole 20 MG CR capsule    priLOSEC    90 capsule    Take 2 capsules (40 mg) by mouth daily       ondansetron 8 MG tablet    ZOFRAN    30 tablet    Take 1 tablet (8 mg) by mouth every 8 hours as needed for nausea       * order for DME     1 Device    Equipment being ordered: Oxygen 1-2 L NC Keep O2 sats > 90%. RA sats 88%       * order for DME     1 Device    Equipment being ordered: Oxygen 3 liters of oxygen continuous to keep sats >90%. RA sats 88%.       * order for DME     1 Units    Equipment being ordered: Nebulizer       oxyCODONE 10 MG 12 hr tablet    OXYCONTIN    90 tablet    Take one tablet first thing in the morning, middle of the day and right before bed.       predniSONE 20 MG tablet    DELTASONE    18 tablet    20mg/daily: 3 tablets the first 3 days, then 2 tabs the next 3 days, then 1 tab daily for the last 3 days       prochlorperazine 5 MG tablet    COMPAZINE    30 tablet    Take 1 tablet (5 mg) by mouth every 6 hours as needed (Nausea/Vomiting)       senna 8.6 MG tablet    SENOKOT    120 tablet    Take 1-2 tablets by mouth 2 times daily as needed for constipation       sertraline 50 MG tablet    ZOLOFT    30 tablet    Take 1 tablet (50 mg) by mouth daily       sodium chloride 0.65 % nasal spray    OCEAN    88 mL    Spray 1 spray into both nostrils every 2 hours as needed for congestion       spacer/aero-hold chamber mask Alexandra     1 each    1 Adult size spacer to use with MDI inhalers.       umeclidinium-vilanterol 62.5-25 MCG/INH oral inhaler    ANORO ELLIPTA    1 Inhaler    Inhale 1 puff into the lungs  daily as needed Alternate with duoneb q6h Therapeutic interchange for Combivent Inhaler.       VIOS AEROSOL DELIVERY SYSTEM [facility]      UTD       * warfarin 5 MG tablet    COUMADIN    72 tablet    Take 2.5 mg on Tues, Thurs, Sun and 5 mg Mon, Wed, Fri, Sat or as directed by anticoagulation clinic.       * warfarin 2.5 MG tablet    COUMADIN    60 tablet    Take one to two tablets daily or as directed by the Coumadin clinic       * Notice:  This list has 7 medication(s) that are the same as other medications prescribed for you. Read the directions carefully, and ask your doctor or other care provider to review them with you.

## 2017-03-07 NOTE — LETTER
"3/7/2017       RE: Etelvina Jacobs  [address]  [place] [address]     Dear Colleague,    Thank you for referring your patient, Etelvina Jacobs, to the [facility] at [facility]. Please see a copy of my visit note below.    Palliative Care Outpatient Clinic Progress Note    Patient Name:  Etelvina Jacobs  Primary Provider:  Clinic, [facility]    Chief Complaint: metastatic ovarian cancer with; shortness of breath    Interim History:  Etelvina Jacobs 66 year old female returns to be seen by palliative care today.  Etelvina states she is only feeling \"so so\".  Has had a cold for months now. Breathing not good, unchanged. Coughing spells that are not productive.     Crying a lot. Only has her  to talk to and often he can not hear her so she doesn't feel like she has anyone to talk to at times.      Debating if should continue treatment or not.  Not sure where she would stay as her house is very cluttered. She doesn't even want to have an informational interview with hospice because she is embarrassed by the clutter.     Treatment taking a toll on her body. Loss of balance now with treatment. Needs a walker. Needs help with everything now as well. Help walking. Can bath self but takes a while. Her  helps her get dressed as she loses.     Oxcontin 10mg TID and really not needing any of the hydromorphone.  Took 2 hydromorphone today with a coughing spasm but hasn't taken them for the past weeks.     Mood is terrible, feels \"bitchy\"  Frustrated as wants to do things and wants to feel normal and doesn't     Finished steroids this morning as was taking an abx and steroids for 10 days as was on it for a cough and cold. Prescribed by urgent care center with farhan Ash.     Review of Systems:  ROS: 10 point ROS neg other than the symptoms noted above in the HPI and here  No nausea, will vomit with coughing spell; no sig diarrhea, come " constipation - relieved with stool; dizzy with a coughing spell.       Allergies   Allergen Reactions     Carboplatin Difficulty breathing     Reaction to Carboplatin on 8th dose.      Current Outpatient Prescriptions   Medication Sig Dispense Refill     ipratropium - albuterol 0.5 mg/2.5 mg/3 mL (DUONEB) 0.5-2.5 (3) MG/3ML neb solution Take 1 vial (3 mLs) by nebulization daily 1 vial 0     albuterol (PROAIR HFA/PROVENTIL HFA/VENTOLIN HFA) 108 (90 BASE) MCG/ACT Inhaler Inhale 2 puffs into the lungs every 4 hours as needed for shortness of breath / dyspnea or wheezing Use with spacer 1 Inhaler 0     Spacer/Aero-Holding Chambers (SPACER/AERO-HOLD CHAMBER MASK) SHERI 1 Adult size spacer to use with MDI inhalers. 1 each 0     predniSONE (DELTASONE) 20 MG tablet 20mg/daily: 3 tablets the first 3 days, then 2 tabs the next 3 days, then 1 tab daily for the last 3 days 18 tablet 0     cefdinir (OMNICEF) 300 MG capsule Take 1 capsule (300 mg) by mouth daily for 10 days 10 capsule 0     albuterol (2.5 MG/3ML) 0.083% neb solution Take 1 vial (2.5 mg) by nebulization every 6 hours as needed for shortness of breath / dyspnea or wheezing 1 Box 3     order for DME Equipment being ordered: Nebulizer 1 Units 0     LORazepam (ATIVAN) 1 MG tablet Take 1 tablet (1 mg) by mouth every 6 hours as needed (Anxiety, Nausea/Vomiting or Sleep) 90 tablet 1     oxyCODONE (OXYCONTIN) 10 MG 12 hr tablet Take one tablet first thing in the morning, middle of the day and right before bed. 90 tablet 0     dexamethasone (DECADRON) 2 MG tablet Take 1 tablet (2 mg) by mouth every morning 30 tablet 0     albuterol (PROAIR HFA/PROVENTIL HFA/VENTOLIN HFA) 108 (90 BASE) MCG/ACT Inhaler Inhale 2 puffs into the lungs       warfarin (COUMADIN) 2.5 MG tablet Take one to two tablets daily or as directed by the Coumadin clinic 60 tablet 5     HYDROmorphone (DILAUDID) 2 MG tablet Take 1-2 tablets (2-4 mg) by mouth every 3 hours as needed (dyspnea and  / or cough)  200 tablet 0     sertraline (ZOLOFT) 50 MG tablet Take 1 tablet (50 mg) by mouth daily 30 tablet 3     sodium chloride (OCEAN) 0.65 % nasal spray Spray 1 spray into both nostrils every 2 hours as needed for congestion 88 mL 3     umeclidinium-vilanterol (ANORO ELLIPTA) 62.5-25 MCG/INH oral inhaler Inhale 1 puff into the lungs daily as needed Alternate with duoneb q6h  Therapeutic interchange for Combivent Inhaler. 1 Inhaler 11     albuterol (ALBUTEROL) 108 (90 BASE) MCG/ACT Inhaler Inhale 2 puffs into the lungs every 6 hours 1 Inhaler 11     LORazepam (ATIVAN) 0.5 MG tablet Take 1 tablet (0.5 mg) by mouth every 6 hours as needed (Anxiety, Nausea/Vomiting or Sleep) 30 tablet 1     prochlorperazine (COMPAZINE) 5 MG tablet Take 1 tablet (5 mg) by mouth every 6 hours as needed (Nausea/Vomiting) 30 tablet 2     senna (SENOKOT) 8.6 MG tablet Take 1-2 tablets by mouth 2 times daily as needed for constipation 120 tablet 3     order for DME Equipment being ordered: Oxygen 3 liters of oxygen continuous to keep sats >90%. RA sats 88%. 1 Device 0     warfarin (COUMADIN) 5 MG tablet Take 2.5 mg on Tues, Thurs, Sun and 5 mg Mon, Wed, Fri, Sat or as directed by anticoagulation clinic. 72 tablet 0     DiphenhydrAMINE HCl (BENADRYL PO) Take 50 mg by mouth daily as needed       ondansetron (ZOFRAN) 8 MG tablet Take 1 tablet (8 mg) by mouth every 8 hours as needed for nausea 30 tablet 2     omeprazole (PRILOSEC) 20 MG capsule Take 2 capsules (40 mg) by mouth daily 90 capsule 6     order for DME Equipment being ordered: Oxygen 1-2 L NC Keep O2 sats > 90%. RA sats 88% 1 Device 0     prochlorperazine (COMPAZINE) 10 MG tablet Take 1 tablet (10 mg) by mouth every 6 hours as needed (nausea/vomiting) 30 tablet 2     Past Medical History   Diagnosis Date     Anxiety      Depression      Gastro-oesophageal reflux disease      Hyperlipidemia      Hypertension      Ovarian cancer (H) 12/09     Stage IC grade 3 ovarian cancer     Pulmonary  nodules      Renal disease      insuffiency     Shortness of breath      VAIN III (vaginal intraepithelial neoplasia grade III) 3/24/11     Past Surgical History   Procedure Laterality Date     Vulva surgery  3/24/11     VAIN III     Hysterectomy radical  12/09     Stage IC grade 3 ovarian cancer,     Thoracoscopic wedge resection lung  11/16/2011     Procedure:THORACOSCOPIC WEDGE RESECTION LUNG; Left Thoracoscopic  Lower Lobe Wedge Resection with Pleura; Surgeon:TAMARA SERVIN; Location:[facility] OR     Biopsy vaginal  1/31/2013     Procedure: BIOPSY VAGINAL;  Colposcopy, CO2 Laser to Vagina, Upper Vaginal biopsies;  Surgeon: Marietta Sexton MD;  Location: [facility] OR     Laser co2 vagina  1/31/2013     Procedure: LASER CO2 VAGINA;;  Surgeon: Marietta Sexton MD;  Location: [facility] OR           /62 (BP Location: Right arm, Patient Position: Chair, Cuff Size: Adult Large)  Pulse 119  Temp 100.3  F (37.9  C) (Oral)  Resp 18  Wt 75.9 kg (167 lb 4.8 oz)  SpO2 93%  BMI 30.6 kg/m2    General: tired appearing, slightly pale, well groomed, sitting in chair, in NAD  Eyes: EOMI, sclera clear  HEENT: NC/AT; mucous membranes moist  Lungs: no real increased work of breathing when talking  Neuro: A&O x 3; CN II-XII grossly intact;    Neuropsych: alert, tearful intermittently, good eye contact, engaged, sensorium intact.      Key Data Reviewed:  GFR 40      Impression:     Etelvina is a 66 yr old female with recurrent metastatic ovarian cancer: She is getting palliative chemotherapy with taxol and avastin with hope that it can help her feel better and give her more time but she often wwonders if the treatment is helping or just wearing her out. She does not want to stop treatment as she sees this as giving up. She does not want home help, home care or home hospice as she feels her house is a mess since she can no longer clean it up.  Cough continues as well as sig dyspnea with exertion.      Recommendations & Counseling:    Cough:   -  continue Oxycontin 10mg TID  - continue hydromorphone 2-4 mg every 3 hours as needed for cough and shortness of breath.      Anxiety / depression   - continue zoloft 50mg daily  - lorazepam 1 tablet as needed twice a day anxiety     Constipation:   - senna 1 tablet twice a day    Meet with palliative care , Vicki Chris.    Hospice consult for more information placed and we will try to figure out if you can meet with hospice outside of your home.     Thank you for involving us in the patient's care. 35 minutes face time over half spent counseling.    Darlene Peck MD / Palliative Medicine / Pager [phone] / After-Hours Answering Service [phone] / Main Palliative Clinic - [phone] / [facility] Inpatient Team Consult Pager [phone] (answered 8am-430pm M-F)

## 2017-03-07 NOTE — PROGRESS NOTES
"Palliative Care Outpatient Clinic Progress Note    Patient Name:  Etelvina Jacobs  Primary Provider:  Clinic, [facility]    Chief Complaint: metastatic ovarian cancer with; shortness of breath    Interim History:  Etelvina Jacobs 66 year old female returns to be seen by palliative care today.  Etelvina states she is only feeling \"so so\".  Has had a cold for months now. Breathing not good, unchanged. Coughing spells that are not productive.     Crying a lot. Only has her  to talk to and often he can not hear her so she doesn't feel like she has anyone to talk to at times.      Debating if should continue treatment or not.  Not sure where she would stay as her house is very cluttered. She doesn't even want to have an informational interview with hospice because she is embarrassed by the clutter.     Treatment taking a toll on her body. Loss of balance now with treatment. Needs a walker. Needs help with everything now as well. Help walking. Can bath self but takes a while. Her  helps her get dressed as she loses.     Oxcontin 10mg TID and really not needing any of the hydromorphone.  Took 2 hydromorphone today with a coughing spasm but hasn't taken them for the past weeks.     Mood is terrible, feels \"bitchy\"  Frustrated as wants to do things and wants to feel normal and doesn't     Finished steroids this morning as was taking an abx and steroids for 10 days as was on it for a cough and cold. Prescribed by urgent care center with farhan Ash.     Review of Systems:  ROS: 10 point ROS neg other than the symptoms noted above in the HPI and here  No nausea, will vomit with coughing spell; no sig diarrhea, come constipation - relieved with stool; dizzy with a coughing spell.       Allergies   Allergen Reactions     Carboplatin Difficulty breathing     Reaction to Carboplatin on 8th dose.      Current Outpatient Prescriptions   Medication Sig Dispense Refill     ipratropium - albuterol 0.5 mg/2.5 mg/3 mL " (DUONEB) 0.5-2.5 (3) MG/3ML neb solution Take 1 vial (3 mLs) by nebulization daily 1 vial 0     albuterol (PROAIR HFA/PROVENTIL HFA/VENTOLIN HFA) 108 (90 BASE) MCG/ACT Inhaler Inhale 2 puffs into the lungs every 4 hours as needed for shortness of breath / dyspnea or wheezing Use with spacer 1 Inhaler 0     Spacer/Aero-Holding Chambers (SPACER/AERO-HOLD CHAMBER MASK) SHERI 1 Adult size spacer to use with MDI inhalers. 1 each 0     predniSONE (DELTASONE) 20 MG tablet 20mg/daily: 3 tablets the first 3 days, then 2 tabs the next 3 days, then 1 tab daily for the last 3 days 18 tablet 0     cefdinir (OMNICEF) 300 MG capsule Take 1 capsule (300 mg) by mouth daily for 10 days 10 capsule 0     albuterol (2.5 MG/3ML) 0.083% neb solution Take 1 vial (2.5 mg) by nebulization every 6 hours as needed for shortness of breath / dyspnea or wheezing 1 Box 3     order for DME Equipment being ordered: Nebulizer 1 Units 0     LORazepam (ATIVAN) 1 MG tablet Take 1 tablet (1 mg) by mouth every 6 hours as needed (Anxiety, Nausea/Vomiting or Sleep) 90 tablet 1     oxyCODONE (OXYCONTIN) 10 MG 12 hr tablet Take one tablet first thing in the morning, middle of the day and right before bed. 90 tablet 0     dexamethasone (DECADRON) 2 MG tablet Take 1 tablet (2 mg) by mouth every morning 30 tablet 0     albuterol (PROAIR HFA/PROVENTIL HFA/VENTOLIN HFA) 108 (90 BASE) MCG/ACT Inhaler Inhale 2 puffs into the lungs       warfarin (COUMADIN) 2.5 MG tablet Take one to two tablets daily or as directed by the Coumadin clinic 60 tablet 5     HYDROmorphone (DILAUDID) 2 MG tablet Take 1-2 tablets (2-4 mg) by mouth every 3 hours as needed (dyspnea and  / or cough) 200 tablet 0     sertraline (ZOLOFT) 50 MG tablet Take 1 tablet (50 mg) by mouth daily 30 tablet 3     sodium chloride (OCEAN) 0.65 % nasal spray Spray 1 spray into both nostrils every 2 hours as needed for congestion 88 mL 3     umeclidinium-vilanterol (ANORO ELLIPTA) 62.5-25 MCG/INH oral inhaler  Inhale 1 puff into the lungs daily as needed Alternate with duoneb q6h  Therapeutic interchange for Combivent Inhaler. 1 Inhaler 11     albuterol (ALBUTEROL) 108 (90 BASE) MCG/ACT Inhaler Inhale 2 puffs into the lungs every 6 hours 1 Inhaler 11     LORazepam (ATIVAN) 0.5 MG tablet Take 1 tablet (0.5 mg) by mouth every 6 hours as needed (Anxiety, Nausea/Vomiting or Sleep) 30 tablet 1     prochlorperazine (COMPAZINE) 5 MG tablet Take 1 tablet (5 mg) by mouth every 6 hours as needed (Nausea/Vomiting) 30 tablet 2     senna (SENOKOT) 8.6 MG tablet Take 1-2 tablets by mouth 2 times daily as needed for constipation 120 tablet 3     order for DME Equipment being ordered: Oxygen 3 liters of oxygen continuous to keep sats >90%. RA sats 88%. 1 Device 0     warfarin (COUMADIN) 5 MG tablet Take 2.5 mg on Tues, Thurs, Sun and 5 mg Mon, Wed, Fri, Sat or as directed by anticoagulation clinic. 72 tablet 0     DiphenhydrAMINE HCl (BENADRYL PO) Take 50 mg by mouth daily as needed       ondansetron (ZOFRAN) 8 MG tablet Take 1 tablet (8 mg) by mouth every 8 hours as needed for nausea 30 tablet 2     omeprazole (PRILOSEC) 20 MG capsule Take 2 capsules (40 mg) by mouth daily 90 capsule 6     order for DME Equipment being ordered: Oxygen 1-2 L NC Keep O2 sats > 90%. RA sats 88% 1 Device 0     prochlorperazine (COMPAZINE) 10 MG tablet Take 1 tablet (10 mg) by mouth every 6 hours as needed (nausea/vomiting) 30 tablet 2     Past Medical History   Diagnosis Date     Anxiety      Depression      Gastro-oesophageal reflux disease      Hyperlipidemia      Hypertension      Ovarian cancer (H) 12/09     Stage IC grade 3 ovarian cancer     Pulmonary nodules      Renal disease      insuffiency     Shortness of breath      VAIN III (vaginal intraepithelial neoplasia grade III) 3/24/11     Past Surgical History   Procedure Laterality Date     Vulva surgery  3/24/11     VAIN III     Hysterectomy radical  12/09     Stage IC grade 3 ovarian cancer,      Thoracoscopic wedge resection lung  11/16/2011     Procedure:THORACOSCOPIC WEDGE RESECTION LUNG; Left Thoracoscopic  Lower Lobe Wedge Resection with Pleura; Surgeon:TAMARA SERVIN; Location:[facility] OR     Biopsy vaginal  1/31/2013     Procedure: BIOPSY VAGINAL;  Colposcopy, CO2 Laser to Vagina, Upper Vaginal biopsies;  Surgeon: Marietta Sexton MD;  Location: [facility] OR     Laser co2 vagina  1/31/2013     Procedure: LASER CO2 VAGINA;;  Surgeon: Marietta Sexton MD;  Location: [facility] OR           /62 (BP Location: Right arm, Patient Position: Chair, Cuff Size: Adult Large)  Pulse 119  Temp 100.3  F (37.9  C) (Oral)  Resp 18  Wt 75.9 kg (167 lb 4.8 oz)  SpO2 93%  BMI 30.6 kg/m2    General: tired appearing, slightly pale, well groomed, sitting in chair, in NAD  Eyes: EOMI, sclera clear  HEENT: NC/AT; mucous membranes moist  Lungs: no real increased work of breathing when talking  Neuro: A&O x 3; CN II-XII grossly intact;    Neuropsych: alert, tearful intermittently, good eye contact, engaged, sensorium intact.      Key Data Reviewed:  GFR 40      Impression:     Etelvina is a 66 yr old female with recurrent metastatic ovarian cancer: She is getting palliative chemotherapy with taxol and avastin with hope that it can help her feel better and give her more time but she often wwonders if the treatment is helping or just wearing her out. She does not want to stop treatment as she sees this as giving up. She does not want home help, home care or home hospice as she feels her house is a mess since she can no longer clean it up.  Cough continues as well as sig dyspnea with exertion.      Recommendations & Counseling:    Cough:   - continue Oxycontin 10mg TID  - continue hydromorphone 2-4 mg every 3 hours as needed for cough and shortness of breath.      Anxiety / depression   - continue zoloft 50mg daily  - lorazepam 1 tablet as needed twice a day anxiety     Constipation:   - senna 1 tablet twice a day    Meet with palliative care  , Vicki Chris.    Hospice consult for more information placed and we will try to figure out if you can meet with hospice outside of your home.     Thank you for involving us in the patient's care. 35 minutes face time over half spent counseling.  Darlene Peck MD / Palliative Medicine / Pager [phone] / After-Hours Answering Service [phone] / Main Palliative Clinic - [phone] / [facility] Inpatient Team Consult Pager [phone] (answered 8am-430pm M-F)

## 2017-03-07 NOTE — LETTER
"3/7/2017       RE: Etelvina Jacobs  [address]  [place] [facility] [address]     Dear Colleague,    Thank you for referring your patient, Etelvina Jacobs, to the [facility] at [facility]. Please see a copy of my visit note below.    Palliative Care Clinical Social Work Return Appointment    PLEASE NOTE:  THIS IS A MENTAL HEALTH NOTE.  OTHER PROVIDERS VIEWING THIS NOTE SHOULD USE THIS ONLY FOR UNDERSTANDING THE CONTEXT OF THE PATIENT S EXPERIENCE.  TOPICS DESCRIBED IN THIS NOTE SHOULD NOT BE REFERENCED TO THE PATIENT BY MEDICAL PROVIDERS.    Etelvina is a 66 year old woman with metastatic ovarian cancer, seen today for a return appointment.  present and supportive.    Mental Status Exam:(List all that apply)      Appearance: Appropriate      Eye Contact: Good       Orientation: Yes, x4      Mood: Anxious      Affect: Anxious      Thought Content: Clear         Thought Form: Logical      Psychomotor Behavior: Normal    Visit summary:   Mental Health (Anxiety, depression, other symptoms):   Etelvina voices significant anxiety both about the future and about current medical decisions. She understands that her oncologist recommends hospice care at this point. However, as long as her team is willing and she is physically able, Etelvina talks about wanting to continue chemotherapy, largely because of fear of dying and fear of having given up rather than \"fighting to the end\". Her anxiety is overwhelming at times and she expresses distress with not being able to know at this point whether chemo is causing her more harm, shortening her life through side effects, or whether it is extending her life.     Etelvina tells me that she continues to receive relief and comfort from spending time with their little dog, Carlin. Her  remains a support. She loves being outdoors, in the woods and by lakes, and going to the [facility]. She shares photos of travel up north last " summer. Recently, although they have gone to hood they enjoy, she has not been getting out of the vehicle due to fatigue and shortness of breath.    Adjustment to Illness: Etelvina tells me that she feels like she has had a bad cold she cannot kick since late last summer. She does understand that her oncologist thinks it may be the cancer that is causing her symptoms of shortness of breath and coughing. She continues to feel unsure, for herself. She continues to hope that perhaps if she increases exercise it will help her to get better, as it has in the past. She also continues to hope that even though her oncologist does not expect this, she could possibly get better from the chemo. She remembers times in the past when she was able to get better after going through chemo.    During our discussion she tells me she has decided to continue with the chemo plan for now. However, she is also receptive to meeting a hospice  to learn more about it. A big barrier for her is feeling embarrassed about their house being cluttered and not as clean as she used to keep it when feeling well. We discuss the idea of seeing if hospice may be able to do an initial meeting with her in a community setting instead of at her house. She agrees she would be open to this and she and her  come up with [facility] in [place] as a place they feel comfortable and could do a hospice initial meeting.    Relationships: She is appreciative of support from her  and sister. Her sister recently gave her a walker which she is using today. She finds it very helpful and plans to try using it to take short walks outdoors. She has found regular walks very helpful for mood and for physical well being in the past and hopes they will help now.    End of Life and Advance Care Planning: Much fear about dying expressed. She is trying to figure out what decisions and actions are likely to lead to longest possible life at this point. Her  "priority is quantity, not just quality, she tells me. She expresses fears about her 's coping after she dies - \"He is disabled, who will take care of him?\" She expresses that belief in God gives her a bit of comfort in terms of facing death herself.    Internal Resources: Good memories of travel and being in nature, desire to continue enjoying aspects of her life she enjoys, like being with her dog and , getting outdoors.     Main therapeutic interventions provided this session include:  Facilitating processing of feelings and thoughts, facilitating life review; re-framing; motivational interviewing; psychoeducation    Plan:  I will discuss options for an initial hospice visit with [facility] per Etelvina's request, and will reach out to her to share options. We also agree that I will follow up during upcoming infusion visits to see Etelvina, since she has not felt up to scheduling recently, and has not been replying to phone messages.    Time spent with patient/family:  50 minutes    Palliative Care Counseling Treatment Plan    Client's Name: Etelvina Jacobs  YOB: 1950    Date: 11/23/2016    Initial DSM5 Diagnoses:   296.32 Major Depressive Disorder, Recurrent Episode, Moderate With mixed features  300.01 (F41.0) Panic Disorder  300.02 (F41.1) Generalized Anxiety Disorder      Referral / Collaboration:  .Referral to another professional/service is not indicated at this time.      Anticipated number of sessions to complete episode of care:  10      Treatment Goal(s)  Date Goal Dates  Reviewed Status    Goal 1:  Client will Experience reduction in anxiety.    Continued    Objective #1A (Client Action)  Client will Identify triggers for anxiety/panic attacks, Identify early signs/symptoms of anxiety and Practice self awareness exercises.    Intervention(s)  Therapist will Provide psychoeducation and Facilitate structured problem solving .    Continued    Objective " #1B  Client will Identify effective coping strategies.    Intervention(s)  Therapist will Provide psychoeducation, Provide behavioral intervention training and Facilitate structured problem solving. Provide motivational interviewing.    Continued    Objective #1C: Client will complete advance health care directive.    Interventions:  Therapist will facilitate advance care planning as well as supporting patient in facing anxiety related barriers to addressing this.             Date Goal Dates  Reviewed Status    Goal 2:  Client will effectively address grief / loss and depressed mood.    Continued    Objective #2A (Client Action)  Client will Increase understanding of loss and grief and Develop strategies for coping with grief/loss.    Intervention(s) (Therapist Action)  Therapist will Provide psychoeducation, Facilitate processing of thoughts and feelings and Facilitate structured problem solving.    Continued    Objective #2B  Client will Identify and use personally meaningful ritual(s) and Participate in Life Legacy work and Participate in life review.    Intervention(s)  Therapist will Provide psychoeducation, Facilitate Life Legacy work and Facilitate processing of thoughts and feelings.    Continued       Client reviewed and agreed to this plan on Dec 14, 2016.    PARAM Jiang, LICSW      DO NOT SEND ANY LETTERS      Again, thank you for allowing me to participate in the care of your patient.      Sincerely,    JOHNY Tiwari

## 2017-03-07 NOTE — NURSING NOTE
"Etelvina Jacobs is a 66 year old female who presents for:  Chief Complaint   Patient presents with     Oncology Clinic Visit     Return: ovarian ca         Initial Vitals:  /62 (BP Location: Right arm, Patient Position: Chair, Cuff Size: Adult Large)  Pulse 119  Temp 100.3  F (37.9  C) (Oral)  Resp 18  Wt 75.9 kg (167 lb 4.8 oz)  SpO2 93%  BMI 30.6 kg/m2 Estimated body mass index is 30.6 kg/(m^2) as calculated from the following:    Height as of 2/7/17: 1.575 m (5' 2\").    Weight as of this encounter: 75.9 kg (167 lb 4.8 oz).. Body surface area is 1.82 meters squared. BP completed using cuff size: large  No Pain (0) No LMP recorded. Patient has had a hysterectomy. Allergies and medications reviewed.     Medications: MEDICATION REFILLS NEEDED TODAY.  Pharmacy name entered into [employer]:    [facility] - [place], [place] - [address] [facility] [place], [place] - [address] [place] [address] [phone]  [facility] [id number] [place] [id number] [facility] [address]    Comments: Oxycontin     6 minutes for nursing intake (face to face time)   Oneida Leigh CMA        "

## 2017-03-08 NOTE — PATIENT INSTRUCTIONS
Contact numbers:  Triage Main Line: [phone]  After hours: [phone]    Call with chills and/or temperature greater than or equal to 100.5 and questions or concerns.    If after hours, weekends, or holidays, call main hospital  at  [phone] and ask for Oncology doctor on call.           March 2017 Sunday Monday Tuesday Wednesday Thursday Friday Saturday                  1     [facility]   10:00 AM   (15 min.)   [facility]   [facility] Lab Draw     [facility] ONC INFUSION 180   10:30 AM   (180 min.)   [facility] ONCOL[facility]Y INFUSION   [facility] 2     3     4       5     6     7     [facility] RETURN    1:15 PM   (30 min.)   Darlene Peck MD   [facility] 8     [facility] DRAW    9:30 AM   (15 min.)   [facility]   [facility] Lab Draw     [facility] ONC INFUSION 120   10:00 AM   (120 min.)   [facility] ONCOL[facility] INFUSION   [facility] 9     10     11       12     13     14     15     [facility]   10:00 AM   (15 min.)    [facility]     [facility] ONC INFUSION 180   10:30 AM   (180 min.)    ONCOLOGY INFUSION   [facility] 16     17     18       19     20     21     22     [facility] D[facility]   10:00 AM   (15 min.)    [facility] D[facility]   [facility] Lab Draw     [facility] ONC INFUSION 120   10:30 AM   (120 min.)    ONCOLOGY INFUSION   [facility] 23     24     25       26     27     28     29     [facility] DRAW    7:30 AM   (15 min.)    [facility] DRAW   [facility] Lab Draw     [facility] RETURN ACTIVE TREATMENT    8:05 AM   (40 min.)   Valentina Haney APRN CNP   [facility]     [facility] ONC INFUSION 180    9:00 AM   (180 min.)    ONCOLOGY INFUSION   [facility] 30 31 April 2017 Sunday Monday Tuesday Wednesday Thursday Friday Saturday                                 1       2      3     [facility] DRAW    9:30 AM   (15 min.)   [facility]   [facility]     CT CHEST/ABDOMEN/PELVIS W    9:45 AM   (20 min.)   UC[facility]2   [facility] CT     [facility] RETURN ACTIVE TREATMENT    1:25 PM   (20 min.)   Erica Markham MD   [facility] 4     5     [facility] DRAW    9:00 AM   (15 min.)   [facility]   [facility] Draw     [facility] RETURN ACTIVE TREATMENT    9:25 AM   (40 min.)   Valentina Haney APRN CNP   [facility]     [facility] ONC INFUSION 180   10:30 AM   (180 min.)   [facility] ONCOLOGY INFUSION   [facility] 6     7     8       9     10     11     12     [facility] DRAW    8:30 AM   (15 min.)    [facility] DRAW   [facility] Lab Draw     [facility] ONC INFUSION 120    9:00 AM   (120 min.)   [facility] ONCOLOGY INFUSION   [facility] 13     14     15       16     17     18     19     [facility] DRAW    8:30 AM   (15 min.)    [facility] DRAW   [facility]     [facility] ONC INFUSION 180    9:00 AM   (180 min.)   [facility] ONCOLOGY INFUSION   [facility] 20     21     22       23     24     25     [facility] RETURN    1:15 PM   (30 min.)   Darlene Peck MD   [facility] 26     [facility] DRAW    8:30 AM   (15 min.)    [facility] DRAW   [facility] Lab Draw     [facility] ONC INFUSION 120    9:00 AM   (120 min.)   [facility] ONCOLOGY INFUSION   [facility] 27     28     29       30                                                Lab Results:  Recent Results (from the past 12 hour(s))   CBC with platelets differential    Collection Time: 03/08/17  9:53 AM   Result Value Ref Range    WBC 6.0 4.0 - 11.0 10e9/L    RBC Count 3.61 (L) 3.8 - 5.2 10e12/L    Hemoglobin 9.3 (L) 11.7 - 15.7 g/dL    Hematocrit 31.1 (L) 35.0 - 47.0 %    MCV 86 78 - 100 fl    MCH 25.8 (L) 26.5 - 33.0 pg    MCHC 29.9 (L) 31.5 - 36.5 g/dL    RDW 23.5 (H)  10.0 - 15.0 %    Platelet Count 294 150 - 450 10e9/L    Diff Method Automated Method     % Neutrophils 77.3 %    % Lymphocytes 11.0 %    % Monocytes 7.7 %    % Eosinophils 1.3 %    % Basophils 0.2 %    % Immature Granulocytes 2.5 %    Nucleated RBCs 0 0 /100    Absolute Neutrophil 4.6 1.6 - 8.3 10e9/L    Absolute Lymphocytes 0.7 (L) 0.8 - 5.3 10e9/L    Absolute Monocytes 0.5 0.0 - 1.3 10e9/L    Absolute Eosinophils 0.1 0.0 - 0.7 10e9/L    Absolute Basophils 0.0 0.0 - 0.2 10e9/L    Abs Immature Granulocytes 0.2 0 - 0.4 10e9/L    Absolute Nucleated RBC 0.0    Magnesium    Collection Time: 03/08/17  9:53 AM   Result Value Ref Range    Magnesium 2.0 1.6 - 2.3 mg/dL   Potassium    Collection Time: 03/08/17  9:53 AM   Result Value Ref Range    Potassium 3.8 3.4 - 5.3 mmol/L

## 2017-03-08 NOTE — MR AVS SNAPSHOT
After Visit Summary   3/8/2017    Etelvina Jacobs    MRN: [id number]           Patient Information     Date Of Birth          1950        Visit Information        Provider Department      3/8/2017 10:00 AM UC 28 ATC;  ONCOLOGY INFUSION [facility]        Today's Diagnoses     Ovarian cancer, left (H)    -  1    Encounter for long-term current use of medication          Care Instructions    Contact numbers:  Triage Main Line: [phone]  After hours: [phone]    Call with chills and/or temperature greater than or equal to 100.5 and questions or concerns.    If after hours, weekends, or holidays, call main hospital  at  [phone] and ask for Oncology doctor on call.           March 2017 Sunday Monday Tuesday Wednesday Thursday Friday Saturday                  1     [facility] DRAW   10:00 AM   (15 min.)    MASONIC LAB DRAW   [facility] Masonic Lab Draw     UMP ONC INFUSION 180   10:30 AM   (180 min.)    ONCOLOGY INFUSION   [facility] 2     3     4       5     6     7     UMP RETURN    1:15 PM   (30 min.)   Darlene Peck MD   [facility] 8     [facility] DRAW    9:30 AM   (15 min.)    MASONIC LAB DRAW   [facility] Masonic Lab Draw     UMP ONC INFUSION 120   10:00 AM   (120 min.)    ONCOLOGY INFUSION   [facility] 9     10     11       12     13     14     15     [facility] DRAW   10:00 AM   (15 min.)    MASONIC LAB DRAW   [facility] Masonic Lab Draw     UMP ONC INFUSION 180   10:30 AM   (180 min.)    ONCOLOGY INFUSION   [facility] 16     17     18       19     20     21     22     [facility] DRAW   10:00 AM   (15 min.)    MASONIC LAB DRAW   [facility] Masonic Lab Draw     UMP ONC INFUSION 120   10:30 AM   (120 min.)    ONCOLOGY INFUSION   [facility] 23     24     25       26     27     28     29     [facility] DRAW    7:30  AM   (15 min.)   [facility] DRAW   [facility] Masonic Lab Draw     UMP RETURN ACTIVE TREATMENT    8:05 AM   (40 min.)   Valentina Haney APRN CNP   [facility]     UMP ONC INFUSION 180    9:00 AM   (180 min.)   UC ONCOLOGY INFUSION   [facility] 30 31 April 2017 Sunday Monday Tuesday Wednesday Thursday Friday Saturday                                 1       2     3     [facility] LAB DRAW    9:30 AM   (15 min.)    [facility] LAB DRAW   [facility]     CT CHEST/ABDOMEN/PELVIS W    9:45 AM   (20 min.)   UCCT2   [facility] CT     UMP RETURN ACTIVE TREATMENT    1:25 PM   (20 min.)   Erica Markham MD   [facility] 4     5     [facility] LAB DRAW    9:00 AM   (15 min.)    MASONIC LAB DRAW   [facility] Lab Draw     UMP RETURN ACTIVE TREATMENT    9:25 AM   (40 min.)   Valentina Haney APRN CNP   [facility]     UMP ONC INFUSION 180   10:30 AM   (180 min.)   UC ONCOLOGY INFUSION   [facility] 6     7     8       9     10     11     12     [facility] LAB DRAW    8:30 AM   (15 min.)    MASONIC LAB DRAW   [facility] Lab Draw     UMP ONC INFUSION 120    9:00 AM   (120 min.)    ONCOLOGY INFUSION   [facility] 13     14     15       16     17     18     19     [facility] DRAW    8:30 AM   (15 min.)    [facility] LAB DRAW   [facility]     UMP ONC INFUSION 180    9:00 AM   (180 min.)    ONCOLOGY INFUSION   [facility] 20     21     22       23     24     25     UMP RETURN    1:15 PM   (30 min.)   Darlene Peck MD   [facility] 26     [facility] LAB DRAW    8:30 AM   (15 min.)    MASONIC LAB DRAW   [facility] Masonic Lab Draw     UMP ONC INFUSION 120    9:00 AM   (120 min.)    ONCOLOGY INFUSION   [facility] 27     28     29       30                                                 Lab Results:  Recent Results (from the past 12 hour(s))   CBC with platelets differential    Collection Time: 03/08/17  9:53 AM   Result Value Ref Range    WBC 6.0 4.0 - 11.0 10e9/L    RBC Count 3.61 (L) 3.8 - 5.2 10e12/L    Hemoglobin 9.3 (L) 11.7 - 15.7 g/dL    Hematocrit 31.1 (L) 35.0 - 47.0 %    MCV 86 78 - 100 fl    MCH 25.8 (L) 26.5 - 33.0 pg    MCHC 29.9 (L) 31.5 - 36.5 g/dL    RDW 23.5 (H) 10.0 - 15.0 %    Platelet Count 294 150 - 450 10e9/L    Diff Method Automated Method     % Neutrophils 77.3 %    % Lymphocytes 11.0 %    % Monocytes 7.7 %    % Eosinophils 1.3 %    % Basophils 0.2 %    % Immature Granulocytes 2.5 %    Nucleated RBCs 0 0 /100    Absolute Neutrophil 4.6 1.6 - 8.3 10e9/L    Absolute Lymphocytes 0.7 (L) 0.8 - 5.3 10e9/L    Absolute Monocytes 0.5 0.0 - 1.3 10e9/L    Absolute Eosinophils 0.1 0.0 - 0.7 10e9/L    Absolute Basophils 0.0 0.0 - 0.2 10e9/L    Abs Immature Granulocytes 0.2 0 - 0.4 10e9/L    Absolute Nucleated RBC 0.0    Magnesium    Collection Time: 03/08/17  9:53 AM   Result Value Ref Range    Magnesium 2.0 1.6 - 2.3 mg/dL   Potassium    Collection Time: 03/08/17  9:53 AM   Result Value Ref Range    Potassium 3.8 3.4 - 5.3 mmol/L             Follow-ups after your visit        Your next 10 appointments already scheduled     Mar 15, 2017 10:00 AM CDT   Masonic Lab Draw with UC [facility]ONIC LAB DRAW   [facility] Lab Draw ([facility])    [address] [place] [id number] [phone]            Mar 15, 2017 10:30 AM CDT   Infusion 180 with  ONCOLOGY INFUSION, UC 22 ATC   [facility] ([facility])    [address] [place] [id number] [phone]            Mar 22, 2017 10:00 AM CDT   Masonic Lab Draw with  MASONIC LAB DRAW   [facility]ic Lab Draw ([email or web address] and [facility])    [address] [place] [address]  [facility] [address]   [phone]            Mar 22, 2017 10:30 AM CDT   Infusion 120 with UC ONCOLOGY INFUSION, UC 30 ATC   [facility] ([facility])    [address] [place] [address]   [phone]            Mar 29, 2017  7:30 AM CDT   Masonic Lab Draw with UC MASONIC LAB DRAW   [facility] Lab Draw ([facility])    [address] [place] [address]   [phone]            Mar 29, 2017  8:20 AM CDT   (Arrive by 8:05 AM)   Return Active Treatment with LEANDRA Billy CNP   [facility] ([facility])    [address] [place] [address]   [phone]            Mar 29, 2017  9:00 AM CDT   Infusion 180 with UC ONCOLOGY INFUSION, UC 24 ATC   [facility] ([facility])    [address] [place] [address]   [phone]              Who to contact     If you have questions or need follow up information about today's clinic visit or your schedule please contact [facility] directly at [phone].  Normal or non-critical lab and imaging results will be communicated to you by LSAT Freedom[employer], letter or phone within 4 business days after the clinic has received the results. If you do not hear from us within 7 days, please contact the clinic through [employer] or phone. If you have a critical or abnormal lab result, we will notify you by phone as soon as possible.  Submit refill requests through Frequent Browser or call your pharmacy and they will forward the refill request to us. Please allow 3 business days for your refill to be completed.          Additional Information About Your Visit        Frequent Browser Information     Frequent Browser gives you secure access to your electronic health record. If you see a primary care provider, you can also send  messages to your care team and make appointments. If you have questions, please call your primary care clinic.  If you do not have a primary care provider, please call [phone] and they will assist you.        Care EveryWhere ID     This is your Care EveryWhere ID. This could be used by other organizations to access your [facility] medical records  [id number]        Your Vitals Were     Pulse Temperature Respirations Pulse Oximetry BMI (Body Mass Index)       98 98.9  F (37.2  C) (Oral) 14 91% 30.95 kg/m2        Blood Pressure from Last 3 Encounters:   03/08/17 130/73   03/07/17 115/62   03/01/17 144/81    Weight from Last 3 Encounters:   03/08/17 76.7 kg (169 lb 3.2 oz)   03/07/17 75.9 kg (167 lb 4.8 oz)   03/01/17 76.4 kg (168 lb 6.9 oz)              We Performed the Following     CBC with platelets differential     Magnesium     Potassium        Primary Care Provider Office Phone # Fax #    [facility] [phone] [address] [facility] [address]        Thank you!     Thank you for choosing [facility]  for your care. Our goal is always to provide you with excellent care. Hearing back from our patients is one way we can continue to improve our services. Please take a few minutes to complete the written survey that you may receive in the mail after your visit with us. Thank you!             Your Updated Medication List - Protect others around you: Learn how to safely use, store and throw away your medicines at www.disposemymeds.org.          This list is accurate as of: 3/8/17 11:18 AM.  Always use your most recent med list.                   Brand Name Dispense Instructions for use    * albuterol 108 (90 BASE) MCG/ACT Inhaler    albuterol    1 Inhaler    Inhale 2 puffs into the lungs every 6 hours       * albuterol (2.5 MG/3ML) 0.083% neb solution     1 Box    Take 1 vial (2.5 mg) by nebulization every 6 hours as needed for shortness of breath /  dyspnea or wheezing       BENADRYL PO      Take 50 mg by mouth daily as needed       dexamethasone 2 MG tablet    DECADRON    30 tablet    Take 1 tablet (2 mg) by mouth every morning       HYDROmorphone 2 MG tablet    DILAUDID    200 tablet    Take 1-2 tablets (2-4 mg) by mouth every 3 hours as needed (dyspnea and??/ or cough)       LORazepam 1 MG tablet    ATIVAN    90 tablet    Take 1 tablet (1 mg) by mouth every 6 hours as needed (Anxiety, Nausea/Vomiting or Sleep)       omeprazole 20 MG CR capsule    priLOSEC    90 capsule    Take 2 capsules (40 mg) by mouth daily       ondansetron 8 MG tablet    ZOFRAN    30 tablet    Take 1 tablet (8 mg) by mouth every 8 hours as needed for nausea       * order for DME     1 Device    Equipment being ordered: Oxygen 1-2 L NC Keep O2 sats > 90%. RA sats 88%       * order for DME     1 Device    Equipment being ordered: Oxygen 3 liters of oxygen continuous to keep sats >90%. RA sats 88%.       * order for DME     1 Units    Equipment being ordered: Nebulizer       oxyCODONE 10 MG 12 hr tablet    OXYCONTIN    90 tablet    Take one tablet first thing in the morning, middle of the day and right before bed.       predniSONE 20 MG tablet    DELTASONE    18 tablet    20mg/daily: 3 tablets the first 3 days, then 2 tabs the next 3 days, then 1 tab daily for the last 3 days       prochlorperazine 5 MG tablet    COMPAZINE    30 tablet    Take 1 tablet (5 mg) by mouth every 6 hours as needed (Nausea/Vomiting)       senna 8.6 MG tablet    SENOKOT    120 tablet    Take 1-2 tablets by mouth 2 times daily as needed for constipation       sertraline 50 MG tablet    ZOLOFT    30 tablet    Take 1 tablet (50 mg) by mouth daily       sodium chloride 0.65 % nasal spray    OCEAN    88 mL    Spray 1 spray into both nostrils every 2 hours as needed for congestion       spacer/aero-hold chamber mask Alexandra     1 each    1 Adult size spacer to use with MDI inhalers.       umeclidinium-vilanterol 62.5-25  MCG/INH oral inhaler    ANORO ELLIPTA    1 Inhaler    Inhale 1 puff into the lungs daily as needed Alternate with duoneb q6h Therapeutic interchange for Combivent Inhaler.       VIOS AEROSOL DELIVERY SYSTEM Misc      UTD       * warfarin 5 MG tablet    COUMADIN    72 tablet    Take 2.5 mg on Tues, Thurs, Sun and 5 mg Mon, Wed, Fri, Sat or as directed by anticoagulation clinic.       * warfarin 2.5 MG tablet    COUMADIN    60 tablet    Take one to two tablets daily or as directed by the Coumadin clinic       * Notice:  This list has 7 medication(s) that are the same as other medications prescribed for you. Read the directions carefully, and ask your doctor or other care provider to review them with you.

## 2017-03-08 NOTE — PROGRESS NOTES
Infusion Nursing Note:  Etelvina Jacobs presents for C3D22 Taxol    Note: pt feeling well today, continues to be short of breath with exertion, she's feeling better today than she has in the past couple days though.    Treatment Conditions:  Lab Results   Component Value Date    HGB 9.3 03/08/2017     Lab Results   Component Value Date    WBC 6.0 03/08/2017      Lab Results   Component Value Date    ANEU 4.6 03/08/2017     Lab Results   Component Value Date     03/08/2017      Lab Results   Component Value Date     02/15/2017                   Lab Results   Component Value Date    POTASSIUM 3.8 03/08/2017           Lab Results   Component Value Date    MAG 2.0 03/08/2017            Lab Results   Component Value Date    CR 1.34 02/15/2017                   Lab Results   Component Value Date    LUDWIG 8.8 02/15/2017                Lab Results   Component Value Date    BILITOTAL 0.4 02/15/2017           Lab Results   Component Value Date    ALBUMIN 2.9 02/15/2017                    Lab Results   Component Value Date    ALT 12 02/15/2017           Lab Results   Component Value Date    AST 18 02/15/2017     Results reviewed, labs MET treatment parameters, ok to proceed with treatment.    Intravenous Access:  Implanted Port.    Post Infusion Assessment:  Patient tolerated infusion without incident.  Blood return noted pre and post infusion.  No evidence of extravasations.  Access discontinued per protocol.    Discharge Plan:   Patient declined prescription refills.  Discharge instructions reviewed with: Patient and Family.  Patient and/or family verbalized understanding of discharge instructions and all questions answered.  Copy of AVS reviewed with patient and/or family.  Patient will return 3/15 for next appointment.  Patient discharged in stable condition accompanied by: self and .    Pily Najera RN

## 2017-03-10 NOTE — TELEPHONE ENCOUNTER
Palliative Care Clinical Social Work Phone Contact    Data: Per discussion with pt in palliative care clinic earlier this week, she expressed that she would be receptive to potentially meeting with hospice team in a neutral setting rather than at her home, where she has some anxiety about lack of organization/cleanliness.    Intervention: As agreed, I have exchanged several messages with [facility] nurse intake staff. They explained to me today that they need to see the patient's home as part of their assessment, but also explained they may be able to involve a volunteer up to 4 hours per week to help with housekeeping.    Response/Assessment: Patient not yet contacted. She prefers to discuss in person rather than by phone, and I plan to reach out to her during infusion next week.    Plan: Meet with patient in infusion next week as able, share information about hospice team intake requirements, continue to provide counseling and motivational interviewing and support her in her hopes and goals.     PARAM Jiang, [facility]  Clinical , Palliative Care Program  [facility]  Office Phone: [phone]  [facility]: [phone]  [facility]: [phone]

## 2017-03-15 NOTE — PROGRESS NOTES
Care Coordinator Note  Patient had been napping when I called.  Reviewed that I had gotten a message she was not feeling up to coming in for chemotherapy today.  States she does have swollen legs, pain when walking, weepy rash on one of her feet, and nonproductive cough.  Patient states she has had the cough a long time and it has not changed.  Patient wanted to have clinic appointment prior to her chemotherapy appointment next week.  Appointment scheduled.  Reinforced that patient call to be seen in clinic this week if she needs or to go to the ER if emergent need.        Pt verbalized back understanding of the above information discussed.     Ciera Jack MSN, RN  Care Coordinator  Gynecologic Cancer   Office:  [phone]  Pager: [phone] #[phone]

## 2017-03-20 NOTE — TELEPHONE ENCOUNTER
Patient and  called in to report: hoarseness (patient barely able to speak) x 1 week, runny nose, productive cough and SOB x 1 week. Gray colored sputum. T 99.1. Patient also reporting R sided pressure/heaviness to her chest. Patient ran out of at home oxygen. Says she was wearing it continuously. Advised to go to ED this morning. Patient also complains of foot pain and swelling (suspicion of gout, uric acid previously elevated). Advised  that if he cannot transport her via his private vehicle they need to call an ambulance.  said he thinks he can get her into the car. He will take her to closest ED this morning.

## 2017-03-21 NOTE — TELEPHONE ENCOUNTER
Palliative Care Clinical Social Work Phone Contact    Data: Received message yesterday from  telling me they needed to talk with me.    Intervention: Called and spoke with Luc today. He was able to hear me and respond appropriately on the phone, although I am aware he is hard of hearing. He reported that he called 911 yesterday since Etelvina was having trouble breathing and her legs were very swollen and she could not walk. He was with her at  till 2am last night and is planning to return now. She is in a room there getting evaluated and treated.    I assessed needs and also explained that I have spoken with [facility] and although they cannot do out of the house evaluations, they do offer housekeeping help for patients enrolled. I provided brief supportive counseling as Luc is very concerned about Etelvina right now.    Response/Assessment: Luc voiced appreciation for the call back and support. We discussed that he and Etelvina are welcome to call my office phone any time as needed. I remain available as part of their palliative care supportive team.    Plan: Remain available as needed. Follow up in infusion as possible.    PARAM Jiang, [facility]  Clinical , Palliative Care Program  [facility]  Office Phone: [phone]  [facility]: [phone]  [facility]: [phone]

## 2017-03-23 NOTE — MR AVS SNAPSHOT
Etelvina Jacobs   3/23/2017   Anticoagulation Therapy Visit    Description:  66 year old female   Provider:  Keiko Shoemaker, RN   Department:  [facility]           INR as of 3/23/2017     Today's INR No new INR was available at the time of this encounter.      Anticoagulation Summary as of 3/23/2017     INR goal 2.0-3.0   Today's INR No new INR was available at the time of this encounter.   Full instructions 1.25 mg on Tue; 2.5 mg all other days   Next INR check 4/6/2017    Indications   Long-term (current) use of anticoagulants [Z79.01] [Z79.01]  Deep vein thrombosis (DVT) (H) [I82.409] [I82.409]         March 2017 Details    Sun Mon Tue Wed Thu Fri Sat        1               2               3               4                 5               6               7               8               9               10               11                 12               13               14               15               16               17               18                 19               20               21               22               23      2.5 mg   See details      24      2.5 mg         25      2.5 mg           26      2.5 mg         27      2.5 mg         28      1.25 mg         29      2.5 mg         30      2.5 mg         31      2.5 mg           Date Details   03/23 This INR check               How to take your warfarin dose     To take:  1.25 mg Take 0.5 of a 2.5 mg tablet.    To take:  2.5 mg Take 1 of the 2.5 mg tablets.           April 2017 Details    Sun Mon Tue Wed Thu Fri Sat           1      2.5 mg           2      2.5 mg         3      2.5 mg         4      1.25 mg         5      2.5 mg         6            7               8                 9               10               11               12               13               14               15                 16               17               18               19               20               21               22                 23                24               25               26               27               28               29                 30                      Date Details   No additional details    Date of next INR:  4/6/2017         How to take your warfarin dose     To take:  1.25 mg Take 0.5 of a 2.5 mg tablet.    To take:  2.5 mg Take 1 of the 2.5 mg tablets.

## 2017-03-24 NOTE — PROGRESS NOTES
Care Coordinator Note  Call from Dr. Noy Newton at [facility] where patient currently hospitalized in declining status.  Dr. Newton requesting to speak with provider regarding hospice plan for patient.  Valentina Haney NP notified and will contact Dr. Newton.        Ciera Jack MSN, RN  Care Coordinator  Gynecologic Cancer   Office:  [phone]  Pager: [phone] #[phone]

## 2017-03-27 NOTE — PROGRESS NOTES
I spoke with patient today, and she is expecting to be discharged from the hospital either today or tomorrow.

## 2017-03-27 NOTE — MR AVS SNAPSHOT
Etelvina Jacobs   3/27/2017   Anticoagulation Therapy Visit    Description:  66 year old female   Provider:  Clara Hendrickson, RN   Department:  [facility]           INR as of 3/27/2017     Today's INR       Anticoagulation Summary as of 3/27/2017     INR goal 2.0-3.0   Today's INR    Next INR check     Indications   Long-term (current) use of anticoagulants [Z79.01] [Z79.01]  Deep vein thrombosis (DVT) (H) [I82.409] [I82.409]         March 2017 Details    Sun Mon Tue Wed Thu Fri Sat        1               2               3               4                 5               6               7               8               9               10               11                 12               13               14               15               16               17               18                 19               20               21               22               23      2.5 mg   See details      24      2.5 mg         25      2.5 mg           26      2.5 mg         27      2.5 mg         28      1.25 mg         29      2.5 mg         30      2.5 mg         31      2.5 mg           Date Details   03/23 This INR check               How to take your warfarin dose     To take:  1.25 mg Take 0.5 of a 2.5 mg tablet.    To take:  2.5 mg Take 1 of the 2.5 mg tablets.           April 2017 Details    Sun Mon Tue Wed Thu Fri Sat           1      2.5 mg           2      2.5 mg         3      2.5 mg         4      1.25 mg         5      2.5 mg         6            7               8                 9               10               11               12               13               14               15                 16               17               18               19               20               21               22                 23               24               25               26               27               28               29                 30                      Date Details   No additional details    Date of  next INR:  4/6/2017         How to take your warfarin dose     To take:  1.25 mg Take 0.5 of a 2.5 mg tablet.    To take:  2.5 mg Take 1 of the 2.5 mg tablets.

## 2017-03-29 NOTE — PROGRESS NOTES
After multiple attempts, writer confirms pt is on hospice services with [facility].   Gladys to return to the call to confirm all coumadin/INR dosing to be done by [facility], if in fact pt is still on coumadin.    ADDENDUM:  Later in the day Gladys calls back & reports pt is in the [facility] hospice in [place].  She was hospitalized @ [facility] with pneumonia, cellulitis and ovarian ca with pulmonary mets.  She will be at the facility for ~ 20 days @ a reduced rate.  Her disposition after 20 days remains to be seen.  The INR goal remains 2-3.  Coumadin is not a med covered by hospice, but Gladys believes the family will purchase this outright & continue with it, with [facility] dosing & following the INRs.  An INR will be done tomorrow, & dosing history to be obtained at that time.

## 2017-03-29 NOTE — MR AVS SNAPSHOT
Etelvina Jacobs   3/29/2017   Anticoagulation Therapy Visit    Description:  66 year old female   Provider:  Cyn Bryant, RN   Department:  [facility]           INR as of 3/29/2017     Today's INR       Anticoagulation Summary as of 3/29/2017     INR goal 2.0-3.0   Today's INR    Next INR check     Indications   Long-term (current) use of anticoagulants [Z79.01] [Z79.01]  Deep vein thrombosis (DVT) (H) [I82.409] [I82.409]         March 2017 Details    Sun Mon Tue Wed Thu Fri Sat        1               2               3               4                 5               6               7               8               9               10               11                 12               13               14               15               16               17               18                 19               20               21               22               23      2.5 mg   See details      24      2.5 mg         25      2.5 mg           26      2.5 mg         27      2.5 mg         28      1.25 mg         29      2.5 mg         30      2.5 mg         31      2.5 mg           Date Details   03/23 This INR check               How to take your warfarin dose     To take:  1.25 mg Take 0.5 of a 2.5 mg tablet.    To take:  2.5 mg Take 1 of the 2.5 mg tablets.           April 2017 Details    Sun Mon Tue Wed Thu Fri Sat           1      2.5 mg           2      2.5 mg         3      2.5 mg         4      1.25 mg         5      2.5 mg         6            7               8                 9               10               11               12               13               14               15                 16               17               18               19               20               21               22                 23               24               25               26               27               28               29                 30                      Date Details   No additional details    Date of next  INR:  4/6/2017         How to take your warfarin dose     To take:  1.25 mg Take 0.5 of a 2.5 mg tablet.    To take:  2.5 mg Take 1 of the 2.5 mg tablets.

## 2017-03-30 NOTE — PROGRESS NOTES
ANTICOAGULATION FOLLOW-UP CLINIC VISIT    Patient Name:  Etelvina Jacobs  Date:  3/30/2017  Contact Type:  Telephone    SUBJECTIVE:     Patient Findings     Comments brielle Main was on Fluconazole while hospitalized but was not discharged on this.  Writer cannot change the calender.    3/23 warfarin held  3/24 warfarin held  3/25 INR 3.1  0.5mg  3/26  INR 2.8 1.25mg  3/27 INR 2.3  0mg  3/28-3/29  1.25mg           OBJECTIVE    INR   Date Value Ref Range Status   03/30/2017 1.9  Final       ASSESSMENT / PLAN  INR assessment SUB    Recheck INR In: 4 DAYS    INR Location Home INR      Anticoagulation Summary as of 3/30/2017     INR goal 2.0-3.0   Today's INR 3.1! (3/25/2017)   Maintenance plan No maintenance plan   Full instructions 3/30: 2.5 mg; 3/31: 1.25 mg; 4/1: 1.25 mg; 4/2: 1.25 mg   Plan last modified Leana Castaneda RN (3/30/2017)   Next INR check 4/3/2017   Priority INR   Target end date     Indications   Long-term (current) use of anticoagulants [Z79.01] [Z79.01]  Deep vein thrombosis (DVT) (H) [I82.409] [I82.409]         Anticoagulation Episode Summary     INR check location     Preferred lab     Send INR reminders to [facility]    Comments Has 5mg and 2.5mg tablets       Anticoagulation Care Providers     Provider Role Specialty Phone number    Alberta Deal MD [facility] Internal Medicine [phone]            See the Encounter Report to view Anticoagulation Flowsheet and Dosing Calendar (Go to Encounters tab in chart review, and find the Anticoagulation Therapy Visit)    Spoke with Gladys DAVIS.    Leana Castaneda, ODESSA

## 2017-03-30 NOTE — MR AVS SNAPSHOT
Etelvina Jacobs   3/30/2017   Anticoagulation Therapy Visit    Description:  66 year old female   Provider:  Leana Castaneda, RN   Department:  [facility]           INR as of 3/30/2017     Today's INR 3.1! (3/25/2017)      Anticoagulation Summary as of 3/30/2017     INR goal 2.0-3.0   Today's INR 3.1! (3/25/2017)   Full instructions 3/30: 2.5 mg; 3/31: 1.25 mg; 4/1: 1.25 mg; 4/2: 1.25 mg   Next INR check 4/3/2017    Indications   Long-term (current) use of anticoagulants [Z79.01] [Z79.01]  Deep vein thrombosis (DVT) (H) [I82.409] [I82.409]         March 2017 Details    Sun Mon Tue Wed Thu Fri Sat        1               2               3               4                 5               6               7               8               9               10               11                 12               13               14               15               16               17               18                 19               20               21               22               23               24               25                 26               27               28               29               30      2.5 mg   See details      31      1.25 mg           Date Details   03/30 This INR check               How to take your warfarin dose     To take:  1.25 mg Take 0.5 of a 2.5 mg tablet.    To take:  2.5 mg Take 0.5 of a 5 mg tablet.           April 2017 Details    Sun Mon Tue Wed Thu Fri Sat           1      1.25 mg           2      1.25 mg         3            4               5               6               7               8                 9               10               11               12               13               14               15                 16               17               18               19               20               21               22                 23               24               25               26               27               28               29                 30                       Date Details   No additional details    Date of next INR:  4/3/2017         How to take your warfarin dose     To take:  1.25 mg Take 0.5 of a 2.5 mg tablet.

## 2017-03-31 NOTE — TELEPHONE ENCOUNTER
Palliative Care Clinical Social Work Phone Contact    Data: Patient is well known to me from multiple clinic visits. Her  reached out to me last week re: increasing shortness of breath leading to hospitalization for the patient.     Intervention: I reached out and left a message offering support as needed.    Response/Assessment: Unable to assess since left voicemail only.    Plan: Wait for call back as needed.    PARAM Jiang, [facility]  Clinical , Palliative Care Program  [facility]  Office Phone: [phone]  [facility]: [phone]  [facility]: [phone]

## 2017-04-03 NOTE — MR AVS SNAPSHOT
Etelvina Jacobs   4/3/2017   Anticoagulation Therapy Visit    Description:  66 year old female   Provider:  Clara Hendrickson RN   Department:  [facility]           INR as of 4/3/2017     Today's INR 1.9! (3/30/2017)      Anticoagulation Summary as of 4/3/2017     INR goal 2.0-3.0   Today's INR 1.9! (3/30/2017)   Full instructions 4/3: 1.25 mg; 4/4: 2.5 mg; 4/5: 1.25 mg; 4/6: 2.5 mg; 4/7: 1.25 mg; 4/8: 1.25 mg; 4/9: 2.5 mg   Next INR check 4/10/2017    Indications   Long-term (current) use of anticoagulants [Z79.01] [Z79.01]  Deep vein thrombosis (DVT) (H) [I82.409] [I82.409]         April 2017 Details    Sun Mon Tue Wed Thu Fri Sat           1                 2               3      1.25 mg   See details      4      2.5 mg         5      1.25 mg         6      2.5 mg         7      1.25 mg         8      1.25 mg           9      2.5 mg         10            11               12               13               14               15                 16               17               18               19               20               21               22                 23               24               25               26               27               28               29                 30                      Date Details   04/03 This INR check       Date of next INR:  4/10/2017         How to take your warfarin dose     To take:  1.25 mg Take 0.5 of a 2.5 mg tablet.    To take:  2.5 mg Take 0.5 of a 5 mg tablet.

## 2017-04-03 NOTE — PROGRESS NOTES
ANTICOAGULATION FOLLOW-UP CLINIC VISIT    Patient Name:  Etelvina Jacobs  Date:  4/3/2017  Contact Type:  Telephone    SUBJECTIVE:     Patient Findings     Positives Change in diet/appetite (diet has stabilized .)           OBJECTIVE    INR   Date Value Ref Range Status   03/30/2017 1.9  Final       ASSESSMENT / PLAN  INR assessment THER    Recheck INR In: 1 WEEK    INR Location [facility]R      Anticoagulation Summary as of 4/3/2017     INR goal 2.0-3.0   Today's INR 1.9! (3/30/2017)   Maintenance plan No maintenance plan   Full instructions 4/3: 1.25 mg; 4/4: 2.5 mg; 4/5: 1.25 mg; 4/6: 2.5 mg; 4/7: 1.25 mg; 4/8: 1.25 mg; 4/9: 2.5 mg   Plan last modified Leana Castaneda RN (3/30/2017)   Next INR check 4/10/2017   Priority INR   Target end date     Indications   Long-term (current) use of anticoagulants [Z79.01] [Z79.01]  Deep vein thrombosis (DVT) (H) [I82.409] [I82.409]         Anticoagulation Episode Summary     INR check location     Preferred lab     Send INR reminders to [facility]    Comments Has 5mg and 2.5mg tablets       Anticoagulation Care Providers     Provider Role Specialty Phone number    Alberta Deal MD [facility] Internal Medicine [phone]            See the Encounter Report to view Anticoagulation Flowsheet and Dosing Calendar (Go to Encounters tab in chart review, and find the Anticoagulation Therapy Visit)    Spoke with [facility] Nurse.    Clara Hendrickson RN

## 2017-04-20 ENCOUNTER — ANTICOAGULATION THERAPY VISIT (OUTPATIENT)
Dept: ANTICOAGULATION | Facility: CLINIC | Age: 67
End: 2017-04-20

## 2017-04-20 NOTE — MR AVS SNAPSHOT
Etelvina Jacobs   4/20/2017   Anticoagulation Therapy Visit    Description:  66 year old female   Provider:  Keiko Shoemaker, RN   Department:  [facility]

## 2022-01-01 NOTE — PROGRESS NOTES
ANTICOAGULATION FOLLOW-UP CLINIC VISIT    Patient Name:  Etelvina Jacobs  Date:  2/1/2017  Contact Type:  Telephone    SUBJECTIVE:     Patient Findings     Comments Requested that Etelvina call back to verify dosing.           OBJECTIVE    INR   Date Value Ref Range Status   02/01/2017 1.90* 0.86 - 1.14 Final       ASSESSMENT / PLAN  INR assessment THER    Recheck INR In: 2 WEEKS    INR Location Clinic      Anticoagulation Summary as of 2/1/2017     INR goal 2.0-3.0   Selected INR 1.90! (2/1/2017)   Maintenance plan 1.25 mg (2.5 mg x 0.5) on Tue; 2.5 mg (2.5 mg x 1) all other days   Full instructions 1.25 mg on Tue; 2.5 mg all other days   Weekly total 16.25 mg   Plan last modified Leana Castaneda, RN (2/1/2017)   Next INR check 2/15/2017   Priority INR   Target end date     Indications   Long-term (current) use of anticoagulants [Z79.01] [Z79.01]  Deep vein thrombosis (DVT) (H) [I82.409] [I82.409]         Anticoagulation Episode Summary     INR check location     Preferred lab     Send INR reminders to [facility]    Comments Has 5mg and 2.5mg tablets       Anticoagulation Care Providers     Provider Role Specialty Phone number    Alberta Deal MD [facility] Internal Medicine [phone]            See the Encounter Report to view Anticoagulation Flowsheet and Dosing Calendar (Go to Encounters tab in chart review, and find the Anticoagulation Therapy Visit)    Message left for Etelvina on home #.    Leana Castaneda RN                  Statement Selected

## 2024-11-13 NOTE — Clinical Note
1/10/2017       RE: Etelvina Jacobs  [address]  [place] [address]     Dear Colleague,    Thank you for referring your patient, Etelvina Jacobs, to the [facility] at [facility]. Please see a copy of my visit note below.    Palliative Care Outpatient Clinic Progress Note    Patient Name:  Etelvina Jacobs  Primary Provider:  Clinic, [facility]    Chief Complaint: recurrent metastatic ovarian cancer with lung and brain mets; dyspnea, cough, anxiety    Interim History:  Etelvina Jaocbs 66 year old female returns to be seen by palliative care today.  Until last week, cough wasn't as bothersome as it is now. Starting last week, tickle in throat and then uncontrolled coughing and at times gets dizzy from coughing so much which is exhausting her as well. When in coughing spasms, feels like going to vomit and then just worn out afterwards. Using her oxygen all the time now as feeling short of breath when talking.      taking 2 tablets of hydromorphone (4mg) 2-3 times a day and usually the cough resolves within 5-10 minutes. Doesn't want to take it more than 3 times in a day and sometimes trys to not take it at all.      Little constipation but not bad and taking senna when needed    Appetite - not good - hardly have any appetite. Down to 175lbs. Trying to avoid sweets. Wanted to lose weight but not this way.     Not really sleeping at night. Tossing and turning at night but hs been that way for years but the coughing is not helping. Used to wake up not being able to catch breath but that isn't as bad now.     Reading and coloring and watching tv. Can concentrate enough to color. Usually falls asleep with reading.     Zoloft just increased as hoping it will help to decrease anxiety.      Review of Systems:  ROS: 10 point ROS neg other than the symptoms noted above in the HPI and here  No rashes    Social History:    Advance Directive Status:   To make you aware the patient was told to call after her procedure she has her after visit summary the number provided to call was [phone]     That number rings and hangs up on her unable to leave a message.     I provided her the correct number [phone]    DNR/DNI        Allergies   Allergen Reactions     Carboplatin Difficulty breathing     Reaction to Carboplatin on 8th dose.      Current Outpatient Prescriptions   Medication Sig Dispense Refill     sertraline (ZOLOFT) 50 MG tablet Take 1 tablet (50 mg) by mouth daily 30 tablet 3     sodium chloride (OCEAN) 0.65 % nasal spray Spray 1 spray into both nostrils every 2 hours as needed for congestion 88 mL 3     umeclidinium-vilanterol (ANORO ELLIPTA) 62.5-25 MCG/INH oral inhaler Inhale 1 puff into the lungs daily as needed Alternate with duoneb q6h  Therapeutic interchange for Combivent Inhaler. 1 Inhaler 11     albuterol (ALBUTEROL) 108 (90 BASE) MCG/ACT Inhaler Inhale 2 puffs into the lungs every 6 hours 1 Inhaler 11     HYDROmorphone (DILAUDID) 2 MG tablet Take 1-2 tablets (2-4 mg) by mouth every 3 hours as needed (dyspnea and  / or cough) 200 tablet 0     LORazepam (ATIVAN) 0.5 MG tablet Take 1 tablet (0.5 mg) by mouth every 6 hours as needed (Anxiety, Nausea/Vomiting or Sleep) 30 tablet 1     prochlorperazine (COMPAZINE) 5 MG tablet Take 1 tablet (5 mg) by mouth every 6 hours as needed (Nausea/Vomiting) 30 tablet 2     senna (SENOKOT) 8.6 MG tablet Take 1-2 tablets by mouth 2 times daily as needed for constipation 120 tablet 3     order for DME Equipment being ordered: Oxygen 3 liters of oxygen continuous to keep sats >90%. RA sats 88%. 1 Device 0     warfarin (COUMADIN) 5 MG tablet Take 2.5 mg on Tues, Thurs, Sun and 5 mg Mon, Wed, Fri, Sat or as directed by anticoagulation clinic. 72 tablet 0     DiphenhydrAMINE HCl (BENADRYL PO) Take 50 mg by mouth daily as needed       ondansetron (ZOFRAN) 8 MG tablet Take 1 tablet (8 mg) by mouth every 8 hours as needed for nausea 30 tablet 2     omeprazole (PRILOSEC) 20 MG capsule Take 2 capsules (40 mg) by mouth daily 90 capsule 6     order for DME Equipment being ordered: Oxygen 1-2 L NC Keep O2 sats > 90%. RA sats 88% 1 Device 0     LORazepam (ATIVAN) 1 MG tablet Take 1  tablet (1 mg) by mouth every 6 hours as needed (Anxiety, Nausea/Vomiting or Sleep) 30 tablet 1     prochlorperazine (COMPAZINE) 10 MG tablet Take 1 tablet (10 mg) by mouth every 6 hours as needed (nausea/vomiting) 30 tablet 2     ipratropium - albuterol 0.5 mg/2.5 mg/3 mL (DUONEB) 0.5-2.5 (3) MG/3ML nebulization Take 1 vial (3 mLs) by nebulization once for 1 dose In clinic neb 3 mL 0     Past Medical History   Diagnosis Date     VAIN III (vaginal intraepithelial neoplasia grade III) 3/24/11     Ovarian cancer (H) 12/09     Stage IC grade 3 ovarian cancer     Hypertension      Hyperlipidemia      Pulmonary nodules      Shortness of breath      Renal disease      insuffiency     Gastro-oesophageal reflux disease      Anxiety      Depression      Past Surgical History   Procedure Laterality Date     Vulva surgery  3/24/11     VAIN III     Hysterectomy radical  12/09     Stage IC grade 3 ovarian cancer,     Thoracoscopic wedge resection lung  11/16/2011     Procedure:THORACOSCOPIC WEDGE RESECTION LUNG; Left Thoracoscopic  Lower Lobe Wedge Resection with Pleura; Surgeon:TAMARA SERVIN; Location:[facility] OR     Biopsy vaginal  1/31/2013     Procedure: BIOPSY VAGINAL;  Colposcopy, CO2 Laser to Vagina, Upper Vaginal biopsies;  Surgeon: Marietta Sexton MD;  Location: [facility] OR     Laser co2 vagina  1/31/2013     Procedure: LASER CO2 VAGINA;;  Surgeon: Marietta Sexton MD;  Location: [facility] OR           /76 mmHg  Pulse 116  Temp(Src) 99  F (37.2  C) (Oral)  Resp 16  Wt 79.606 kg (175 lb 8 oz)  SpO2 95%  General: ill appearing, well groomed, sitting in chair, in NAD  Eyes: EOMI, sclera clear  HEENT: NC/AT; mucous membranes moist  Lungs: mild increased work of breathing when talking, CTA b/l without wheezing  Heart: RRR  Abdomen: soft, non tender to palpation, no round or guarding  Neuro: A&O x 3; CN II-XII grossly intact;   Neuropsych: alert, affect slightly flat, good eye contact, engaged, sensorium intact.       Key Data  Reviewed:  GFR 39 - fluctuates between 30-39      Impression:     Etelvina is a 66 yr old female with recurrent metastatic ovarian cancer: She is getting palliative chemotherapy with taxol and avastin with hope that it can help her feel better and give her more time. She has sig dyspnea with talking and her cough is worse past week.    Recommendations & Counseling:    Cough and shortness of breath:   - start Oxycontin 10mg twice a day, everyday even if you are no coughing. Take one pill around 8am and then other around 8pm.   - continue hydromorphone 1-2 tablets (2-4mg) every 3 hours if needed for cough and shortness of breath.   - we will call her on Friday and if hasn't been able to start oxycontin and cough still very bothersome, would add decadron 4mg every morning x 7 days then stop.   Anxiety:   - continue with increased dose of zoloft 50mg qd. May increase at next visit.   Constipation prevention: need to have a bowel movement every day.   - Take senna as needed or you can schedule it and take 1-2 tablets twice a day. May increase up to 4 tablets twice a day if needed for a daily bowel movement and may need to decrease if you are having diarrhea or loose bowel movements.     Follow up in 4-6 weeks.     Thank you for involving us in the patient's care. 30 minutes face time over half spent counseling.  Darlene Peck MD / Palliative Medicine / Pager [phone] / After-Hours Answering Service [phone] / Main Palliative Clinic - [phone] / [facility] Inpatient Team Consult Pager [phone] (answered 8am-430pm M-F)    Answers for HPI/ROS submitted by the patient on 12/27/2016   General Symptoms: Yes  Skin Symptoms: No  HENT Symptoms: Yes  EYE SYMPTOMS: No  HEART SYMPTOMS: Yes  LUNG SYMPTOMS: Yes  INTESTINAL SYMPTOMS: No  URINARY SYMPTOMS: Yes  GYNECOLOGIC SYMPTOMS: No  BREAST SYMPTOMS: No  SKELETAL SYMPTOMS: Yes  BLOOD SYMPTOMS: No  NERVOUS SYSTEM SYMPTOMS: Yes  MENTAL HEALTH SYMPTOMS: Yes  Fever: No  Loss  of appetite: No  Weight loss: No  Weight gain: No  Fatigue: Yes  Night sweats: No  Chills: No  Increased stress: Yes  Excessive hunger: No  Excessive thirst: No  Feeling hot or cold when others believe the temperature is normal: No  Loss of height: No  Post-operative complications: No  Surgical site pain: No  Hallucinations: No  Change in or Loss of Energy: Yes  Hyperactivity: No  Confusion: Yes  Ear pain: No  Ear discharge: No  Hearing loss: No  Tinnitus: Yes  Nosebleeds: Yes  Congestion: Yes  Sinus pain: No  Trouble swallowing: No   Voice hoarseness: No  Mouth sores: Yes  Sore throat: No  Tooth pain: No  Gum tenderness: No  Bleeding gums: No  Change in taste: No  Change in sense of smell: No  Dry mouth: No  Hearing aid used: No  Neck lump: No  Cough: Yes  Sputum or phlegm: No  Coughing up blood: No  Difficulty breating or shortness of breath: Yes  Snoring: No  Wheezing: Yes  Difficulty breathing on exertion: Yes  Respiratory pain: No  Nighttime Cough: Yes  Difficulty breathing when lying flat: Yes  Chest pain or pressure: No  Fast or irregular heartbeat: Yes  Pain in legs with walking: Yes  Swelling in feet or ankles: Yes  Trouble breathing while lying down: Yes  Fingers or Toes appear blue: No  High blood pressure: No  Low blood pressure: No  Fainting: No  Murmurs: No  Chest pain on exertion: No  Chest pain at rest: No  Cramping pain in leg during exercise: Yes  Pacemaker: No  Varicose veins: No  Edema or swelling: No  Fast heart beat: Yes  Wake up at night with shortness of breath: Yes  Heart flutters: No  Light-headedness: Yes  Exercise intolerance: Yes  Trouble holding urine or incontinence: Yes  Pain or burning: No  Trouble starting or stopping: No  Increased frequency of urination: No  Blood in urine: No  Decreased frequency of urination: No  Frequent nighttime urination: Yes  Flank pain: No  Difficulty emptying bladder: No  Back pain: No  Muscle aches: Yes  Neck pain: No  Swollen joints: Yes  Joint pain:  Yes  Bone pain: No  Muscle cramps: No  Muscle weakness: No  Joint stiffness: Yes  Bone fracture: No  Trouble with coordination: No  Dizziness or trouble with balance: Yes  Fainting or black-out spells: No  Memory loss: No  Headache: No  Seizures: No  Speech problems: No  Tingling: Yes  Tremor: No  Weakness: Yes  Difficulty walking: Yes  Paralysis: No  Numbness: Yes  Nervous or Anxious: Yes  Depression: Yes  Trouble sleeping: Yes  Trouble thinking or concentrating: Yes  Mood changes: Yes  Panic attacks: Yes          Again, thank you for allowing me to participate in the care of your patient.      Sincerely,    Darlene Peck MD